# Patient Record
Sex: FEMALE | Race: WHITE | NOT HISPANIC OR LATINO | Employment: OTHER | URBAN - METROPOLITAN AREA
[De-identification: names, ages, dates, MRNs, and addresses within clinical notes are randomized per-mention and may not be internally consistent; named-entity substitution may affect disease eponyms.]

---

## 2017-01-19 PROBLEM — M47.816 LUMBAR SPONDYLOSIS: Status: ACTIVE | Noted: 2017-01-19

## 2017-01-19 PROBLEM — M51.36 DDD (DEGENERATIVE DISC DISEASE), LUMBAR: Status: ACTIVE | Noted: 2017-01-19

## 2017-09-15 LAB
ALBUMIN SERPL-MCNC: 3.5 G/DL (ref 3.1–4.7)
ALP SERPL-CCNC: 42 IU/L (ref 40–104)
ALT (SGPT): 17 IU/L (ref 3–33)
AST SERPL-CCNC: 21 IU/L (ref 10–40)
BILIRUB SERPL-MCNC: 0.4 MG/DL (ref 0.3–1)
BUN SERPL-MCNC: 7 MG/DL (ref 8–20)
CALCIUM SERPL-MCNC: 8.2 MG/DL (ref 7.7–10.4)
CHLORIDE: 113 MMOL/L (ref 98–110)
CO2 SERPL-SCNC: 24.4 MMOL/L (ref 22.8–31.6)
CREATININE: 0.59 MG/DL (ref 0.6–1.4)
GLUCOSE: 142 MG/DL (ref 70–99)
HCT VFR BLD AUTO: 31 % (ref 36–48)
HGB BLD-MCNC: 10.7 G/DL (ref 12–15)
MAGNESIUM SERPL-MCNC: 1.8 MG/DL (ref 1.5–2.6)
MCH RBC QN AUTO: 32.1 PG (ref 25–35)
MCHC RBC AUTO-ENTMCNC: 34.5 G/DL (ref 31–36)
MCV RBC AUTO: 93.1 FL (ref 79–98)
NUCLEATED RBCS: 0 %
PLATELET # BLD AUTO: 175 K/UL (ref 140–440)
POTASSIUM SERPL-SCNC: 2.9 MMOL/L (ref 3.5–5)
PROT SERPL-MCNC: 5.7 G/DL (ref 6–8.2)
RBC # BLD AUTO: 3.33 M/UL (ref 3.5–5.5)
SODIUM: 143 MMOL/L (ref 134–144)
WBC # BLD AUTO: 9 K/UL (ref 5–10)

## 2017-12-14 VITALS
WEIGHT: 135 LBS | SYSTOLIC BLOOD PRESSURE: 100 MMHG | BODY MASS INDEX: 21.69 KG/M2 | HEIGHT: 66 IN | DIASTOLIC BLOOD PRESSURE: 70 MMHG

## 2017-12-14 RX ORDER — MIRTAZAPINE 45 MG/1
45 TABLET, FILM COATED ORAL NIGHTLY
Status: ON HOLD | COMMUNITY
Start: 2017-12-08 | End: 2021-02-22 | Stop reason: HOSPADM

## 2017-12-14 RX ORDER — HYDROCODONE BITARTRATE AND ACETAMINOPHEN 10; 325 MG/1; MG/1
TABLET ORAL
COMMUNITY
Start: 2017-11-16 | End: 2018-06-06

## 2017-12-14 RX ORDER — ACETAMINOPHEN AND CODEINE PHOSPHATE 300; 30 MG/1; MG/1
TABLET ORAL
COMMUNITY
Start: 2017-10-18 | End: 2017-12-14 | Stop reason: SDUPTHER

## 2017-12-14 RX ORDER — VILAZODONE HYDROCHLORIDE 10 MG-20MG
KIT ORAL
COMMUNITY
Start: 2017-10-26 | End: 2018-06-06

## 2017-12-14 RX ORDER — CHLORPROMAZINE HYDROCHLORIDE 200 MG/1
TABLET, FILM COATED ORAL
COMMUNITY
Start: 2017-12-07 | End: 2018-10-01

## 2017-12-14 RX ORDER — ERGOCALCIFEROL 1.25 MG/1
CAPSULE ORAL
Refills: 0 | COMMUNITY
Start: 2017-10-25 | End: 2018-06-06

## 2017-12-14 RX ORDER — APIXABAN 5 MG/1
TABLET, FILM COATED ORAL
COMMUNITY
Start: 2017-12-12 | End: 2018-10-01

## 2017-12-14 RX ORDER — HYDROXYZINE PAMOATE 50 MG/1
CAPSULE ORAL
COMMUNITY
Start: 2017-12-08 | End: 2017-12-14 | Stop reason: SDUPTHER

## 2017-12-14 RX ORDER — TRAMADOL HYDROCHLORIDE 50 MG/1
TABLET ORAL
COMMUNITY
Start: 2017-12-08 | End: 2018-06-06

## 2017-12-14 RX ORDER — LORATADINE 10 MG/1
10 TABLET ORAL DAILY
Refills: 0 | COMMUNITY
Start: 2017-09-25 | End: 2018-06-06

## 2018-01-15 ENCOUNTER — OFFICE VISIT (OUTPATIENT)
Dept: SURGERY | Facility: CLINIC | Age: 43
End: 2018-01-15
Payer: MEDICARE

## 2018-01-15 VITALS
WEIGHT: 134.94 LBS | BODY MASS INDEX: 21.69 KG/M2 | DIASTOLIC BLOOD PRESSURE: 70 MMHG | HEIGHT: 66 IN | SYSTOLIC BLOOD PRESSURE: 100 MMHG

## 2018-01-15 DIAGNOSIS — R11.0 NAUSEA: ICD-10-CM

## 2018-01-15 DIAGNOSIS — N94.10 DYSPAREUNIA, FEMALE: ICD-10-CM

## 2018-01-15 DIAGNOSIS — R10.31 RIGHT LOWER QUADRANT ABDOMINAL PAIN: ICD-10-CM

## 2018-01-15 DIAGNOSIS — E27.8 RIGHT ADRENAL MASS: Primary | ICD-10-CM

## 2018-01-15 PROCEDURE — 99204 OFFICE O/P NEW MOD 45 MIN: CPT | Mod: ,,, | Performed by: SURGERY

## 2018-01-15 RX ORDER — LURASIDONE HYDROCHLORIDE 60 MG/1
80 TABLET, FILM COATED ORAL NIGHTLY
COMMUNITY
Start: 2018-01-04 | End: 2018-11-15

## 2018-01-15 NOTE — LETTER
January 15, 2018      Alberto Fairchild MD  [address]  [address]  [facility] [address]           [facility]  [address]  [address]  [facility] [address]  Phone: [phone]  Fax: [phone]          Patient: Rosalina Meyer   MR Number: [id number]   YOB: 1975   Date of Visit: 1/15/2018       Dear Dr. Alberto Fairchild:    Thank you for referring Rosalina Meyer to me for evaluation. Attached you will find relevant portions of my assessment and plan of care.    If you have questions, please do not hesitate to call me. I look forward to following Rosalina Meyer along with you.    Sincerely,    Preston ROCHE MD    Enclosure  CC:  No Recipients    If you would like to receive this communication electronically, please contact [email or web address] or [phone] to request more information on [employer] access.    For providers and/or their staff who would like to refer a patient to [facility], please contact us through our one-stop-shop provider referral line, [facility], at [phone].    If you feel you have received this communication in error or would no longer like to receive these types of communications, please e-mail [email or web address]

## 2018-01-15 NOTE — PROGRESS NOTES
Subjective:       Patient ID: Rosalina Meyer is a 42 y.o. female.    Chief Complaint: Other (Referred by Dr Mata to Evaluate Abdominal Adhesions)      HPI:  Patient presents with multiple complaints. Most of her complaints center around abdominal pain. The pain is in her right lower quadrant extending into her pelvis and right lower back. Patient also complains of intermittent nausea. Patient also complains of dyspareunia. Patient was in the emergency room in 2017 at which time a CT scan was done. 2.2 cm right adrenal mass was diagnosed. Patient has some hormonal studies done which showed mild elevation of dopamine but no other abnormalities. Patient has an appointment with an endocrinologist in next week or 2. Patient feels that most of her problems are secondary to abdominal adhesions.    Past Medical History:   Diagnosis Date    ADHD (attention deficit hyperactivity disorder)     Anemia     Anxiety     Bipolar 1 disorder     Bipolar disorder     CVA (cerebral vascular accident)     Endometriosis     Headache     Heartburn     Infertility, female     Insomnia     Migraine headache     PTSD (post-traumatic stress disorder)     Trauma      Past Surgical History:   Procedure Laterality Date    APPENDECTOMY      BLADDER SUSPENSION      CERVICAL FUSION       SECTION      CHOLECYSTECTOMY      HYSTERECTOMY      KNEE SURGERY      TIBIA FRACTURE SURGERY       Review of patient's allergies indicates:   Allergen Reactions    Seroquel [quetiapine] Other (See Comments)     Dystonic reaction    Neurontin [gabapentin]     Phenergan [promethazine]      Medication List with Changes/Refills   Current Medications    CARBAMAZEPINE (TEGRETOL) 100 MG CHEWABLE TABLET    Take by mouth 3 (three) times daily.    CHLORPROMAZINE (THORAZINE) 200 MG TABLET        CYCLOBENZAPRINE (FLEXERIL) 10 MG TABLET    Take 10 mg by mouth 3 (three) times daily as needed for Muscle spasms.     DEXTROAMPHETAMINE-AMPHETAMINE (ADDERALL) 15 MG TABLET    Take 15 mg by mouth 2 (two) times daily.    DIAZEPAM (VALIUM) 10 MG TAB        ELIQUIS 5 MG TAB        EPINEPHRINE (EPIPEN) 0.3 MG/0.3 ML ATIN    USE AS DIRECTED ONCE FOR ALLERGIC REACTION INJECTION FOR 30 DAYS    ESTRADIOL (ESTRACE) 0.01 % (0.1 MG/GRAM) VAGINAL CREAM    Place a pea-sized amount inside vagina every night for 2 weeks, and then 2-3 times a week.    HYDROCODONE-ACETAMINOPHEN 10-325MG (NORCO)  MG TAB        LAMOTRIGINE (LAMICTAL) 150 MG TAB        LATUDA 60 MG TAB TABLET        LORATADINE (CLARITIN) 10 MG TABLET    Take 10 mg by mouth once daily.    MELOXICAM (MOBIC) 15 MG TABLET    take 1 tablet by mouth once daily    MIRTAZAPINE (REMERON) 45 MG TABLET        NAPROXEN (NAPROSYN) 500 MG TABLET    take 1 tablet by mouth every 12 hours if needed for 30 DAYS    OMEPRAZOLE (PRILOSEC) 40 MG CAPSULE    Take 40 mg by mouth once daily.    PROCHLORPERAZINE (COMPAZINE) 10 MG TABLET    Take 10 mg by mouth 3 (three) times daily as needed.    RIZATRIPTAN (MAXALT-MLT) 10 MG DISINTEGRATING TABLET    Take 10 mg by mouth as needed. May repeat in 2 hours if needed     TRAMADOL (ULTRAM) 50 MG TABLET        VIIBRYD 10 MG (7)- 20 MG (23) DSPK        VITAMIN D2 50,000 UNIT CAPSULE    take 1 capsule by mouth every week for 90 DAYS     Family History   Problem Relation Age of Onset    Hypertension Father     Hypertension Mother     Stroke Mother      Social History     Social History    Marital status:      Spouse name: N/A    Number of children: N/A    Years of education: N/A     Social History Main Topics    Smoking status: Former Smoker     Packs/day: 1.00     Types: Cigarettes, Vaping w/o nicotine     Quit date: 12/27/2016    Smokeless tobacco: Former User    Alcohol use Yes    Drug use: Yes     Types: Marijuana    Sexual activity: Yes     Partners: Male     Birth control/ protection: See Surgical Hx     Other Topics Concern    None      Social History Narrative    None         Review of Systems   Constitutional: Negative for appetite change, chills, fever and unexpected weight change.   HENT: Negative for hearing loss, rhinorrhea, sore throat and voice change.    Eyes: Negative for photophobia and visual disturbance.   Respiratory: Negative for cough, choking and shortness of breath.    Cardiovascular: Negative for chest pain, palpitations and leg swelling.   Gastrointestinal: Positive for abdominal pain, diarrhea and nausea. Negative for blood in stool, constipation and vomiting.   Endocrine: Negative for cold intolerance, heat intolerance and polyphagia.   Genitourinary: Negative for dysuria.   Musculoskeletal: Negative for arthralgias and back pain.   Skin: Negative for color change.   Neurological: Negative for dizziness, seizures, syncope and headaches.   Hematological: Negative for adenopathy. Does not bruise/bleed easily.       Objective:      Physical Exam   Abdominal:   Abdomen is soft with no objective findings. There is diffuse subjective tenderness.       Assessment/Plan:   Right adrenal mass    Right lower quadrant abdominal pain    Nausea    Dyspareunia, female        Although patient has multiple complaints and conditions there are no surgical indications at this time.    #1 the adrenal mass due to its small size and lack of endocrine activity does not need surgical intervention. I agree with endocrine evaluation. Will defer recommendations for radiologic follow-up to the endocrinologist.    #2 evaluation of chronic abdominal pain and other above diagnoses are unfortunate outside the scope of my practice. I've advised the patient to return to her primary physician for referral to perhaps gastroenterology and or gynecology.    If any surgically correctable pathology is identified obese happy to see the patient again. Unfortunately I cannot help her in any other way at this time.

## 2018-01-31 ENCOUNTER — HOSPITAL ENCOUNTER (EMERGENCY)
Facility: HOSPITAL | Age: 43
Discharge: HOME | End: 2018-01-31
Attending: EMERGENCY MEDICINE
Payer: MEDICARE

## 2018-01-31 VITALS
BODY MASS INDEX: 21.69 KG/M2 | WEIGHT: 135 LBS | OXYGEN SATURATION: 99 % | DIASTOLIC BLOOD PRESSURE: 58 MMHG | HEART RATE: 99 BPM | RESPIRATION RATE: 18 BRPM | SYSTOLIC BLOOD PRESSURE: 103 MMHG | TEMPERATURE: 98 F | HEIGHT: 66 IN

## 2018-01-31 DIAGNOSIS — S93.515A SPRAIN OF INTERPHALANGEAL JOINT OF LEFT LESSER TOE(S), INITIAL ENCOUNTER: Primary | ICD-10-CM

## 2018-01-31 DIAGNOSIS — S90.212A CONTUSION OF LEFT GREAT TOE WITH DAMAGE TO NAIL, INITIAL ENCOUNTER: ICD-10-CM

## 2018-01-31 DIAGNOSIS — T14.90XA INJURY: ICD-10-CM

## 2018-01-31 PROCEDURE — 99283 EMERGENCY DEPT VISIT LOW MDM: CPT | Mod: 25

## 2018-01-31 PROCEDURE — 63600175 PHARM REV CODE 636 W HCPCS: Performed by: PHYSICIAN ASSISTANT

## 2018-01-31 PROCEDURE — 25000003 PHARM REV CODE 250: Performed by: PHYSICIAN ASSISTANT

## 2018-01-31 PROCEDURE — 96372 THER/PROPH/DIAG INJ SC/IM: CPT

## 2018-01-31 RX ORDER — KETOROLAC TROMETHAMINE 30 MG/ML
30 INJECTION, SOLUTION INTRAMUSCULAR; INTRAVENOUS
Status: COMPLETED | OUTPATIENT
Start: 2018-01-31 | End: 2018-01-31

## 2018-01-31 RX ORDER — MUPIROCIN 20 MG/G
1 OINTMENT TOPICAL
Status: COMPLETED | OUTPATIENT
Start: 2018-01-31 | End: 2018-01-31

## 2018-01-31 RX ADMIN — KETOROLAC TROMETHAMINE 30 MG: 30 INJECTION, SOLUTION INTRAMUSCULAR at 12:01

## 2018-01-31 RX ADMIN — MUPIROCIN 22 G: 20 OINTMENT TOPICAL at 12:01

## 2018-01-31 NOTE — ED PROVIDER NOTES
Encounter Date: 2018    SCRIBE #1 NOTE: I, Kalani Aguilera, am scribing for, and in the presence of,  DAVID Bell. I have scribed the entire note.       History     Chief Complaint   Patient presents with    Foot Pain     pt reports hit lt foot against wall yesterday co pain     2018 12:21 AM     Chief complaint: Left foot and second toe pain      Rosalina Meyer is a 42 y.o. female with a history of anxiety who presents to the ED with an onset of worsening left foot and second toe pain since yesterday after she hit her foot against the wall. She states that her second toe hurts the most, and the pain shoots up her foot. The patient has taken Ibuprofen with mild relief. There are no exacerbating factors for her symptoms. Her PSHx includes a knee surgery and tibia fracture surgery.       The history is provided by the patient.     Review of patient's allergies indicates:   Allergen Reactions    Seroquel [quetiapine] Other (See Comments)     Dystonic reaction    Haldol [haloperidol lactate]     Neurontin [gabapentin]     Phenergan [promethazine]      Past Medical History:   Diagnosis Date    ADHD (attention deficit hyperactivity disorder)     Anemia     Anxiety     Bipolar 1 disorder     Bipolar disorder     CVA (cerebral vascular accident)     Endometriosis     Headache     Heartburn     Infertility, female     Insomnia     Migraine headache     PTSD (post-traumatic stress disorder)     Trauma      Past Surgical History:   Procedure Laterality Date    APPENDECTOMY      BLADDER SUSPENSION      CERVICAL FUSION       SECTION      CHOLECYSTECTOMY      HYSTERECTOMY      KNEE SURGERY      TIBIA FRACTURE SURGERY       Family History   Problem Relation Age of Onset    Hypertension Father     Hypertension Mother     Stroke Mother      Social History   Substance Use Topics    Smoking status: Former Smoker     Packs/day: 1.00     Types: Cigarettes, Vaping w/o  nicotine     Quit date: 12/27/2016    Smokeless tobacco: Former User    Alcohol use Yes     Review of Systems   Constitutional: Negative for chills and fever.   HENT: Negative for facial swelling and trouble swallowing.    Eyes: Negative for discharge.   Respiratory: Negative for cough, chest tightness, shortness of breath and wheezing.    Cardiovascular: Negative for chest pain and palpitations.   Gastrointestinal: Negative for abdominal pain, diarrhea, nausea and vomiting.   Genitourinary: Negative for dysuria and hematuria.   Musculoskeletal: Negative for back pain, joint swelling, myalgias, neck pain and neck stiffness.        Positive for left foot and second toe pain.   Skin: Negative for color change, pallor, rash and wound.   Neurological: Negative for dizziness, syncope, weakness, light-headedness, numbness and headaches.   Hematological: Does not bruise/bleed easily.   Psychiatric/Behavioral: The patient is not nervous/anxious.        Physical Exam     Initial Vitals [01/31/18 1003]   BP Pulse Resp Temp SpO2   (!) 103/58 99 18 97.6 °F (36.4 °C) 99 %      MAP       73         Physical Exam    Nursing note and vitals reviewed.  Constitutional: She appears well-developed and well-nourished. She is not diaphoretic. No distress.   HENT:   Head: Normocephalic and atraumatic.   Cardiovascular: Intact distal pulses.   Musculoskeletal: Normal range of motion. She exhibits tenderness. She exhibits no edema.        Left foot: There is tenderness, bony tenderness and swelling. There is normal range of motion, normal capillary refill, no crepitus, no deformity and no laceration. Left 2nd toe: Exhibits tenderness (Small amount of dried blood and TTP with partial nail avulsion noted to left second toe.).        Feet:    Neurological: She is alert and oriented to person, place, and time. She has normal strength. No sensory deficit.   Skin: Skin is warm and dry. No rash and no abscess noted. No erythema.   Psychiatric:  She has a normal mood and affect.         ED Course   Procedures  Labs Reviewed - No data to display     Imaging Results          X-Ray Foot Complete Left (Final result)  Result time 01/31/18 11:28:36    Final result by Tonya Quinones MD (01/31/18 11:28:36)                 Impression:        No acute fracture or dislocation left foot      Electronically signed by: TONYA QUINONES MD  Date:     01/31/18  Time:    11:28              Narrative:    Left foot 3 views  Comparison study: 6/7/2016    FINDINGS    The K wire has been removed from the little toe.  There is no acute fracture or dislocation.  There is calcaneal spur at the site of insertion of the Achilles tendon.  There is mild degenerative change first metatarsophalangeal joint.                                 Medical Decision Making:   History:   Old Medical Records: I decided to obtain old medical records.  Differential Diagnosis:   Fracture  Dislocation  Sprain  Contusion  Strain  Spasm      Clinical Tests:   Radiological Study: Reviewed and Ordered       APC / Resident Notes:   X-rays of the left foot show no acute abnormalities, fractures or dislocations.  She does have a partial nail avulsion of left second toenail, but no nailbed laceration in need of repair at this time.  Nail is intact.  Toes are hermila taped and she is given a postop shoe with crutches.  No need for narcotics at this time.  She is given a dose of IM Toradol here in the emergency department and is discharged home to follow-up with podiatry for reevaluation and further treatment options.  She voices understanding and is agreeable to the plan.  She is given specific return precautions.       Scribe Attestation:   Scribe #1: I performed the above scribed service and the documentation accurately describes the services I performed. I attest to the accuracy of the note.    I, Xiao Melendez PA-C, personally performed the services described in this documentation. All medical record  entries made by the scribe were at my direction and in my presence.  I have reviewed the chart and agree that the record reflects my personal performance and is accurate and complete. Xiao Melendez PA-C.  6:24 PM 01/31/2018          ED Course      Clinical Impression:     1. Sprain of interphalangeal joint of left lesser toe(s), initial encounter    2. Injury    3. Contusion of left great toe with damage to nail, initial encounter          Disposition:   Disposition: Discharged  Condition: Stable                        Xiao Melendez PA-C  01/31/18 1826

## 2018-01-31 NOTE — ED NOTES
Given crutches with instruction on home use, hermila tape 2nd and third toe, post op shoe applied detailed teaching done given ice pack to use at home. Given written and verbal DC instructions questions answered per MD aware to follow up with PCP encouraged to return if needed.

## 2018-02-21 ENCOUNTER — TELEPHONE (OUTPATIENT)
Dept: UROLOGY | Facility: CLINIC | Age: 43
End: 2018-02-21

## 2018-02-21 ENCOUNTER — HOSPITAL ENCOUNTER (EMERGENCY)
Facility: HOSPITAL | Age: 43
Discharge: LEFT BEFORE DISCHARGE | End: 2018-02-21
Attending: EMERGENCY MEDICINE
Payer: MEDICARE

## 2018-02-21 VITALS
HEART RATE: 104 BPM | TEMPERATURE: 99 F | HEIGHT: 66 IN | WEIGHT: 135 LBS | BODY MASS INDEX: 21.69 KG/M2 | RESPIRATION RATE: 16 BRPM | DIASTOLIC BLOOD PRESSURE: 80 MMHG | OXYGEN SATURATION: 96 % | SYSTOLIC BLOOD PRESSURE: 107 MMHG

## 2018-02-21 DIAGNOSIS — R10.9 FLANK PAIN: Primary | ICD-10-CM

## 2018-02-21 LAB
ALBUMIN SERPL BCP-MCNC: 3.7 G/DL
ALP SERPL-CCNC: 69 U/L
ALT SERPL W/O P-5'-P-CCNC: 28 U/L
ANION GAP SERPL CALC-SCNC: 9 MMOL/L
AST SERPL-CCNC: 39 U/L
BASOPHILS # BLD AUTO: 0 K/UL
BASOPHILS NFR BLD: 0.4 %
BILIRUB SERPL-MCNC: 0.3 MG/DL
BILIRUB UR QL STRIP: NEGATIVE
BUN SERPL-MCNC: 11 MG/DL
CALCIUM SERPL-MCNC: 9 MG/DL
CHLORIDE SERPL-SCNC: 106 MMOL/L
CLARITY UR: CLEAR
CO2 SERPL-SCNC: 26 MMOL/L
COLOR UR: YELLOW
CREAT SERPL-MCNC: 0.7 MG/DL
DIFFERENTIAL METHOD: ABNORMAL
EOSINOPHIL # BLD AUTO: 0.5 K/UL
EOSINOPHIL NFR BLD: 6.7 %
ERYTHROCYTE [DISTWIDTH] IN BLOOD BY AUTOMATED COUNT: 13.1 %
EST. GFR  (AFRICAN AMERICAN): >60 ML/MIN/1.73 M^2
EST. GFR  (NON AFRICAN AMERICAN): >60 ML/MIN/1.73 M^2
GLUCOSE SERPL-MCNC: 94 MG/DL
GLUCOSE UR QL STRIP: NEGATIVE
HCT VFR BLD AUTO: 37.1 %
HGB BLD-MCNC: 12.5 G/DL
HGB UR QL STRIP: NEGATIVE
KETONES UR QL STRIP: NEGATIVE
LEUKOCYTE ESTERASE UR QL STRIP: NEGATIVE
LIPASE SERPL-CCNC: 22 U/L
LYMPHOCYTES # BLD AUTO: 2.9 K/UL
LYMPHOCYTES NFR BLD: 42.2 %
MCH RBC QN AUTO: 30 PG
MCHC RBC AUTO-ENTMCNC: 33.7 G/DL
MCV RBC AUTO: 89 FL
MONOCYTES # BLD AUTO: 0.5 K/UL
MONOCYTES NFR BLD: 6.9 %
NEUTROPHILS # BLD AUTO: 3 K/UL
NEUTROPHILS NFR BLD: 43.8 %
NITRITE UR QL STRIP: NEGATIVE
PH UR STRIP: 6 [PH] (ref 5–8)
PLATELET # BLD AUTO: 298 K/UL
PMV BLD AUTO: 7.2 FL
POTASSIUM SERPL-SCNC: 3.7 MMOL/L
PROT SERPL-MCNC: 6.4 G/DL
PROT UR QL STRIP: NEGATIVE
RBC # BLD AUTO: 4.16 M/UL
SODIUM SERPL-SCNC: 141 MMOL/L
SP GR UR STRIP: <=1.005 (ref 1–1.03)
URN SPEC COLLECT METH UR: ABNORMAL
UROBILINOGEN UR STRIP-ACNC: NEGATIVE EU/DL
WBC # BLD AUTO: 6.9 K/UL

## 2018-02-21 PROCEDURE — 96375 TX/PRO/DX INJ NEW DRUG ADDON: CPT

## 2018-02-21 PROCEDURE — 85025 COMPLETE CBC W/AUTO DIFF WBC: CPT

## 2018-02-21 PROCEDURE — 80053 COMPREHEN METABOLIC PANEL: CPT

## 2018-02-21 PROCEDURE — 96361 HYDRATE IV INFUSION ADD-ON: CPT

## 2018-02-21 PROCEDURE — 99284 EMERGENCY DEPT VISIT MOD MDM: CPT | Mod: 25

## 2018-02-21 PROCEDURE — 36415 COLL VENOUS BLD VENIPUNCTURE: CPT

## 2018-02-21 PROCEDURE — 83690 ASSAY OF LIPASE: CPT

## 2018-02-21 PROCEDURE — 96374 THER/PROPH/DIAG INJ IV PUSH: CPT

## 2018-02-21 PROCEDURE — 63600175 PHARM REV CODE 636 W HCPCS: Performed by: NURSE PRACTITIONER

## 2018-02-21 PROCEDURE — 25000003 PHARM REV CODE 250: Performed by: NURSE PRACTITIONER

## 2018-02-21 PROCEDURE — 81003 URINALYSIS AUTO W/O SCOPE: CPT

## 2018-02-21 RX ORDER — ONDANSETRON 2 MG/ML
4 INJECTION INTRAMUSCULAR; INTRAVENOUS
Status: COMPLETED | OUTPATIENT
Start: 2018-02-21 | End: 2018-02-21

## 2018-02-21 RX ORDER — KETOROLAC TROMETHAMINE 30 MG/ML
15 INJECTION, SOLUTION INTRAMUSCULAR; INTRAVENOUS
Status: COMPLETED | OUTPATIENT
Start: 2018-02-21 | End: 2018-02-21

## 2018-02-21 RX ADMIN — KETOROLAC TROMETHAMINE 15 MG: 30 INJECTION, SOLUTION INTRAMUSCULAR at 12:02

## 2018-02-21 RX ADMIN — SODIUM CHLORIDE 1000 ML: 0.9 INJECTION, SOLUTION INTRAVENOUS at 11:02

## 2018-02-21 RX ADMIN — ONDANSETRON 4 MG: 2 INJECTION INTRAMUSCULAR; INTRAVENOUS at 12:02
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
## 2018-02-21 NOTE — LETTER
Patient: Rosalina Jacobo  YOB: 1975  Date: 2/21/2018 Time: 1:03 PM  Location: [facility]    Leaving the [facility] Against Medical Advice    Chart #:[id number]    This will certify that I, the undersigned,    ______________________________________________________________________    A patient in the above named medical center, having requested discharge and removal from the medical [facility] against the advice of my attending physician(s), hereby release [employer], its physicians, officers and employees, severally and individually, from any and all liability of any nature whatsoever for any injury or harm or complication of any kind that may result directly or indirectly, by reason of my terminating my stay as a patient at [facility] and my departure from [facility], and hereby waive any and all rights of action I may now have or later acquire as a result of my voluntary departure from [facility] and the termination of my stay as a patient therein.    This release is made with the full knowledge of the danger that may result from the action which I am taking.      Date:_______________________                         ___________________________                                                                                    Patient/Legal Representative    Witness:        ____________________________                          ___________________________  Nurse                                                                        Physician

## 2018-02-21 NOTE — ED PROVIDER NOTES
"Encounter Date: 2/21/2018    SCRIBE #1 NOTE: I, Kalani Aguilera, am scribing for, and in the presence of,  Isa Gamez NP. I have scribed the entire note.       History     Chief Complaint   Patient presents with    Flank Pain     R flank pain. Was unable to walk to doctor's office for appt. this am.     02/21/2018 12:16 PM     Chief complaint: Right flank pain      Rosalina Jacobo is a 42 y.o. 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 Patient states that she has been in pain "since September 2017," but Dr. Kaye Monroe recommended that she come to the ED today. Recently, the patient had a CT with contrast done which revealed nothing acute. She states that movement exacerbates her pain. Patient denies SOB, dysuria, hematuria, or diarrhea, but notes difficulty urinating. She is allergic to Seroquel, Haldol, Neurontin, and Phenergan. There are no alleviating factors for her symptoms. Her PSHx includes a hysterectomy, cholecystectomy, appendectomy, and bladder suspension.      The history is provided by the patient.     Review of patient's allergies indicates:   Allergen Reactions    Seroquel [quetiapine] Other (See Comments)     Dystonic reaction    Haldol [haloperidol lactate]     Neurontin [gabapentin]     Phenergan [promethazine]      Past Medical History:   Diagnosis Date    ADHD (attention deficit hyperactivity disorder)     Anemia     Anxiety     Bipolar 1 disorder     Bipolar disorder     CVA (cerebral vascular accident)     Endometriosis     Headache     Heartburn     Infertility, female     Insomnia     Migraine headache     PTSD " (post-traumatic stress disorder)     Trauma      Past Surgical History:   Procedure Laterality Date    APPENDECTOMY      BLADDER SUSPENSION      CERVICAL FUSION       SECTION      CHOLECYSTECTOMY      HYSTERECTOMY      KNEE SURGERY      TIBIA FRACTURE SURGERY       Family History   Problem Relation Age of Onset    Hypertension Father     Hypertension Mother     Stroke Mother      Social History   Substance Use Topics    Smoking status: Former Smoker     Packs/day: 1.00     Types: Cigarettes, Vaping w/o nicotine     Quit date: 2016    Smokeless tobacco: Former User    Alcohol use Yes     Review of Systems   Constitutional: Positive for appetite change (decreased). Negative for chills and fever.        Positive for decreased fluid intake.    HENT: Negative for congestion, rhinorrhea and sore throat.    Eyes: Negative for pain and redness.   Respiratory: Negative for cough and shortness of breath.    Cardiovascular: Negative for chest pain and palpitations.   Gastrointestinal: Positive for abdominal pain (right upper), nausea and vomiting. Negative for diarrhea.   Genitourinary: Positive for difficulty urinating and flank pain (right). Negative for dysuria, frequency, hematuria and urgency.   Musculoskeletal: Negative for gait problem, myalgias and neck pain.   Skin: Negative for rash.   Neurological: Negative for dizziness, light-headedness and headaches.       Physical Exam     Initial Vitals [18 1028]   BP Pulse Resp Temp SpO2   107/80 104 16 99 °F (37.2 °C) 96 %      MAP       89         Physical Exam    Nursing note and vitals reviewed.  Constitutional: She appears well-developed and well-nourished. She is not diaphoretic. No distress.   HENT:   Head: Normocephalic and atraumatic.   Eyes: Conjunctivae are normal.   Neck: Normal range of motion.   Cardiovascular: Normal rate, regular rhythm and normal heart sounds.   Pulmonary/Chest: Breath sounds normal. She has no wheezes. She  has no rhonchi. She has no rales.   Abdominal: Soft. Bowel sounds are normal. There is tenderness in the right upper quadrant. There is guarding and CVA tenderness.   RUQ tenderness with guarding and right flank tenderness to light palpation. Patient grabbing examiner's hand and refusing to allow me to fully palpate the abdomen.   Musculoskeletal: Normal range of motion.   Neurological: She is alert and oriented to person, place, and time.   Skin: Skin is warm and dry. Capillary refill takes less than 2 seconds.   Psychiatric: She has a normal mood and affect.         ED Course   Procedures  Labs Reviewed   CBC W/ AUTO DIFFERENTIAL - Abnormal; Notable for the following:        Result Value    MPV 7.2 (*)     All other components within normal limits   URINALYSIS - Abnormal; Notable for the following:     Specific Gravity, UA <=1.005 (*)     All other components within normal limits   COMPREHENSIVE METABOLIC PANEL   LIPASE             Medical Decision Making:   History:   Old Medical Records: I decided to obtain old medical records.  Clinical Tests:   Lab Tests: Reviewed and Ordered       APC / Resident Notes:   Rosalina Jacobo is a 42 year old female presenting to the ED with flank and RUQ abdominal pain. Patient has had this pain for several months but reports recent worsening over the past two weeks. She had a CT done one week ago; I reviewed the radiologist report and there were no acute findings. Due to patient's increasing pain, we recommended repeat imaging with CT and labs. Patient spoke with Dr. So and stated that she did not want to wait for labs and was refusing imaging. Dr. So spoke with the patient and she decided to leave [place].        Scribe Attestation:   Scribe #1: I performed the above scribed service and the documentation accurately describes the services I performed. I attest to the accuracy of the note.    I, ANUM Henry, personally performed the services described in this  documentation. All medical record entries made by the scribe were at my direction and in my presence.  I have reviewed the chart and agree that the record reflects my personal performance and is accurate and complete. ANUM Henry.  4:28 PM 02/21/2018             Clinical Impression:     1. Flank pain          Disposition:   Disposition: TYRON Gamez NP  02/21/18 0652

## 2018-06-06 ENCOUNTER — OFFICE VISIT (OUTPATIENT)
Dept: DERMATOLOGY | Facility: CLINIC | Age: 43
End: 2018-06-06
Payer: MEDICARE

## 2018-06-06 ENCOUNTER — HOSPITAL ENCOUNTER (OUTPATIENT)
Dept: RADIOLOGY | Facility: CLINIC | Age: 43
Discharge: HOME | End: 2018-06-06
Attending: INTERNAL MEDICINE
Payer: MEDICARE

## 2018-06-06 ENCOUNTER — TELEPHONE (OUTPATIENT)
Dept: PULMONOLOGY | Facility: CLINIC | Age: 43
End: 2018-06-06

## 2018-06-06 ENCOUNTER — OFFICE VISIT (OUTPATIENT)
Dept: PULMONOLOGY | Facility: CLINIC | Age: 43
End: 2018-06-06
Payer: MEDICARE

## 2018-06-06 VITALS
HEART RATE: 70 BPM | BODY MASS INDEX: 24.1 KG/M2 | DIASTOLIC BLOOD PRESSURE: 81 MMHG | WEIGHT: 149.94 LBS | SYSTOLIC BLOOD PRESSURE: 127 MMHG | OXYGEN SATURATION: 99 % | HEIGHT: 66 IN

## 2018-06-06 VITALS — BODY MASS INDEX: 23.95 KG/M2 | HEIGHT: 66 IN | WEIGHT: 149 LBS

## 2018-06-06 DIAGNOSIS — R19.8 PALPABLE TENDER LIVER: ICD-10-CM

## 2018-06-06 DIAGNOSIS — R07.81 PLEURITIC CHEST PAIN: ICD-10-CM

## 2018-06-06 DIAGNOSIS — R09.89 CHRONIC SINUS COMPLAINTS: ICD-10-CM

## 2018-06-06 DIAGNOSIS — R09.89 CHRONIC SINUS COMPLAINTS: Primary | ICD-10-CM

## 2018-06-06 DIAGNOSIS — J45.20 MILD INTERMITTENT ASTHMA WITHOUT COMPLICATION: ICD-10-CM

## 2018-06-06 DIAGNOSIS — F42.4 SELF-EXCORIATION DISORDER: Primary | ICD-10-CM

## 2018-06-06 PROCEDURE — 99203 OFFICE O/P NEW LOW 30 MIN: CPT | Mod: S$PBB,,, | Performed by: DERMATOLOGY

## 2018-06-06 PROCEDURE — 99214 OFFICE O/P EST MOD 30 MIN: CPT | Mod: PBBFAC,PO | Performed by: INTERNAL MEDICINE

## 2018-06-06 PROCEDURE — 99212 OFFICE O/P EST SF 10 MIN: CPT | Mod: PBBFAC,25,27,PO | Performed by: DERMATOLOGY

## 2018-06-06 PROCEDURE — 99999 PR PBB SHADOW E&M-EST. PATIENT-LVL IV: CPT | Mod: PBBFAC,,, | Performed by: INTERNAL MEDICINE

## 2018-06-06 PROCEDURE — 71046 X-RAY EXAM CHEST 2 VIEWS: CPT | Mod: TC,FY,PO

## 2018-06-06 PROCEDURE — 99215 OFFICE O/P EST HI 40 MIN: CPT | Mod: S$PBB,,, | Performed by: INTERNAL MEDICINE

## 2018-06-06 PROCEDURE — 99999 PR PBB SHADOW E&M-EST. PATIENT-LVL II: CPT | Mod: PBBFAC,,, | Performed by: DERMATOLOGY

## 2018-06-06 PROCEDURE — 71046 X-RAY EXAM CHEST 2 VIEWS: CPT | Mod: 26,,, | Performed by: RADIOLOGY

## 2018-06-06 RX ORDER — FLUTICASONE FUROATE AND VILANTEROL 100; 25 UG/1; UG/1
1 POWDER RESPIRATORY (INHALATION) DAILY
Qty: 1 EACH | Refills: 11 | Status: SHIPPED | OUTPATIENT
Start: 2018-06-06 | End: 2018-11-15

## 2018-06-06 RX ORDER — MONTELUKAST SODIUM 10 MG/1
10 TABLET ORAL NIGHTLY
Qty: 30 TABLET | Refills: 11 | Status: SHIPPED | OUTPATIENT
Start: 2018-06-06 | End: 2018-12-12 | Stop reason: SDUPTHER

## 2018-06-06 RX ORDER — HYDROCODONE BITARTRATE AND ACETAMINOPHEN 5; 325 MG/1; MG/1
1 TABLET ORAL EVERY 6 HOURS PRN
Qty: 20 TABLET | Refills: 0 | Status: SHIPPED | OUTPATIENT
Start: 2018-06-06 | End: 2018-10-01

## 2018-06-06 RX ORDER — HYDROXYZINE PAMOATE 100 MG/1
100 CAPSULE ORAL 2 TIMES DAILY
COMMUNITY
End: 2020-04-08

## 2018-06-06 NOTE — TELEPHONE ENCOUNTER
No problem.      ----- Message from Soheila Jeffery sent at 6/6/2018  8:53 AM CDT -----  Contact: patient   Patient calling to speak to the Nurse. She states that she is running possibly 5 minutes late. She went to the wrong building. Please advise.   Call back   Thanks!

## 2018-06-06 NOTE — PATIENT INSTRUCTIONS
"Lungs look good on ct feb, need to re check cxr    I do not see convincing evidence for need for blood thinner- pe was ??, 6 months therapy reasonable to take for pe and stop/observe.  Would not consider you high risk for pe (due to prior "clott") based on our review of films.    Liver is tender.  Liver functions were normal and ct abd was good in feb 2018 when pain was most intense.  Suggest see Dr Peacock?    Adrenal abn was not seen on ct abd feb 2018 - just right kidney scar- films not compared to sept by radiologist.    Check lung capacity may be reasonable.  Likely mild intermittent asthma- singulair should help sinus and lungs.  Try breo once daily but doubt will help.    May follow up ct chest/abd but seems low yield.  See Dr Peacock and follow up 1 month. Could do repeat ct prior to follow up if needed.          "

## 2018-06-06 NOTE — PROGRESS NOTES
"2018    Rosalina Jacobo  New Patient Consult    Chief Complaint   Patient presents with    atelectasis     right lung    Shortness of Breath       HPI: disabled with depression and bone problems since .  Non smoker and vapes.  Has ptsd.      Has bronchitis 2x/yr -all life, uses prn inhaler, exercise regular but since illness 2017 has had decker and right chest discomfort.    Pt presented with resp failure attributed to drug od- had extensive infiltrate right lung and was on vent/got bronchoscopy??.    No h/o asthma, bronchitis seasonally fall and spring, chr stuffy and watery eyes,no fh asthma. Has cough and wheezes and noct worse with good inhaler in past.  Steroids ppt meanness.    No pft.    Had neck surg 2014 with voice change/swallow prob.      Lateral right lower chest deep breathing but no change gi.  Daily , worse exercise, inhaler no help.    The chief compliant  problem is new to me",   PFSH:  Past Medical History:   Diagnosis Date    ADHD (attention deficit hyperactivity disorder)     Anemia     Anxiety     Bipolar 1 disorder     Bipolar disorder     CVA (cerebral vascular accident)     Endometriosis     Headache     Heartburn     Infertility, female     Insomnia     Migraine headache     PTSD (post-traumatic stress disorder)     Trauma          Past Surgical History:   Procedure Laterality Date    APPENDECTOMY      BLADDER SUSPENSION      CERVICAL FUSION       SECTION      CHOLECYSTECTOMY      HYSTERECTOMY      KNEE SURGERY      TIBIA FRACTURE SURGERY       Social History   Substance Use Topics    Smoking status: Current Every Day Smoker     Packs/day: 1.00     Types: Cigarettes, Vaping with nicotine    Smokeless tobacco: Never Used      Comment: vaping everyday    Alcohol use Yes     Family History   Problem Relation Age of Onset    Hypertension Father     Hypertension Mother     Stroke Mother      Review of patient's allergies indicates: " "  Allergen Reactions    Seroquel [quetiapine] Other (See Comments)     Dystonic reaction    Haldol [haloperidol lactate]     Neurontin [gabapentin]        Performance Status:The patient's activity level is regular exercise.      Review of Systems:  a review of eleven systems covering constitutional, Eye, HEENT, Psych, Respiratory, Cardiac, GI, , Musculoskeletal, Endocrine, Dermatologic was negative except for pertinent findings as listed ABOVE and below: . Migranes,  pertinent positive as above, rest is good       Exam:Comprehensive exam done. /81 (BP Location: Left arm, Patient Position: Sitting)   Pulse 70   Ht 5' 6" (1.676 m)   Wt 68 kg (149 lb 14.6 oz)   SpO2 99%   BMI 24.20 kg/m²   Exam included Vitals as listed, and patient's appearance and affect and alertness and mood, oral exam for yeast and hygiene and pharynx lesions and Mallapatti (M) score, neck with inspection for jvd and masses and thyroid abnormalities and lymph nodes (supraclavicular and infraclavicular nodes and axillary also examined and noted if abn), chest exam included symmetry and effort and fremitus and percussion and auscultation, cardiac exam included rhythm and gallops and murmur and rubs and jvd and edema, abdominal exam for mass and hepatosplenomegaly and tenderness and hernias and bowel sounds, Musculoskeletal exam with muscle tone and posture and mobility/gait and  strength, and skin for rashes and cyanosis and pallor and turgor, extremity for clubbing.  Findings were normal except for pertinent findings listed below:   M3, right upper quadrant sl tender - liver not enlarged    Radiographs (ct chest and cxr) reviewed: view by direct vision  Sept 2017 with extensive infiltrate right with et tube, diligent review was minially impressive (doubt significant pe or even any pe)- pe-   Pt had ct abd in feb 2018 with min scar peripheral right lung, adrenal abn seen sept 2017 looks like rigth kidney with scarring.  Labs " reviewed outside labs    Results for ROSALINA BRAND (MRN [id number]) as of 6/6/2018 10:03   Ref. Range 2/12/2008 15:25 6/1/2012 10:05 11/5/2012 16:55 9/15/2017 04:00 2/21/2018 12:40   Eosinophil% Latest Ref Range: 0.0 - 8.0 % 3.7    6.7   Eosinophils Relative Latest Ref Range: 0.0 - 3.0 %  1.8 2.0     Eos # Latest Ref Range: 0.0 - 0.5 K/uL 0.4    0.5   Basophil% Latest Ref Range: 0.0 - 1.9 % 0.2 0.4 1.1  0.4     PFT will be done and results to be reviewed       Plan:  Clinical impression is resonably certain and repeated evaluation prn +/- follow up will be needed as below.     Rosalina was seen today for atelectasis and shortness of breath.    Diagnoses and all orders for this visit:    Chronic sinus complaints  -     montelukast (SINGULAIR) 10 mg tablet; Take 1 tablet (10 mg total) by mouth every evening.  -     X-Ray Chest PA And Lateral; Future    Mild intermittent asthma without complication  -     montelukast (SINGULAIR) 10 mg tablet; Take 1 tablet (10 mg total) by mouth every evening.  -     fluticasone-vilanterol (BREO) 100-25 mcg/dose diskus inhaler; Inhale 1 puff into the lungs once daily.  -     X-Ray Chest PA And Lateral; Future    Pleuritic chest pain  -     X-Ray Chest PA And Lateral; Future  -     HYDROcodone-acetaminophen (NORCO) 5-325 mg per tablet; Take 1 tablet by mouth every 6 (six) hours as needed for Pain (cough).    Palpable tender liver  -     Comprehensive metabolic panel; Future  -     Protime-INR; Future  -     CBC auto differential; Future  -     X-Ray Chest PA And Lateral; Future  -     HYDROcodone-acetaminophen (NORCO) 5-325 mg per tablet; Take 1 tablet by mouth every 6 (six) hours as needed for Pain (cough).        Follow-up in about 2 weeks (around 6/20/2018), or if symptoms worsen or fail to improve.    Discussed with patient above for education the following:      Patient Instructions   Lungs look good on ct feb, need to re check cxr    I do not see convincing evidence for need  "for blood thinner- pe was ??, 6 months therapy reasonable to take for pe and stop/observe.  Would not consider you high risk for pe (due to prior "clott") based on our review of films.    Liver is tender.  Liver functions were normal and ct abd was good in feb 2018 when pain was most intense.  Suggest see Dr Peacock?    Adrenal abn was not seen on ct abd feb 2018 - just right kidney scar- films not compared to sept by radiologist.    Check lung capacity may be reasonable.  Likely mild intermittent asthma- singulair should help sinus and lungs.  Try breo once daily but doubt will help.    May follow up ct chest/abd but seems low yield.  See Dr Peacock and follow up 1 month. Could do repeat ct prior to follow up if needed.            "

## 2018-06-06 NOTE — PROGRESS NOTES
Subjective:       Patient ID:  Rosalina Jacobo is a 43 y.o. female who presents for   Chief Complaint   Patient presents with    Urticaria     New patient presents today for complaint of itching on forearms for 1.5 years, previously on legs. Resolved. Itching. Denies an actual rash or welts.  Develops only when in  extra stressful situations. Desires to pick and scratch at area when in these situations such as in difficult conversations with . Currently undergoing significant family stress related to her daughter. Followed by neurology, psychiatry. History anxiety, depression.  Applies neosporin to sites as picks and scratches. As a child she had a similar condition only on her legs. Again, this has resolved.  Seen by pulmonology today and started on singulair. .  Takes adderrall regularly for years. Feels this could not be contributing.         Urticaria         Review of Systems   Constitutional: Negative for fever, weight loss, weight gain and night sweats.   Skin: Positive for itching and daily sunscreen use. Negative for rash, dry skin and activity-related sunscreen use (avoids sun).   Psychiatric/Behavioral: Positive for anxiety and high stress.   Hematologic/Lymphatic: Does not bruise/bleed easily.        Objective:    Physical Exam   Constitutional: She appears well-developed and well-nourished. No distress.   HENT:   Mouth/Throat: Lips normal.    Eyes: Lids are normal.  No conjunctival no injection.   Cardiovascular: There is no local extremity swelling and no dependent edema.     Neurological: She is alert and oriented to person, place, and time. She is not disoriented.   Psychiatric: She has a normal mood and affect.   Skin:   Areas Examined (abnormalities noted in diagram):   Head / Face Inspection Performed  Neck Inspection Performed  Chest / Axilla Inspection Performed  RUE Inspected  LUE Inspection Performed  Nails and Digits Inspection Performed              Diagram Legend      Erythematous scaling macule/papule c/w actinic keratosis       Vascular papule c/w angioma      Pigmented verrucoid papule/plaque c/w seborrheic keratosis      Yellow umbilicated papule c/w sebaceous hyperplasia      Irregularly shaped tan macule c/w lentigo     1-2 mm smooth white papules consistent with Milia      Movable subcutaneous cyst with punctum c/w epidermal inclusion cyst      Subcutaneous movable cyst c/w pilar cyst      Firm pink to brown papule c/w dermatofibroma      Pedunculated fleshy papule(s) c/w skin tag(s)      Evenly pigmented macule c/w junctional nevus     Mildly variegated pigmented, slightly irregular-bordered macule c/w mildly atypical nevus      Flesh colored to evenly pigmented papule c/w intradermal nevus       Pink pearly papule/plaque c/w basal cell carcinoma      Erythematous hyperkeratotic cursted plaque c/w SCC      Surgical scar with no sign of skin cancer recurrence      Open and closed comedones      Inflammatory papules and pustules      Verrucoid papule consistent consistent with wart     Erythematous eczematous patches and plaques     Dystrophic onycholytic nail with subungual debris c/w onychomycosis     Umbilicated papule    Erythematous-base heme-crusted tan verrucoid plaque consistent with inflamed seborrheic keratosis     Erythematous Silvery Scaling Plaque c/w Psoriasis     See annotation      Assessment / Plan:        Self-excoriation disorder    denies illicit drug use; however, discussed with patient that stimulants such as adderall may exacerbate  Discussed at length with patient etiology of lesions. Lesions are caused and perpetuated by scratching and picking at skin and admittedly are secondary to stress/anxiety and not a primary dermatologic condition  Use ice on lesions for intense pruritus.  Recommend psychiatric intervention to evaluate for SSRI treatment. Pt on numerous psych meds. Allergic to gabapentin, previously failed cymbalta  Ok to try otc  sarna  Recommend apply Sarna lotion to skin as often as needed. May store in refrigerator for additional cooling properties.  Pt given information for Brandi Moe- psychotherapist for help with management of anxiety component  Dr. Jackeline Morley- psychiatrist          Follow-up if symptoms worsen or fail to improve.

## 2018-06-07 ENCOUNTER — TELEPHONE (OUTPATIENT)
Dept: PULMONOLOGY | Facility: CLINIC | Age: 43
End: 2018-06-07

## 2018-06-07 NOTE — TELEPHONE ENCOUNTER
lvm for pt to return my call     ----- Message from Richie Segal MD sent at 6/6/2018  4:03 PM CDT -----  Notify cxr was good, could do ct chest and abd - have pt call after seeing dr velasco to arrange.

## 2018-08-02 ENCOUNTER — TELEPHONE (OUTPATIENT)
Dept: PULMONOLOGY | Facility: CLINIC | Age: 43
End: 2018-08-02

## 2018-08-02 NOTE — TELEPHONE ENCOUNTER
Faxed the last office notes to [phone].    ----- Message from Denise Broderick sent at 8/2/2018  2:56 PM CDT -----  Contact: harleen  Type: Needs Medical Advice    Who Called:  Harleen with gastro group   Symptoms (please be specific):    How long has patient had these symptoms:    Pharmacy name and phone #:    Best Call Back Number: [phone]  Additional Information: requesting last office notes be fax to [phone]

## 2018-08-20 ENCOUNTER — TELEPHONE (OUTPATIENT)
Dept: UROLOGY | Facility: CLINIC | Age: 43
End: 2018-08-20

## 2018-08-20 NOTE — TELEPHONE ENCOUNTER
----- Message from Hazeljc Grimm sent at 8/20/2018 11:37 AM CDT -----  Patient states that she has an appointment scheduled but need to be seen before the 9-18 appointment stating that she is in pain.  Please call patient at [phone].

## 2018-08-30 ENCOUNTER — TELEPHONE (OUTPATIENT)
Dept: PULMONOLOGY | Facility: CLINIC | Age: 43
End: 2018-08-30

## 2018-08-30 NOTE — TELEPHONE ENCOUNTER
Advised the patient to have the new [facility] call her old one to transfer her Singular to them. Her Remeron is from another Doctor and will call them to refill that medication.      ----- Message from Symone Restrepo sent at 8/30/2018 10:44 AM CDT -----  Contact: pt  Pt is requesting a refill on her medication(montelukast (SINGULAIR) 10 mg tablet)  She would also like to know if the Dr would refill her(mirtazapine (REMERON) 45 MG tablet)  Please call pt to advise, pt would like to use the Pharmacy listed below  Call  Back   Thanks    [facility]  [address] [place] [address]  [phone]

## 2018-09-10 ENCOUNTER — TELEPHONE (OUTPATIENT)
Dept: UROLOGY | Facility: CLINIC | Age: 43
End: 2018-09-10

## 2018-09-10 NOTE — TELEPHONE ENCOUNTER
"----- Message from Luna Slater MD sent at 9/8/2018  9:17 PM CDT -----  Seeing me for "bladder fallen". Find out if she has prolapse- does she feel her vagina falling out? If so, needs to be seen by urogyn instead. However if her complaint is leaking urine then I will see her  "

## 2018-09-13 ENCOUNTER — APPOINTMENT (OUTPATIENT)
Dept: LAB | Facility: HOSPITAL | Age: 43
End: 2018-09-13
Attending: UROLOGY
Payer: MEDICARE

## 2018-09-13 ENCOUNTER — OFFICE VISIT (OUTPATIENT)
Dept: UROLOGY | Facility: CLINIC | Age: 43
End: 2018-09-13
Payer: MEDICARE

## 2018-09-13 VITALS
TEMPERATURE: 99 F | BODY MASS INDEX: 22.5 KG/M2 | RESPIRATION RATE: 18 BRPM | HEART RATE: 82 BPM | DIASTOLIC BLOOD PRESSURE: 70 MMHG | SYSTOLIC BLOOD PRESSURE: 106 MMHG | WEIGHT: 140 LBS | HEIGHT: 66 IN

## 2018-09-13 DIAGNOSIS — N94.19 DYSPAREUNIA DUE TO MEDICAL CONDITION IN FEMALE: Primary | ICD-10-CM

## 2018-09-13 DIAGNOSIS — N30.01 ACUTE CYSTITIS WITH HEMATURIA: ICD-10-CM

## 2018-09-13 DIAGNOSIS — R82.89 ABNORMAL FINDINGS ON CYTOLOGICAL AND HISTOLOGICAL EXAMINATION OF URINE: ICD-10-CM

## 2018-09-13 DIAGNOSIS — N89.8 VAGINAL DISCHARGE: ICD-10-CM

## 2018-09-13 DIAGNOSIS — N39.3 SUI (STRESS URINARY INCONTINENCE, FEMALE): ICD-10-CM

## 2018-09-13 DIAGNOSIS — R39.11 URINARY HESITANCY: ICD-10-CM

## 2018-09-13 DIAGNOSIS — R39.198 INCREASING RESIDUAL URINE: ICD-10-CM

## 2018-09-13 LAB
BACTERIA #/AREA URNS HPF: ABNORMAL /HPF
BILIRUB SERPL-MCNC: ABNORMAL MG/DL
BILIRUB UR QL STRIP: NEGATIVE
BLOOD URINE, POC: ABNORMAL
CLARITY UR: CLEAR
COLOR UR: YELLOW
COLOR, POC UA: ABNORMAL
GLUCOSE UR QL STRIP: ABNORMAL
GLUCOSE UR QL STRIP: NEGATIVE
HGB UR QL STRIP: NEGATIVE
KETONES UR QL STRIP: ABNORMAL
KETONES UR QL STRIP: NEGATIVE
LEUKOCYTE ESTERASE UR QL STRIP: NEGATIVE
LEUKOCYTE ESTERASE URINE, POC: ABNORMAL
MICROSCOPIC COMMENT: ABNORMAL
NITRITE UR QL STRIP: POSITIVE
NITRITE, POC UA: ABNORMAL
PH UR STRIP: 6 [PH] (ref 5–8)
PH, POC UA: 5
PROT UR QL STRIP: NEGATIVE
PROTEIN, POC: ABNORMAL
RBC #/AREA URNS HPF: 0 /HPF (ref 0–4)
SP GR UR STRIP: 1.01 (ref 1–1.03)
SPECIFIC GRAVITY, POC UA: 1.02
SQUAMOUS #/AREA URNS HPF: 4 /HPF
URN SPEC COLLECT METH UR: ABNORMAL
UROBILINOGEN UR STRIP-ACNC: NEGATIVE EU/DL
UROBILINOGEN, POC UA: ABNORMAL
WBC #/AREA URNS HPF: 4 /HPF (ref 0–5)

## 2018-09-13 PROCEDURE — 99999 PR PBB SHADOW E&M-EST. PATIENT-LVL V: CPT | Mod: PBBFAC,,, | Performed by: UROLOGY

## 2018-09-13 PROCEDURE — 87077 CULTURE AEROBIC IDENTIFY: CPT

## 2018-09-13 PROCEDURE — 87086 URINE CULTURE/COLONY COUNT: CPT

## 2018-09-13 PROCEDURE — 81000 URINALYSIS NONAUTO W/SCOPE: CPT

## 2018-09-13 PROCEDURE — 87088 URINE BACTERIA CULTURE: CPT

## 2018-09-13 PROCEDURE — 87660 TRICHOMONAS VAGIN DIR PROBE: CPT

## 2018-09-13 PROCEDURE — 99205 OFFICE O/P NEW HI 60 MIN: CPT | Mod: S$PBB,,, | Performed by: UROLOGY

## 2018-09-13 PROCEDURE — 99215 OFFICE O/P EST HI 40 MIN: CPT | Mod: PBBFAC,PN,25 | Performed by: UROLOGY

## 2018-09-13 PROCEDURE — 87186 SC STD MICRODIL/AGAR DIL: CPT

## 2018-09-13 PROCEDURE — 51701 INSERT BLADDER CATHETER: CPT | Mod: PBBFAC,PN | Performed by: UROLOGY

## 2018-09-13 PROCEDURE — 81002 URINALYSIS NONAUTO W/O SCOPE: CPT | Mod: PBBFAC,PN | Performed by: UROLOGY

## 2018-09-13 NOTE — PROGRESS NOTES
"[facility] - [place]  Staff: MD Nohemy    Referring provider and please cc: self  PCP: Dr.[facility]: will sign up today-     Chief Complaint: "bladder fallen"    Subjective:        HPI: Rosalina Jacobo is a 43 y.o. female presents with     Luisa, female s/p sling  with hesistancy  -underwent a mid-urethral sling with mesh about 7 years ago. Had improvement in her incontinence for 2 years and then had recurrent LUISA.  Tends to hold urine. Wear 3 pantyliners that are not wet by the time to changes them. Voids 4-8x a day. Denies UUI.   -  -c/o hesistancy and incomplete emptying.  -she has a h/o 3 strokes, last one in .     dyspareunia  -pain with intercourse that started a month ago. She says it "feels like he is hitting my bladder" but denies bladder prolapse symptoms.    -ua today voided + for infection. Denies vaginal discharge.   -no h/o STDS.     Ua void: 1.020/2+leuk/nit+/tr blood - asx  ua cath: sent for ua and culture, 140cc residual   Urine history/culture history   18 Void: neg  17 E.coli, void: neg    BV  12 Void: neg  12  Void: nit+/tr leuk/1+blood    ECOG Status: 0    REVIEW OF SYSTEMS:  General ROS: no fevers, no chills  Psychological ROS: no depression  Endocrine ROS: no heat or cold  Respiratory ROS: no SOB  Cardiovascular ROS: no CP  Gastrointestinal ROS: no abdominal pain, no constipation, no diarrhea, noBRBPR  Musculoskeletal ROS: no muscle pain  Neurological ROS: no headaches  Dermatological ROS: no rashes  HEENT: noglasses, no sinus   ROS: per HPI     PMHx:  Past Medical History:   Diagnosis Date    ADHD (attention deficit hyperactivity disorder)     Anemia     Anxiety     Bipolar 1 disorder     Bipolar disorder     CVA (cerebral vascular accident) x 3, last one      Endometriosis     Headache     Heartburn     Infertility, female     Insomnia     Migraine headache     PTSD (post-traumatic stress " disorder)     Trauma      Kidney stones: No    PSHx:  Past Surgical History:   Procedure Laterality Date    APPENDECTOMY      BLADDER SUSPENSION      CERVICAL FUSION       SECTION      CHOLECYSTECTOMY      HYSTERECTOMY      KNEE SURGERY      TIBIA FRACTURE SURGERY       Urologic or Gynecologic Surgery: mid urethral sling    Stents/Valves/Foreign Bodies: No  Cardiac Evaluation: No    Screening Studies  Colonoscopy: last one 6-7 years ago. +polyps.     [facility] Hx:   malignancies: No , gyn malignancies: No . Parents alive- mother 64, father 61  kidney stones: Yes - mother     Soc Hx:  + tobacco.  <1/2 pk per day x 30 year  No alcohol  Lives in [place]  :yes  Children: 1  Occupation:housewife    Allergies:  Seroquel [quetiapine]; Haldol [haloperidol lactate]; Neurontin [gabapentin]; and Thorazine [chlorpromazine]    Medications: reviewed   Anticoagulation: No    Objective:     Vitals:    18 1432   BP: 106/70   Pulse: 82   Resp: 18   Temp: 98.6 °F (37 °C)       General:WDWN in NAD  Eyes: PERRLA, normal conjunctiva  Respiratory: no increased work on breathing. No wheezing.   Cardiovascular: No obvious extremity edema. Warm and well perfused.   GI: no palpation of masses. No tenderness. No hepatosplenomegaly to palpation.  Musculoskeletal: normal range of motion of bilateral upper extremities. Normal muscle strength and tone.  Skin: no obvious rashes or lesions. No tightening of skin noted.  Neurologic: CN grossly normal. Normal sensation.   Psychiatric: awake, alert and oriented x 3. Mood and affect normal. Cooperative.    Pelvic exam  External Genitalia: normal hair distribution, no lesions  Urethral meatus: normal without prolapse or caruncle  Urethra: without tenderness or mass  Bladder: without fulness or tenderness  Vagina: normal appearing. No discharge or lesions. +anterior prolapse, minimal. +vaginal disharge, white-sent for vaginosis screen.   Anus and perineum: appear  normal  In and out cath performed with 140cc residual, just emptied  Levator ani tenderness: bilateral     LABS REVIEW:    Lab Results   Component Value Date    WBC 8.95 06/06/2018    HGB 13.7 06/06/2018    HCT 42.7 06/06/2018    MCV 91 06/06/2018     06/06/2018     BMP  Lab Results   Component Value Date     06/06/2018    K 4.3 06/06/2018     06/06/2018    CO2 27 06/06/2018    BUN 9 06/06/2018    CREATININE 0.9 06/06/2018    CALCIUM 9.9 06/06/2018    ANIONGAP 8 06/06/2018    ESTGFRAFRICA >60.0 06/06/2018    EGFRNONAA >60.0 06/06/2018         PATHOLOGY REVIEW:  Pap 10/20/16 BV    RADIOGRAPHIC REVIEW:  ctrss 9/8/15    There is scarring in the right kidney, most commonly seen with prior infection.  3-mm calcification noted in the lower pole of the right kidney.  Tiny ossifications are seen in the upper pole of the right kidney.  No hydroureteronephrosis or ureteral   stone is identified.      Assessment:       1. Dyspareunia due to medical condition in female    2. Urinary hesitancy    3. GREG (stress urinary incontinence, female)    4. Acute cystitis with hematuria    5. Increasing residual urine    6. Vaginal discharge    7. Abnormal findings on cytological and histological examination of urine           Plan:     Dyspareunia, vaginal discharge, uti  -I think this likely from a uti or +vaginosis infection. Will send catheterized urine for urinalysis and culture and treat. Will send vaginosis screen and treat  -explained that tenderness of bladder with palpation on vaginal exam is not an indicator of cystocele needing repair but more likely infection, especially since this is acute (started 1 month ago).    Incomplete emptying with 140cc residual  -could be from uti, or could be from obstruction, unlikely from cystocele causing obstruction. Prolapse/cystocele is very minimal. May need urodynamics in the future if this remains elevated despite treatment of uti.  -seeing urogyn in 1 month, they can  check a residual at the time of vaginal exam. If it remains elevated and they feel it is not due to any prolapse can refer back to me.     GREG (stress incontinence) recurrent  -not that bothersome, minimal pad usage. Would avoid re-treatment unless it becomes very bothersome. Encourage her to empty every 2 to 3 hours first.     F/u with  as scheduled, even if pain resolved, at minimum to check residual. F/u with me if residual remains elevated and not due to prolapse.    I spent 60 minutes with the patient of which more than half was spent in direct consultation with the patient in regards to our treatment and plan.      Luna Slater MD

## 2018-09-13 NOTE — PATIENT INSTRUCTIONS
Dyspareunia  -I think this likely from a uti or +vaginosis infection. Will send cath urine for urinalysis and culture and treat. Will send vaginosis screen and treat  -explained that tenderness of bladder with palpation on vaginal exam is not an indicator of cystocele needing repair but more likely infection, especially since this is acute (started 1 month ago).    Incomplete emptying with 140cc residual  -could be from uti, or could be from obstruction, unlikely from cystocele causing obstruction. Prolapse/cystocele is very minimal. May need urodynamics in the future if this remains elevated despite treatment of uti.  -seeing urogyn in 1 month, they can check a residual at the time of vaginal exam. If it remains elevated and they feel it is not due to any prolapse can refer back to me.     GREG (stress incontinence) recurrent  -not that bothersome, minimal pad usage. Would avoid re-treatment unless it becomes very bothersome. Encourage her to empty every 2 to 3 hours first.     F/u with  as scheduled, even if pain resolved, at minimum to check residual. F/u with me if residual remains elevated and not due to prolapse.

## 2018-09-13 NOTE — Clinical Note
Seeing you in a month. Needs repeat I&o and vag exam for cystocele... I told her cystocle not her issue, likley uti or vaginosis. But will have you recheck since she's seeing you. Plus I think she's convinced she has a cystocele that needs to be repaired.

## 2018-09-14 LAB
CANDIDA RRNA VAG QL PROBE: NEGATIVE
G VAGINALIS RRNA GENITAL QL PROBE: POSITIVE
T VAGINALIS RRNA GENITAL QL PROBE: NEGATIVE

## 2018-09-14 RX ORDER — METRONIDAZOLE 500 MG/1
500 TABLET ORAL EVERY 12 HOURS
Qty: 14 TABLET | Refills: 0 | Status: SHIPPED | OUTPATIENT
Start: 2018-09-14 | End: 2018-10-01

## 2018-09-17 LAB — BACTERIA UR CULT: NORMAL

## 2018-09-17 RX ORDER — AMOXICILLIN AND CLAVULANATE POTASSIUM 875; 125 MG/1; MG/1
1 TABLET, FILM COATED ORAL 2 TIMES DAILY
Qty: 10 TABLET | Refills: 0 | Status: SHIPPED | OUTPATIENT
Start: 2018-09-17 | End: 2018-09-22

## 2018-09-18 ENCOUNTER — PATIENT MESSAGE (OUTPATIENT)
Dept: UROGYNECOLOGY | Facility: CLINIC | Age: 43
End: 2018-09-18

## 2018-09-18 ENCOUNTER — PATIENT MESSAGE (OUTPATIENT)
Dept: UROLOGY | Facility: CLINIC | Age: 43
End: 2018-09-18

## 2018-09-28 ENCOUNTER — TELEPHONE (OUTPATIENT)
Dept: UROLOGY | Facility: CLINIC | Age: 43
End: 2018-09-28

## 2018-09-28 NOTE — TELEPHONE ENCOUNTER
----- Message from Gabrielle Otoole sent at 9/28/2018  3:23 PM CDT -----  Contact: self  Patient calling to inform doctor she was able to  the antibiotic. Please call patient at [phone]. Thanks!

## 2018-10-01 ENCOUNTER — OFFICE VISIT (OUTPATIENT)
Dept: UROGYNECOLOGY | Facility: CLINIC | Age: 43
End: 2018-10-01
Payer: MEDICARE

## 2018-10-01 VITALS
HEART RATE: 87 BPM | HEIGHT: 66 IN | SYSTOLIC BLOOD PRESSURE: 114 MMHG | BODY MASS INDEX: 24.55 KG/M2 | WEIGHT: 152.75 LBS | DIASTOLIC BLOOD PRESSURE: 81 MMHG

## 2018-10-01 DIAGNOSIS — R39.89 CYSTALGIA: ICD-10-CM

## 2018-10-01 DIAGNOSIS — R35.0 URINARY FREQUENCY: Primary | ICD-10-CM

## 2018-10-01 DIAGNOSIS — M62.89 HIGH-TONE PELVIC FLOOR DYSFUNCTION: ICD-10-CM

## 2018-10-01 DIAGNOSIS — R39.198 INCREASING RESIDUAL URINE: ICD-10-CM

## 2018-10-01 DIAGNOSIS — N94.19 DYSPAREUNIA DUE TO MEDICAL CONDITION IN FEMALE: ICD-10-CM

## 2018-10-01 LAB
BILIRUB SERPL-MCNC: ABNORMAL MG/DL
BLOOD URINE, POC: ABNORMAL
COLOR, POC UA: ABNORMAL
GLUCOSE UR QL STRIP: NEGATIVE
KETONES UR QL STRIP: NEGATIVE
LEUKOCYTE ESTERASE URINE, POC: NEGATIVE
NITRITE, POC UA: NEGATIVE
PH, POC UA: 5
PROTEIN, POC: NEGATIVE
SPECIFIC GRAVITY, POC UA: 1.02
UROBILINOGEN, POC UA: NORMAL

## 2018-10-01 PROCEDURE — 99999 PR PBB SHADOW E&M-EST. PATIENT-LVL III: CPT | Mod: PBBFAC,,, | Performed by: OBSTETRICS & GYNECOLOGY

## 2018-10-01 PROCEDURE — 99213 OFFICE O/P EST LOW 20 MIN: CPT | Mod: PBBFAC,PO | Performed by: OBSTETRICS & GYNECOLOGY

## 2018-10-01 PROCEDURE — 87086 URINE CULTURE/COLONY COUNT: CPT

## 2018-10-01 PROCEDURE — 99214 OFFICE O/P EST MOD 30 MIN: CPT | Mod: 25,S$PBB,, | Performed by: OBSTETRICS & GYNECOLOGY

## 2018-10-01 PROCEDURE — 51798 US URINE CAPACITY MEASURE: CPT | Mod: PBBFAC,PO | Performed by: OBSTETRICS & GYNECOLOGY

## 2018-10-01 PROCEDURE — 81002 URINALYSIS NONAUTO W/O SCOPE: CPT | Mod: PBBFAC,PO | Performed by: OBSTETRICS & GYNECOLOGY

## 2018-10-01 RX ORDER — TAMSULOSIN HYDROCHLORIDE 0.4 MG/1
0.4 CAPSULE ORAL DAILY
Qty: 30 CAPSULE | Refills: 11 | Status: SHIPPED | OUTPATIENT
Start: 2018-10-01 | End: 2018-11-15

## 2018-10-01 NOTE — PROGRESS NOTES
Subjective:     Chief Complaint:  Dyspareunia  History of Present Illness: Rosalina Jacobo is a 43 y.o. female who presents for dyspareunia and possible pelvic relaxation.  She says her  hit her bladder during sexual activity and it was painful.  She has seen  for UTIs was found to have an elevated residual .  She holds her urine for a very long time before by voiding and has postvoid fullness and persistent urgency after voiding.  She had a suburethral sling at the time of her hysterectomy but says she sometimes has stress incontinence and continues to have urge incontinence.      Review of Systems    Constitutional: ADHD  Eyes: No vision changes.  ENT: No headaches.   Respiratory:  Smoker  Cardiovascular: No chest pain. No edema.   Gastrointestinal:  IBS   Genitourinary: No vaginal bleeding or discharge.  Integument/Breast: Negative  Hematologic/Lymphatic: No history of anemia.  Musculoskeletal:  Spondylosis  Neurological:  CVA  Behavioral/Psych:  Bipolar   Endocrine: No hormonal replacement.  Allergy/Immune: no recent reactions     Objective:   General Exam:  General appearance: WDNF. NAD.   HEENT: Kerry. EOM's intact.  Neck: Normal thyroid.   Back: No CVA tenderness.  RESP: No SOB.  Breasts: deferred  Abdomen: Benign without localizing signs.  Extremities: No edema. No varices.  Lymphatic: noncontributory  Skin:  scaling lesions on the arms  Neurologic: Intact.   Psych: Oriented.   Pelvic Exam:  V:  No lesions. No palpable nodes.   Va:  Moderate physiologic discharge.  Minimal relaxation with normal  vaginal  caliber.  .Meatus:No caruncle or stenosis but small  Urethra: Non tender. No suburethral masses.  Minimal mobility  Cx/Cuff: Normal   Uterus: Surgically absent.  Ad: No mass or tenderness.  Levators :Symmetrical.  Increased tone.  tender.  BL:  tender to palpation and she indicates this is where she has pain during sex  RV: No hemorrhoids.  Assessment:   Tender bladder which may  be due to interstitial cystitis and which would explain her dyspareunia  High tone pelvic floor dysfunction.  It is not clear if this is primary cause of her pelvic pain or a fit secondary to the bladder discomfort   Elevated residual urine-this may be due to her high tone pelvic floor dysfunction but could also be a mechanical effect from her sling     Procedure note; catheterization; the urethra was prepped with Betadine and lidocaine jelly was instilled with Uro jet; on catheterization with a 14 Azeri red rubber catheter she had about 200 mL of clear urine despite having just voided a few minutes before  Plan:    Urine culture  Flomax then follow up with  for voiding dysfunction and elevated residual-she also may need cystoscopy with hydrodistention to evaluate for interstitial cystitis  I want to see her for follow-up to determine if she will need treatment for the pelvic floor dysfunction

## 2018-10-01 NOTE — LETTER
October 1, 2018      Luna Slater MD  [address] [place] [address] Dr  [address]  [place] [address]           [facility]  [address] [facility], [address]  [place] [address]  Phone: [phone]  Fax: [phone]          Patient: Rosalina Jacobo   MR Number: [id number]   YOB: 1975   Date of Visit: 10/1/2018       Dear Dr. Luna Slater:    Thank you for referring Rosalina Jacobo to me for evaluation. Attached you will find relevant portions of my assessment and plan of care.    If you have questions, please do not hesitate to call me. I look forward to following Rosalina Jacobo along with you.    Sincerely,    Danish Pepe MD    Enclosure  CC:  No Recipients    If you would like to receive this communication electronically, please contact [email or web address] or [phone] to request more information on [facility] access.    For providers and/or their staff who would like to refer a patient to [facility], please contact us through our one-stop-shop provider referral line, [facility], at [phone].    If you feel you have received this communication in error or would no longer like to receive these types of communications, please e-mail [email or web address]

## 2018-10-02 ENCOUNTER — TELEPHONE (OUTPATIENT)
Dept: UROLOGY | Facility: CLINIC | Age: 43
End: 2018-10-02

## 2018-10-02 NOTE — TELEPHONE ENCOUNTER
Patient was treated please see previous note where patient informed of results. Also patient has follow up on 10/18 with  per previous note.

## 2018-10-02 NOTE — TELEPHONE ENCOUNTER
Please make f/u with taqueria in 1 month to see if she was treated for her uti and her vaginosis screen. If she was not, she needs to be checked again for this and treated she also needs another residual after treatment of her uti.. If she is treated for her uti and bacterial vaginosi and she still has pain then she needs referral for pelvic florr PT for high tone pelvic floor dysfunction. She can f/u w me 3 months after she starts pt if she goes for this. If her pain resolves but she still has elevated urine residuals she needs to be scheduled for urodynamics with a 2 week f/u with me prior   ===View-only below this line===    ----- Message -----  From: Danish Pepe MD  Sent: 10/1/2018  11:28 AM  To: Luna Slater MD

## 2018-10-03 LAB — BACTERIA UR CULT: NO GROWTH

## 2018-10-15 RX ORDER — METRONIDAZOLE 500 MG/1
TABLET ORAL
Qty: 14 TABLET | Refills: 0 | OUTPATIENT
Start: 2018-10-15

## 2018-10-15 NOTE — TELEPHONE ENCOUNTER
Phoned patient no answer no voicemail to leave a message. Patient is very hard to get in contact. Start calling on 9/17 to give results did not speak to patient until 9/28

## 2018-10-15 NOTE — TELEPHONE ENCOUNTER
Pt requesting flagyl, ensure she took the last time I wrote her for it.  If she didn't then I will write for rx. Otherwise has f/u appt with me on 10/18 and will write then. In the meantime she needs to start a probiotic  Last seen by

## 2018-10-18 ENCOUNTER — TELEPHONE (OUTPATIENT)
Dept: UROLOGY | Facility: CLINIC | Age: 43
End: 2018-10-18

## 2018-10-18 NOTE — TELEPHONE ENCOUNTER
Spoke with patient she states she just woke up and will not be able to make it to her 10am appointment with Dangelo today requesting a afternoon appointment today. Per  no afternoon appointment available today can follow up with NP. Patient informed of recommendations appointment rescheduled for tomorrow with NP.

## 2018-10-18 NOTE — TELEPHONE ENCOUNTER
----- Message from Edgardo Cassidy sent at 10/18/2018  9:48 AM CDT -----  Contact: Pt  Name of Who is Calling: Rosalina      What is the request in detail: Patient is calling requesting to speak with a nurse in regards to rescheduling her appointment for a later time today. Patient states she won't be able to make 10am.      Can the clinic reply by [facility]:       What Number to Call Back if not in [facility]: [phone] (home)

## 2018-11-12 ENCOUNTER — TELEPHONE (OUTPATIENT)
Dept: UROGYNECOLOGY | Facility: CLINIC | Age: 43
End: 2018-11-12

## 2018-11-12 ENCOUNTER — TELEPHONE (OUTPATIENT)
Dept: UROLOGY | Facility: CLINIC | Age: 43
End: 2018-11-12

## 2018-11-12 ENCOUNTER — OFFICE VISIT (OUTPATIENT)
Dept: UROGYNECOLOGY | Facility: CLINIC | Age: 43
End: 2018-11-12
Payer: MEDICARE

## 2018-11-12 VITALS
BODY MASS INDEX: 23.81 KG/M2 | WEIGHT: 142.88 LBS | DIASTOLIC BLOOD PRESSURE: 77 MMHG | HEIGHT: 65 IN | HEART RATE: 95 BPM | SYSTOLIC BLOOD PRESSURE: 108 MMHG

## 2018-11-12 DIAGNOSIS — N94.19 DYSPAREUNIA DUE TO MEDICAL CONDITION IN FEMALE: ICD-10-CM

## 2018-11-12 DIAGNOSIS — R39.89 CYSTALGIA: ICD-10-CM

## 2018-11-12 DIAGNOSIS — R35.0 URINARY FREQUENCY: Primary | ICD-10-CM

## 2018-11-12 DIAGNOSIS — M62.89 HIGH-TONE PELVIC FLOOR DYSFUNCTION: ICD-10-CM

## 2018-11-12 LAB
BILIRUB SERPL-MCNC: ABNORMAL MG/DL
BLOOD URINE, POC: ABNORMAL
COLOR, POC UA: YELLOW
GLUCOSE UR QL STRIP: ABNORMAL
KETONES UR QL STRIP: ABNORMAL
LEUKOCYTE ESTERASE URINE, POC: ABNORMAL
NITRITE, POC UA: POSITIVE
PH, POC UA: 7
PROTEIN, POC: ABNORMAL
SPECIFIC GRAVITY, POC UA: 1.01
UROBILINOGEN, POC UA: ABNORMAL

## 2018-11-12 PROCEDURE — 99213 OFFICE O/P EST LOW 20 MIN: CPT | Mod: PBBFAC,PO | Performed by: OBSTETRICS & GYNECOLOGY

## 2018-11-12 PROCEDURE — 99213 OFFICE O/P EST LOW 20 MIN: CPT | Mod: S$PBB,,, | Performed by: OBSTETRICS & GYNECOLOGY

## 2018-11-12 PROCEDURE — 99999 PR PBB SHADOW E&M-EST. PATIENT-LVL III: CPT | Mod: PBBFAC,,, | Performed by: OBSTETRICS & GYNECOLOGY

## 2018-11-12 PROCEDURE — 87186 SC STD MICRODIL/AGAR DIL: CPT

## 2018-11-12 PROCEDURE — 87077 CULTURE AEROBIC IDENTIFY: CPT

## 2018-11-12 PROCEDURE — 87088 URINE BACTERIA CULTURE: CPT

## 2018-11-12 PROCEDURE — 81002 URINALYSIS NONAUTO W/O SCOPE: CPT | Mod: PBBFAC,PO | Performed by: OBSTETRICS & GYNECOLOGY

## 2018-11-12 PROCEDURE — 87086 URINE CULTURE/COLONY COUNT: CPT

## 2018-11-12 RX ORDER — CARIPRAZINE 3 MG/1
3 CAPSULE, GELATIN COATED ORAL DAILY
Refills: 2 | Status: ON HOLD | COMMUNITY
Start: 2018-10-16 | End: 2021-02-22 | Stop reason: HOSPADM

## 2018-11-12 RX ORDER — HALOPERIDOL 10 MG/1
TABLET ORAL
Refills: 4 | COMMUNITY
Start: 2018-10-26 | End: 2019-12-02

## 2018-11-12 RX ORDER — PROMETHAZINE HYDROCHLORIDE AND CODEINE PHOSPHATE 6.25; 1 MG/5ML; MG/5ML
SOLUTION ORAL
Refills: 0 | COMMUNITY
Start: 2018-11-09 | End: 2018-11-15

## 2018-11-12 NOTE — TELEPHONE ENCOUNTER
----- Message from Namita Boo sent at 11/12/2018  1:23 PM CST -----  Type: Needs Medical Advice    Who Called:  Patient  Best Call Back Number: [phone].  Additional Information: Going to have a cat scan with and without contrast on 11/14/18. Patient wants to know if office wants a copy. Please call to advise.

## 2018-11-12 NOTE — PROGRESS NOTES
Subjective:     Chief Complaint:  Dyspareunia  History of Present Illness: Rosalina Jacobo is a 43 y.o. female who presents for dyspareunia bladder pain and pelvic floor dysfunction.  She was seen initially by  for recurrent UTI symptoms and had an elevated residual.  When we 1st saw her her residual was again elevated at 200 mL she was found to have very tender bladder and high tone pelvic floor dysfunction.  She was to follow-up with Dr. guillermina yao but the appointment did not occur because of miscommunication.  She additionally complains today of right abdominal pain which she describes as a twisting sensation and occurs often on.  She has no known triggers and may last minutes to hours.  She does not report any change in her bowel habits and this pain has developed    Review of Systems    Constitutional: ADD  Eyes: No vision changes.  ENT:  headaches.   Respiratory:  Bronchitis  Cardiovascular: No chest pain. No edema. Hypertension  Gastrointestinal: IBS  Genitourinary: No vaginal bleeding or discharge.  Integument/Breast: Negative  Hematologic/Lymphatic: No history of anemia.  Musculoskeletal:  back pain.  abdominal pain.  Neurological: DDD  Behavioral/Psych:  Bipolar   Endocrine: No hormonal replacement.  Allergy/Immune: no recent reactions     Objective:   General Exam:  General appearance: WDNF. NAD.   HEENT: Kerry. EOM's intact.  Neck: Normal thyroid.   Back: No CVA tenderness.  RESP: No SOB.  Breasts: deferred  Abdomen:  Soft with no rebound guarding or masses noted.  Extremities: No edema. No varices.  Lymphatic: noncontributory  Skin: No rashes. No lesions.  Neurologic: Intact.   Psych: Oriented.   Pelvic Exam:  V:  No lesions. No palpable nodes.   Va:No d/c bleeding or lesions.  Minimal pelvic relaxation  .Meatus:No caruncle or stenosis  Urethra: Non tender. No suburethral masses.  Cx/Cuff: Normal   Uterus: Surgically absent.  Ad: No mass or tenderness.  Levators :  Bilaterally  tender but more so on the left.  There is moderate increase in pelvic floor tone  BL:  Location of her most significant tenderness  RV: No hemorrhoids.  Assessment:   Her most  tender site is the bladder itself so we have to consider interstitial cystitis.  Her pelvic floor dysfunction and even the abdominal pain and her dyspareunia certainly would be likely secondary  findings from IC.       Plan:    Not a good candidate for potassium permit ability test so I think she probably will need cystoscopy with hydrodistention to further delineate.  While we could start her on physical therapy for the pelvic floor, and if she has IC I do not think pelvic floor respond well without treating the IC    Addendum;uribel prescription (including with prior authorization attempts) was declined by insurance company based on no coverage by Medicare.  I sent in a prescription for hyoscyamine to see if this will be covered.

## 2018-11-13 ENCOUNTER — TELEPHONE (OUTPATIENT)
Dept: UROGYNECOLOGY | Facility: CLINIC | Age: 43
End: 2018-11-13

## 2018-11-13 NOTE — TELEPHONE ENCOUNTER
Called pharmacy to get the prior authorization on the Uribel. But no one answerers the phone at [facility] , left a message for patient that I was trying to reach the pharmacy.

## 2018-11-13 NOTE — TELEPHONE ENCOUNTER
----- Message from Danay Leslie sent at 11/13/2018 12:37 PM CST -----  Contact: Patient  Type:  RX Refill Request    Who Called:  Rosalina  Refill or New Rx:  new  RX Name and Strength:  methen-m.blue-s.phos-phsal-hyo (URIBEL) 118-10-40.8-36 mg Cap  How is the patient currently taking it? (ex. 1XDay): Take 1 capsule by mouth 3 (three) times daily. As needed for bladder pain - Oral  Is this a 30 day or 90 day RX:  20  Preferred Pharmacy with phone number:    [facility] [address] [place], [place] - [address] [facility] [address]  [address]  [place] [facility] [address]  Phone: [phone] Fax: [phone]  Local or Mail Order:  local  Ordering Provider:  Dr. Danish Pepe  Best Call Back Number:  [phone]  Additional Information:  Needs prior authorization.  Please call when ready  Thank you

## 2018-11-14 ENCOUNTER — TELEPHONE (OUTPATIENT)
Dept: UROLOGY | Facility: CLINIC | Age: 43
End: 2018-11-14

## 2018-11-14 RX ORDER — HYOSCYAMINE SULFATE 0.38 MG/1
0.38 TABLET, EXTENDED RELEASE ORAL EVERY 12 HOURS
Qty: 60 TABLET | Refills: 11 | Status: SHIPPED | OUTPATIENT
Start: 2018-11-14 | End: 2020-04-08

## 2018-11-14 NOTE — TELEPHONE ENCOUNTER
----- Message from Jacey Cooper sent at 11/14/2018  2:09 PM CST -----  Contact: self [phone]  States that she is calling to check on the medication that was to be called in for her. Pt uses    [facility] [id number]  [place], [place] - [address] [facility] [address] [address]  Phone: [phone] Fax: [phone]    Please call back at [phone]//thank you acc

## 2018-11-14 NOTE — TELEPHONE ENCOUNTER
Pharmacy is rejecting the hyoscyamine, spoke to Eduar and called and left message for pt to try azo 2 po up to TID if needed till we can get this taken care of

## 2018-11-14 NOTE — TELEPHONE ENCOUNTER
Spoke with patient message left for wrong provider about prescription. Please give patient a call to discuss

## 2018-11-14 NOTE — TELEPHONE ENCOUNTER
Applied for prior authorization on the  Uribel , but  this drug is excluded from medicare coverage . Is there  Something else you would like to call in?

## 2018-11-15 ENCOUNTER — OFFICE VISIT (OUTPATIENT)
Dept: UROLOGY | Facility: CLINIC | Age: 43
End: 2018-11-15
Payer: MEDICARE

## 2018-11-15 ENCOUNTER — TELEPHONE (OUTPATIENT)
Dept: UROGYNECOLOGY | Facility: CLINIC | Age: 43
End: 2018-11-15

## 2018-11-15 VITALS
BODY MASS INDEX: 23.81 KG/M2 | SYSTOLIC BLOOD PRESSURE: 100 MMHG | HEART RATE: 75 BPM | DIASTOLIC BLOOD PRESSURE: 70 MMHG | WEIGHT: 142.88 LBS | HEIGHT: 65 IN

## 2018-11-15 DIAGNOSIS — N94.10 DYSPAREUNIA, FEMALE: Primary | ICD-10-CM

## 2018-11-15 DIAGNOSIS — N39.3 SUI (STRESS URINARY INCONTINENCE, FEMALE): ICD-10-CM

## 2018-11-15 DIAGNOSIS — M62.89 HIGH-TONE PELVIC FLOOR DYSFUNCTION: ICD-10-CM

## 2018-11-15 DIAGNOSIS — R39.198 INCREASING RESIDUAL URINE: ICD-10-CM

## 2018-11-15 DIAGNOSIS — N30.10 INTERSTITIAL CYSTITIS: ICD-10-CM

## 2018-11-15 LAB
BACTERIA UR CULT: NORMAL
BILIRUB SERPL-MCNC: NORMAL MG/DL
BLOOD URINE, POC: NORMAL
COLOR, POC UA: YELLOW
GLUCOSE UR QL STRIP: NORMAL
KETONES UR QL STRIP: NORMAL
LEUKOCYTE ESTERASE URINE, POC: NORMAL
NITRITE, POC UA: NORMAL
PH, POC UA: 6
PROTEIN, POC: NORMAL
SPECIFIC GRAVITY, POC UA: 1.01
UROBILINOGEN, POC UA: NORMAL

## 2018-11-15 PROCEDURE — 81002 URINALYSIS NONAUTO W/O SCOPE: CPT | Mod: PBBFAC,PN | Performed by: UROLOGY

## 2018-11-15 PROCEDURE — 87660 TRICHOMONAS VAGIN DIR PROBE: CPT

## 2018-11-15 PROCEDURE — 51701 INSERT BLADDER CATHETER: CPT | Mod: PBBFAC,PN | Performed by: UROLOGY

## 2018-11-15 PROCEDURE — 99999 PR PBB SHADOW E&M-EST. PATIENT-LVL V: CPT | Mod: PBBFAC,,, | Performed by: UROLOGY

## 2018-11-15 PROCEDURE — 99215 OFFICE O/P EST HI 40 MIN: CPT | Mod: PBBFAC,PN,25 | Performed by: UROLOGY

## 2018-11-15 PROCEDURE — 99215 OFFICE O/P EST HI 40 MIN: CPT | Mod: S$PBB,,, | Performed by: UROLOGY

## 2018-11-15 RX ORDER — LEVOFLOXACIN 500 MG/1
500 TABLET, FILM COATED ORAL 2 TIMES DAILY
COMMUNITY
End: 2018-12-04

## 2018-11-15 RX ORDER — CIPROFLOXACIN 2 MG/ML
400 INJECTION, SOLUTION INTRAVENOUS
Status: CANCELLED | OUTPATIENT
Start: 2018-11-15

## 2018-11-15 RX ORDER — SULFAMETHOXAZOLE AND TRIMETHOPRIM 800; 160 MG/1; MG/1
1 TABLET ORAL 2 TIMES DAILY
Qty: 10 TABLET | Refills: 0 | Status: SHIPPED | OUTPATIENT
Start: 2018-11-15 | End: 2018-11-20
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
## 2018-11-15 NOTE — TELEPHONE ENCOUNTER
Called and spoke to pt and informed of the UTI and also to get the AZO, since her insurance is not paying for the  Last medication we sent in. States understanding and call was ended.

## 2018-11-15 NOTE — PATIENT INSTRUCTIONS
Cysto hydrodistension on dec 12  Cbc and bmp in preop 1 week prior (blood tests), call preop and make an apopintment to see 1 week before procedure  Clearance from dr victoria for general anesthesia  Urine sample 7d prior -9d prior to dec 12  Pelvic floor phyiscal therapy- send over all my notes and dr bran notes. Dx: high tone pelvic floor- [facility]. Call them in 1 week if no work from them    Printed rx for valium supp. Give her [facility] info   Call us if not available  Use 30 min to 1 hour prior to physical therapy
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
## 2018-11-15 NOTE — TELEPHONE ENCOUNTER
"Called pharmacy and the "hyoscyamine" cost $107.00 for cash price and the insurance does not cover this medication , did leave a message yesterday stating she can try the azo 2 tabs tid. Please advise if anything else??  "

## 2018-11-15 NOTE — TELEPHONE ENCOUNTER
Her urine culture was positive for E. Coli.  I sent in antibiotics.  She can do the Azo OTC, but treating the UTI, should hopefully help with some of her symptoms as well.

## 2018-11-16 LAB
CANDIDA RRNA VAG QL PROBE: NEGATIVE
G VAGINALIS RRNA GENITAL QL PROBE: NEGATIVE
T VAGINALIS RRNA GENITAL QL PROBE: NEGATIVE

## 2018-11-30 ENCOUNTER — TELEPHONE (OUTPATIENT)
Dept: UROLOGY | Facility: CLINIC | Age: 43
End: 2018-11-30

## 2018-11-30 NOTE — TELEPHONE ENCOUNTER
Spoke with staff, asked for clearance from Dr. Fairchild on general anesthesia. Gave the staff the fax number.

## 2018-11-30 NOTE — TELEPHONE ENCOUNTER
----- Message from Luna Slater MD sent at 11/30/2018  1:52 PM CST -----  Regarding: FW: clearance for hydrodistension by dr victoria  Please chaeck on clearance  shceduled for next week  ----- Message -----  From: Areli Wolf LPN  Sent: 11/29/2018   4:28 PM  To: Luna Slater MD  Subject: FW: clearance for hydrodistension by dr willard#    I will call tomorrow no word  ----- Message -----  From: Luna Slater MD  Sent: 11/29/2018   4:27 PM  To: Nohemy Carrasco Staff  Subject: FW: clearance for hydrodistension by dr willard#    Any word?    ----- Message -----  From: Areli Wolf LPN  Sent: 11/27/2018   1:46 PM  To: Luna Slater MD  Subject: RE: clearance for hydrodistension by dr willard#    Called today she is have a echo done tomorrow    ----- Message -----  From: Luna Slater MD  Sent: 11/27/2018   1:40 PM  To: Nohemy Carrasco Staff  Subject: FW: clearance for hydrodistension by dr willard#    Cbc and bmp in preop 1 week prior  Clearance from dr victoria for general anesthesia  Pelvic floor phyiscal therapy- send over all my notes and dr bran notes. Dx: high tone pelvic floor  Urine sample 7d prior -9d prior to dec 12  ----- Message -----  From: Luna Slater MD  Sent: 11/15/2018   3:37 PM  To: Luna Slater MD, Areli Wolf LPN  Subject: clearance for hydrodistension by dr victoria       Cysto hydrodistension on dec 12  Cbc and bmp in preop 1 week prior  Clearance from dr victoria for general anesthesia  Pelvic floor phyiscal therapy- send over all my notes and dr bran notes. Dx: high tone pelvic floor  Urine sample 7d prior -9d prior to dec 12  Printed rx for valium supp. Give her [facility] info

## 2018-12-03 ENCOUNTER — TELEPHONE (OUTPATIENT)
Dept: UROLOGY | Facility: CLINIC | Age: 43
End: 2018-12-03

## 2018-12-03 DIAGNOSIS — R82.998 CELLS AND CASTS IN URINE: Primary | ICD-10-CM

## 2018-12-03 NOTE — TELEPHONE ENCOUNTER
Awaiting clearance from   Pt should have preop appt 1 week prior for urine and labs  Procedure is dec 12th (not the 5th)

## 2018-12-03 NOTE — TELEPHONE ENCOUNTER
----- Message from Luna Slater MD sent at 11/15/2018  3:37 PM CST -----  Regarding: clearance for hydrodistension by dr victoria  Cysto hydrodistension on dec 12  Cbc and bmp in preop 1 week prior  Clearance from dr victoria for general anesthesia  Pelvic floor phyiscal therapy- send over all my notes and dr bran notes. Dx: high tone pelvic floor  Urine sample 7d prior -9d prior to dec 12  Printed rx for valium supp. Give her [facility] info

## 2018-12-04 ENCOUNTER — HOSPITAL ENCOUNTER (OUTPATIENT)
Dept: RADIOLOGY | Facility: HOSPITAL | Age: 43
Discharge: HOME | End: 2018-12-04
Attending: EMERGENCY MEDICINE
Payer: MEDICARE

## 2018-12-04 ENCOUNTER — HOSPITAL ENCOUNTER (OUTPATIENT)
Dept: PREADMISSION TESTING | Facility: HOSPITAL | Age: 43
Discharge: HOME | End: 2018-12-04
Attending: UROLOGY
Payer: MEDICARE

## 2018-12-04 VITALS — BODY MASS INDEX: 23.63 KG/M2 | WEIGHT: 147 LBS | HEIGHT: 66 IN

## 2018-12-04 DIAGNOSIS — Z01.818 PREOP EXAMINATION: ICD-10-CM

## 2018-12-04 DIAGNOSIS — Z01.818 PREOP EXAMINATION: Primary | ICD-10-CM

## 2018-12-04 DIAGNOSIS — N94.10 DYSPAREUNIA, FEMALE: ICD-10-CM

## 2018-12-04 PROCEDURE — 71046 X-RAY EXAM CHEST 2 VIEWS: CPT | Mod: 26,,, | Performed by: RADIOLOGY

## 2018-12-04 PROCEDURE — 99900103 DSU ONLY-NO CHARGE-INITIAL HR (STAT)

## 2018-12-04 PROCEDURE — 99900104 DSU ONLY-NO CHARGE-EA ADD'L HR (STAT)

## 2018-12-04 PROCEDURE — 71046 X-RAY EXAM CHEST 2 VIEWS: CPT | Mod: TC,FY

## 2018-12-04 NOTE — DISCHARGE INSTRUCTIONS

## 2018-12-04 NOTE — OR NURSING
Pt had EKG at Dr. Fairchild's today.  I spoke with Erika and asked her to fax copy to me today. Also, Dr. Slater wants a general surgery clearance from Dr. Fairchild after he reviews EKG and labs.  I informed Erika to send the clearance to us via fax asap

## 2018-12-04 NOTE — OR NURSING
Labs from today faxed to Erika in Dr. Fairchild's office with request to fax clearance to us asap.

## 2018-12-04 NOTE — TELEPHONE ENCOUNTER
Spoke with patient she states she is scheduled to see  today for clearance. Will do preop today canceled on yesterday. Will do labs and urine at preop

## 2018-12-10 ENCOUNTER — TELEPHONE (OUTPATIENT)
Dept: UROLOGY | Facility: CLINIC | Age: 43
End: 2018-12-10

## 2018-12-10 NOTE — TELEPHONE ENCOUNTER
----- Message from Romaine Diez sent at 12/10/2018  1:24 PM CST -----  Contact: same  Patient called in and wanted to make sure office received her procedure clearance from Dr. Fairhcild's office?  Patient call back number is [phone]

## 2018-12-11 ENCOUNTER — ANESTHESIA EVENT (OUTPATIENT)
Dept: SURGERY | Facility: HOSPITAL | Age: 43
End: 2018-12-11
Payer: MEDICARE

## 2018-12-12 ENCOUNTER — TELEPHONE (OUTPATIENT)
Dept: UROLOGY | Facility: CLINIC | Age: 43
End: 2018-12-12

## 2018-12-12 ENCOUNTER — HOSPITAL ENCOUNTER (OUTPATIENT)
Facility: HOSPITAL | Age: 43
Discharge: HOME | End: 2018-12-12
Attending: UROLOGY | Admitting: UROLOGY
Payer: MEDICARE

## 2018-12-12 ENCOUNTER — ANESTHESIA (OUTPATIENT)
Dept: SURGERY | Facility: HOSPITAL | Age: 43
End: 2018-12-12
Payer: MEDICARE

## 2018-12-12 VITALS
HEIGHT: 66 IN | DIASTOLIC BLOOD PRESSURE: 52 MMHG | RESPIRATION RATE: 16 BRPM | HEART RATE: 70 BPM | TEMPERATURE: 98 F | BODY MASS INDEX: 23.63 KG/M2 | OXYGEN SATURATION: 99 % | WEIGHT: 147 LBS | SYSTOLIC BLOOD PRESSURE: 84 MMHG

## 2018-12-12 DIAGNOSIS — N94.10 DYSPAREUNIA, FEMALE: ICD-10-CM

## 2018-12-12 DIAGNOSIS — J45.20 MILD INTERMITTENT ASTHMA WITHOUT COMPLICATION: ICD-10-CM

## 2018-12-12 DIAGNOSIS — N30.10 INTERSTITIAL CYSTITIS: ICD-10-CM

## 2018-12-12 DIAGNOSIS — R09.89 CHRONIC SINUS COMPLAINTS: ICD-10-CM

## 2018-12-12 PROCEDURE — 88305 TISSUE EXAM BY PATHOLOGIST: CPT | Performed by: PATHOLOGY

## 2018-12-12 PROCEDURE — 88305 TISSUE EXAM BY PATHOLOGIST: CPT | Mod: 26,,, | Performed by: PATHOLOGY

## 2018-12-12 PROCEDURE — 27200651 HC AIRWAY, LMA: Performed by: NURSE ANESTHETIST, CERTIFIED REGISTERED

## 2018-12-12 PROCEDURE — 37000009 HC ANESTHESIA EA ADD 15 MINS: Performed by: UROLOGY

## 2018-12-12 PROCEDURE — 25000003 PHARM REV CODE 250: Performed by: ANESTHESIOLOGY

## 2018-12-12 PROCEDURE — 52204 CYSTOSCOPY W/BIOPSY(S): CPT | Mod: 59,,, | Performed by: UROLOGY

## 2018-12-12 PROCEDURE — 63600175 PHARM REV CODE 636 W HCPCS: Performed by: UROLOGY

## 2018-12-12 PROCEDURE — 63600175 PHARM REV CODE 636 W HCPCS: Performed by: ANESTHESIOLOGY

## 2018-12-12 PROCEDURE — 71000015 HC POSTOP RECOV 1ST HR: Performed by: UROLOGY

## 2018-12-12 PROCEDURE — 25000003 PHARM REV CODE 250: Performed by: NURSE ANESTHETIST, CERTIFIED REGISTERED

## 2018-12-12 PROCEDURE — 99900103 DSU ONLY-NO CHARGE-INITIAL HR (STAT): Performed by: UROLOGY

## 2018-12-12 PROCEDURE — 99900104 DSU ONLY-NO CHARGE-EA ADD'L HR (STAT): Performed by: UROLOGY

## 2018-12-12 PROCEDURE — 37000008 HC ANESTHESIA 1ST 15 MINUTES: Performed by: UROLOGY

## 2018-12-12 PROCEDURE — 36000706: Performed by: UROLOGY

## 2018-12-12 PROCEDURE — 71000033 HC RECOVERY, INTIAL HOUR: Performed by: UROLOGY

## 2018-12-12 PROCEDURE — 63600175 PHARM REV CODE 636 W HCPCS: Performed by: NURSE ANESTHETIST, CERTIFIED REGISTERED

## 2018-12-12 PROCEDURE — 36000707: Performed by: UROLOGY

## 2018-12-12 PROCEDURE — D9220A PRA ANESTHESIA: Mod: ANES,,, | Performed by: ANESTHESIOLOGY

## 2018-12-12 PROCEDURE — 25000003 PHARM REV CODE 250: Performed by: UROLOGY

## 2018-12-12 PROCEDURE — 52260 CYSTOSCOPY AND TREATMENT: CPT | Mod: ,,, | Performed by: UROLOGY

## 2018-12-12 PROCEDURE — D9220A PRA ANESTHESIA: Mod: CRNA,,, | Performed by: NURSE ANESTHETIST, CERTIFIED REGISTERED

## 2018-12-12 RX ORDER — CEFAZOLIN SODIUM 1 G/50ML
1 SOLUTION INTRAVENOUS
Status: DISCONTINUED | OUTPATIENT
Start: 2018-12-12 | End: 2018-12-12 | Stop reason: HOSPADM

## 2018-12-12 RX ORDER — DEXAMETHASONE SODIUM PHOSPHATE 4 MG/ML
INJECTION, SOLUTION INTRA-ARTICULAR; INTRALESIONAL; INTRAMUSCULAR; INTRAVENOUS; SOFT TISSUE
Status: DISCONTINUED | OUTPATIENT
Start: 2018-12-12 | End: 2018-12-12

## 2018-12-12 RX ORDER — ONDANSETRON 2 MG/ML
4 INJECTION INTRAMUSCULAR; INTRAVENOUS DAILY PRN
Status: DISCONTINUED | OUTPATIENT
Start: 2018-12-12 | End: 2018-12-12 | Stop reason: HOSPADM

## 2018-12-12 RX ORDER — FENTANYL CITRATE 50 UG/ML
INJECTION, SOLUTION INTRAMUSCULAR; INTRAVENOUS
Status: DISCONTINUED | OUTPATIENT
Start: 2018-12-12 | End: 2018-12-12

## 2018-12-12 RX ORDER — CEFAZOLIN SODIUM 1 G/3ML
INJECTION, POWDER, FOR SOLUTION INTRAMUSCULAR; INTRAVENOUS
Status: DISCONTINUED | OUTPATIENT
Start: 2018-12-12 | End: 2018-12-12

## 2018-12-12 RX ORDER — DIPHENHYDRAMINE HCL 25 MG
25 CAPSULE ORAL EVERY 6 HOURS PRN
Status: DISCONTINUED | OUTPATIENT
Start: 2018-12-12 | End: 2018-12-12 | Stop reason: HOSPADM

## 2018-12-12 RX ORDER — MEPERIDINE HYDROCHLORIDE 50 MG/ML
12.5 INJECTION INTRAMUSCULAR; INTRAVENOUS; SUBCUTANEOUS ONCE AS NEEDED
Status: DISCONTINUED | OUTPATIENT
Start: 2018-12-12 | End: 2018-12-12 | Stop reason: HOSPADM

## 2018-12-12 RX ORDER — HYOSCYAMINE SULFATE 0.38 MG/1
0.38 TABLET, EXTENDED RELEASE ORAL EVERY 12 HOURS
Qty: 60 TABLET | Refills: 11 | Status: CANCELLED | OUTPATIENT
Start: 2018-12-12

## 2018-12-12 RX ORDER — GLYCOPYRROLATE 0.2 MG/ML
INJECTION INTRAMUSCULAR; INTRAVENOUS
Status: DISCONTINUED | OUTPATIENT
Start: 2018-12-12 | End: 2018-12-12

## 2018-12-12 RX ORDER — HYDROCODONE BITARTRATE AND ACETAMINOPHEN 5; 325 MG/1; MG/1
1 TABLET ORAL EVERY 6 HOURS PRN
Qty: 8 TABLET | Refills: 0 | Status: SHIPPED | OUTPATIENT
Start: 2018-12-12 | End: 2018-12-22

## 2018-12-12 RX ORDER — CIPROFLOXACIN 2 MG/ML
400 INJECTION, SOLUTION INTRAVENOUS
Status: COMPLETED | OUTPATIENT
Start: 2018-12-12 | End: 2018-12-12

## 2018-12-12 RX ORDER — SODIUM CHLORIDE, SODIUM LACTATE, POTASSIUM CHLORIDE, CALCIUM CHLORIDE 600; 310; 30; 20 MG/100ML; MG/100ML; MG/100ML; MG/100ML
INJECTION, SOLUTION INTRAVENOUS CONTINUOUS
Status: DISCONTINUED | OUTPATIENT
Start: 2018-12-12 | End: 2022-09-29

## 2018-12-12 RX ORDER — MIDAZOLAM HYDROCHLORIDE 1 MG/ML
INJECTION, SOLUTION INTRAMUSCULAR; INTRAVENOUS
Status: DISCONTINUED | OUTPATIENT
Start: 2018-12-12 | End: 2018-12-12

## 2018-12-12 RX ORDER — SODIUM CHLORIDE 0.9 % (FLUSH) 0.9 %
3 SYRINGE (ML) INJECTION
Status: DISCONTINUED | OUTPATIENT
Start: 2018-12-12 | End: 2018-12-12 | Stop reason: HOSPADM

## 2018-12-12 RX ORDER — PROPOFOL 10 MG/ML
VIAL (ML) INTRAVENOUS
Status: DISCONTINUED | OUTPATIENT
Start: 2018-12-12 | End: 2018-12-12

## 2018-12-12 RX ORDER — SODIUM CHLORIDE 0.9 % (FLUSH) 0.9 %
3 SYRINGE (ML) INJECTION EVERY 8 HOURS
Status: DISCONTINUED | OUTPATIENT
Start: 2018-12-12 | End: 2018-12-12 | Stop reason: HOSPADM

## 2018-12-12 RX ORDER — FENTANYL CITRATE 50 UG/ML
25 INJECTION, SOLUTION INTRAMUSCULAR; INTRAVENOUS EVERY 5 MIN PRN
Status: COMPLETED | OUTPATIENT
Start: 2018-12-12 | End: 2018-12-12

## 2018-12-12 RX ORDER — LIDOCAINE HYDROCHLORIDE 20 MG/ML
JELLY TOPICAL
Status: DISCONTINUED | OUTPATIENT
Start: 2018-12-12 | End: 2018-12-12 | Stop reason: HOSPADM

## 2018-12-12 RX ORDER — OXYCODONE HYDROCHLORIDE 5 MG/1
5 TABLET ORAL
Status: DISCONTINUED | OUTPATIENT
Start: 2018-12-12 | End: 2018-12-12 | Stop reason: HOSPADM

## 2018-12-12 RX ORDER — MONTELUKAST SODIUM 10 MG/1
10 TABLET ORAL NIGHTLY
Qty: 30 TABLET | Refills: 11 | Status: SHIPPED | OUTPATIENT
Start: 2018-12-12 | End: 2020-04-08

## 2018-12-12 RX ORDER — LIDOCAINE HCL/PF 100 MG/5ML
SYRINGE (ML) INTRAVENOUS
Status: DISCONTINUED | OUTPATIENT
Start: 2018-12-12 | End: 2018-12-12

## 2018-12-12 RX ORDER — LIDOCAINE HYDROCHLORIDE 10 MG/ML
1 INJECTION, SOLUTION EPIDURAL; INFILTRATION; INTRACAUDAL; PERINEURAL ONCE
Status: COMPLETED | OUTPATIENT
Start: 2018-12-12 | End: 2018-12-12

## 2018-12-12 RX ORDER — ONDANSETRON 2 MG/ML
INJECTION INTRAMUSCULAR; INTRAVENOUS
Status: DISCONTINUED | OUTPATIENT
Start: 2018-12-12 | End: 2018-12-12

## 2018-12-12 RX ORDER — HYDROMORPHONE HYDROCHLORIDE 2 MG/ML
0.2 INJECTION, SOLUTION INTRAMUSCULAR; INTRAVENOUS; SUBCUTANEOUS EVERY 5 MIN PRN
Status: DISCONTINUED | OUTPATIENT
Start: 2018-12-12 | End: 2018-12-12 | Stop reason: HOSPADM

## 2018-12-12 RX ADMIN — LIDOCAINE HYDROCHLORIDE: 10 INJECTION, SOLUTION EPIDURAL; INFILTRATION; INTRACAUDAL; PERINEURAL at 09:12

## 2018-12-12 RX ADMIN — LIDOCAINE HYDROCHLORIDE 100 MG: 20 INJECTION, SOLUTION INTRAVENOUS at 10:12

## 2018-12-12 RX ADMIN — FENTANYL CITRATE 25 MCG: 50 INJECTION INTRAMUSCULAR; INTRAVENOUS at 12:12

## 2018-12-12 RX ADMIN — GLYCOPYRROLATE 0.2 MG: 0.2 INJECTION, SOLUTION INTRAMUSCULAR; INTRAVENOUS at 10:12

## 2018-12-12 RX ADMIN — PROPOFOL 150 MG: 10 INJECTION, EMULSION INTRAVENOUS at 10:12

## 2018-12-12 RX ADMIN — FENTANYL CITRATE 25 MCG: 50 INJECTION INTRAMUSCULAR; INTRAVENOUS at 11:12

## 2018-12-12 RX ADMIN — DEXAMETHASONE SODIUM PHOSPHATE 4 MG: 4 INJECTION, SOLUTION INTRAMUSCULAR; INTRAVENOUS at 10:12

## 2018-12-12 RX ADMIN — CEFAZOLIN 1 G: 1 INJECTION, POWDER, FOR SOLUTION INTRAVENOUS at 11:12

## 2018-12-12 RX ADMIN — FENTANYL CITRATE 100 MCG: 50 INJECTION, SOLUTION INTRAMUSCULAR; INTRAVENOUS at 10:12

## 2018-12-12 RX ADMIN — ONDANSETRON 4 MG: 2 INJECTION, SOLUTION INTRAMUSCULAR; INTRAVENOUS at 11:12

## 2018-12-12 RX ADMIN — SODIUM CHLORIDE, SODIUM LACTATE, POTASSIUM CHLORIDE, AND CALCIUM CHLORIDE: .6; .31; .03; .02 INJECTION, SOLUTION INTRAVENOUS at 09:12

## 2018-12-12 RX ADMIN — CIPROFLOXACIN 400 MG: 2 INJECTION INTRAVENOUS at 10:12

## 2018-12-12 RX ADMIN — MIDAZOLAM 3 MG: 1 INJECTION INTRAMUSCULAR; INTRAVENOUS at 10:12

## 2018-12-12 RX ADMIN — OXYCODONE HYDROCHLORIDE 5 MG: 5 TABLET ORAL at 11:12

## 2018-12-12 NOTE — DISCHARGE INSTRUCTIONS
"Follow up with Dr. Slater in 1 month  No antibiotic needed -  she received ancef and cipro intraop  Return sooner for rash, fever, chills   norco 5 mg and take as prescribed as needed for pain    Discharge Instructions: After Your Surgery/Procedure  Youve just had surgery. During surgery you were given medicine called anesthesia to keep you relaxed and free of pain. After surgery you may have some pain or nausea. This is common. Here are some tips for feeling better and getting well after surgery.     Stay on schedule with your medication.   Going home  Your doctor or nurse will show you how to take care of yourself when you go home. He or she will also answer your questions. Have an adult family member or friend drive you home.      For your safety we recommend these precaution for the first 24 hours after your procedure:  · Do not drive or use heavy equipment.  · Do not make important decisions or sign legal papers.  · Do not drink alcohol.  · Have someone stay with you, if needed. He or she can watch for problems and help keep you safe.  · Your concentration, balance, coordination, and judgement may be impaired for many hours after anesthesia.  Use caution when ambulating or standing up.     · You may feel weak and "washed out" after anesthesia and surgery.      Subtle residual effects of general anesthesia or sedation with regional / local anesthesia can last more than 24 hours.  Rest for the remainder of the day or longer if your Doctor/Surgeon has advised you to do so.  Although you may feel normal within the first 24 hours, your reflexes and mental ability may be impaired without you realizing it.  You may feel dizzy, lightheaded or sleepy for 24 hours or longer.      Be sure to go to all follow-up visits with your doctor. And rest after your surgery for as long as your doctor tells you to.  Coping with pain  If you have pain after surgery, pain medicine will help you feel better. Take it as " told, before pain becomes severe. Also, ask your doctor or pharmacist about other ways to control pain. This might be with heat, ice, or relaxation. And follow any other instructions your surgeon or nurse gives you.  Tips for taking pain medicine  To get the best relief possible, remember these points:  · Pain medicines can upset your stomach. Taking them with a little food may help.  · Most pain relievers taken by mouth need at least 20 to 30 minutes to start to work.  · Taking medicine on a schedule can help you remember to take it. Try to time your medicine so that you can take it before starting an activity. This might be before you get dressed, go for a walk, or sit down for dinner.  · Constipation is a common side effect of pain medicines. Call your doctor before taking any medicines such as laxatives or stool softeners to help ease constipation. Also ask if you should skip any foods. Drinking lots of fluids and eating foods such as fruits and vegetables that are high in fiber can also help. Remember, do not take laxatives unless your surgeon has prescribed them.  · Drinking alcohol and taking pain medicine can cause dizziness and slow your breathing. It can even be deadly. Do not drink alcohol while taking pain medicine.  · Pain medicine can make you react more slowly to things. Do not drive or run machinery while taking pain medicine.  Your health care provider may tell you to take acetaminophen to help ease your pain. Ask him or her how much you are supposed to take each day. Acetaminophen or other pain relievers may interact with your prescription medicines or other over-the-counter (OTC) drugs. Some prescription medicines have acetaminophen and other ingredients. Using both prescription and OTC acetaminophen for pain can cause you to overdose. Read the labels on your OTC medicines with care. This will help you to clearly know the list of ingredients, how much to take, and any warnings. It may also help  you not take too much acetaminophen. If you have questions or do not understand the information, ask your pharmacist or health care provider to explain it to you before you take the OTC medicine.  Managing nausea  Some people have an upset stomach after surgery. This is often because of anesthesia, pain, or pain medicine, or the stress of surgery. These tips will help you handle nausea and eat healthy foods as you get better. If you were on a special food plan before surgery, ask your doctor if you should follow it while you get better. These tips may help:  · Do not push yourself to eat. Your body will tell you when to eat and how much.  · Start off with clear liquids and soup. They are easier to digest.  · Next try semi-solid foods, such as mashed potatoes, applesauce, and gelatin, as you feel ready.  · Slowly move to solid foods. Dont eat fatty, rich, or spicy foods at first.  · Do not force yourself to have 3 large meals a day. Instead eat smaller amounts more often.  · Take pain medicines with a small amount of solid food, such as crackers or toast, to avoid nausea.     Call your surgeon if  · You still have pain an hour after taking medicine. The medicine may not be strong enough.  · You feel too sleepy, dizzy, or groggy. The medicine may be too strong.  · You have side effects like nausea, vomiting, or skin changes, such as rash, itching, or hives.       If you have obstructive sleep apnea  You were given anesthesia medicine during surgery to keep you comfortable and free of pain. After surgery, you may have more apnea spells because of this medicine and other medicines you were given. The spells may last longer than usual.   At home:  · Keep using the continuous positive airway pressure (CPAP) device when you sleep. Unless your health care provider tells you not to, use it when you sleep, day or night. CPAP is a common device used to treat obstructive sleep apnea.  · Talk with your provider before taking  any pain medicine, muscle relaxants, or sedatives. Your provider will tell you about the possible dangers of taking these medicines.  © 8764-3050 [employer]. [address] [place] [address], [place] [address]. All rights reserved. This information is not intended as a substitute for professional medical care. Always follow your healthcare professional's instructions.  Post op instructions for prevention of DVT  What is deep vein thrombosis?  Deep vein thrombosis (DVT) is the medical term for blood clots in the deep veins of the leg.  These blood clots can be dangerous.  A DVT can block a blood vessel and keep blood from getting where it needs to go.  Another problem is that the clot can travel to other parts of the body such as the lungs.  A clot that travels to the lungs is called a pulmonary embolus (PE) and can cause serious problems with breathing which can lead to death.  Am I at risk for DVT/PE?  If you are not very active, you are at risk of DVT.  Anyone confined to bed, sitting for long periods of time, recovering from surgery, etc. increases the risk of DVT.  Other risk factors are cancer diagnosis, certain medications, estrogen replacement in any form,older age, obesity, pregnancy, smoking, history of clotting disorders, and dehydration.  How will I know if I have a DVT?   Swelling in the lower leg   Pain   Warmth, redness, hardness or bulging of the vein  If you have any of these symptoms, call your doctors office right away.  Some people will not have any symptoms until the clot moves to the lungs.  What are the symptoms of a PE?   Panting, shortness of breath, or trouble breathing   Sharp, knife-like chest pain when you breathe   Coughing or coughing up blood   Rapid heartbeat  If you have any of these symptoms or get worse quickly, call 911 for emergency treatment.  How can I prevent a DVT?   Avoid long periods of inactivity and dont cross your legs--get up and walk around every hour or  so.   Stay active--walking after surgery is highly encouraged.  This means you should get out of the house and walk in the neighborhood.  Going up and down stairs will not impair healing (unless advised against such activity by your doctor).     Drink plenty of noncaffeinated, nonalcoholic fluids each day to prevent dehydration.   Wear special support stockings, if they have been advised by your doctor.   If you travel, stop at least once an hour and walk around.   Avoid smoking (assistance with stopping is available through your healthcare provider)  Always notify your doctor if you are not able to follow the post operative instructions that are given to you at the time of discharge.  It may be necessary to prescribe one of the medications available to prevent DVT.  Cystoscopy    Cystoscopy is a procedure that lets your doctor look directly inside your urethra and bladder. It can be used to:  · Help diagnose a problem with your urethra, bladder, or kidneys.  · Take a sample (biopsy) of bladder or urethral tissue.  · Treat certain problems (such as removing kidney stones).  · Place a stent to bypass an obstruction.  · Take special X-rays of the kidneys.  Based on the findings, your doctor may recommend other tests or treatments.  What is a cystoscope?  A cystoscope is a telescope-like instrument that contains lenses and fiberoptics (small glass wires that make bright light). The cystoscope may be straight and rigid, or flexible to bend around curves in the urethra. The doctor may look directly into the cystoscope, or project the image onto a monitor.  Getting ready  · Ask your doctor if you should stop taking any medicines before the procedure.  · Ask whether you should avoid eating or drinking anything after midnight before the procedure.  · Follow any other instructions your doctor gives you.  Tell your doctor before the exam if you:  · Take any medicines, such as aspirin or blood thinners  · Have allergies  to any medicines  · Are pregnant   The procedure  Cystoscopy is done in the doctors office, surgery center, or hospital. The doctor and a nurse are present during the procedure. It takes only a few minutes, longer if a biopsy, X-ray, or treatment needs to be done.  During the procedure:  · You lie on an exam table on your back, knees bent and legs apart. You are covered with a drape.  · Your urethra and the area around it are washed. Anesthetic jelly may be applied to numb the urethra. Other pain medicine is usually not needed. In some cases, you may be offered a mild sedative to help you relax. If a more extensive procedure is to be done, such as a biopsy or kidney stone removal, general anesthesia may be needed.  · The cystoscope is inserted. A sterile fluid is put into the bladder to expand it. You may feel pressure from this fluid.  · When the procedure is done, the cystoscope is removed.  After the procedure  If you had a sedative, general anesthesia, or spinal anesthesia, you must have someone drive you home. Once youre home:  · Drink plenty of fluids.  · You may have burning or light bleeding when you urinate--this is normal.  · Medicines may be prescribed to ease any discomfort or prevent infection. Take these as directed.  · Call your doctor if you have heavy bleeding or blood clots, burning that lasts more than a day, a fever over 100°F  (38° C), or trouble urinating.  Date Last Reviewed: 1/1/2017 © 2000-2017 [employer]. [address] [place] [address], [place] [address]. All rights reserved. This information is not intended as a substitute for professional medical care. Always follow your healthcare professional's instructions.    FOLLOW UP IN ONE MONTH WITH DR. CHRISTIAN

## 2018-12-12 NOTE — ANESTHESIA POSTPROCEDURE EVALUATION
"Anesthesia Post Evaluation    Patient: Rosalina Jacobo    Procedure(s) Performed: Procedure(s) (LRB):  CYSTOSCOPY, WITH BLADDER HYDRODISTENSION (N/A)  DILATION, URETHRA (N/A)  BIOPSY, BLADDER    Final Anesthesia Type: general  Patient location during evaluation: PACU  Patient participation: Yes- Able to Participate  Level of consciousness: awake and alert and oriented  Post-procedure vital signs: reviewed and stable  Pain management: adequate  Airway patency: patent  PONV status at discharge: No PONV  Anesthetic complications: no      Cardiovascular status: blood pressure returned to baseline and stable  Respiratory status: unassisted and spontaneous ventilation  Hydration status: euvolemic  Follow-up not needed.        Visit Vitals  BP (!) 84/52   Pulse 70   Temp 36.6 °C (97.9 °F) (Skin)   Resp 16   Ht 5' 6" (1.676 m)   Wt 66.7 kg (147 lb)   SpO2 99%   Breastfeeding? No   BMI 23.73 kg/m²       Pain/Ian Score: Pain Rating Prior to Med Admin: 5 (12/12/2018 12:15 PM)  Pain Rating Post Med Admin: 0 (12/12/2018 12:15 PM)  Ian Score: 10 (12/12/2018 12:38 PM)        "

## 2018-12-12 NOTE — INTERVAL H&P NOTE
H&P Update:     Indications for today's procedure: pelvic pain, IC, recurrent uti w h/o mesh  Consented today for: cystoscopy, hydrodistension, possible bladder biopsy  The risks and benefits of the procedure were explained today and the pt was written and verbally consented for surgery.     I concur with the findings from the last H&P with studies done since last h&P or changes to the patient's history as below:      Vitals:    12/12/18 0935   BP: (!) 87/63   Pulse: 77   Resp: 18   Temp: 97.7 °F (36.5 °C)       Urine culture 12/5: GBS. (vaginal contaminant). Denies any uti sx.       Anti-coagulation: none      Lab Results   Component Value Date    WBC 9.10 12/04/2018    HGB 12.8 12/04/2018    HCT 38.3 12/04/2018    MCV 88 12/04/2018     12/04/2018     BMP  Lab Results   Component Value Date     12/04/2018    K 3.6 12/04/2018     12/04/2018    CO2 28 12/04/2018    BUN 16 12/04/2018    CREATININE 1.0 12/04/2018    CALCIUM 9.5 12/04/2018    ANIONGAP 8 12/04/2018    ESTGFRAFRICA >60 12/04/2018    EGFRNONAA >60 12/04/2018     Will need valium suppositories from [facility]

## 2018-12-12 NOTE — PROGRESS NOTES
Discharged home per wheelchair per personal vehicle with spouse. aao x 4. No complaints voiced at discharge.

## 2018-12-12 NOTE — PLAN OF CARE
Pt aaox4, denies pain and nausea, tolerating clear liquids, vss, spouse updated, pt released to go to phase Ii by anesthesia, report given to BRANT Villela

## 2018-12-12 NOTE — OP NOTE
[facility] [place]  Operative [facility]    Date: 12/12/2018    Pre-Op Diagnosis: interstitial cystitis    Post-Op Diagnosis: same    Procedure(s) Performed:   1. Cystoscopy, urethral dilation from 14fr to 26fr  2. hydrodistension  3. Bladder biopsy and fulguration    Specimen(s): bladder glomerulation, posterior wall- evaluate for histamines    Staff Surgeon:Luna Slater      Anesthesia: General endotracheal anesthesia    Indications: Rosalina Jacobo is a 43 y.o. female with interstitial cystitis presenting for hydrodistension.      Findings: Her urethra was not able to be easily passed with a 22fr cystoscope. I dilated this from 14 fr to 26fr without much resistance. She had a normal appearing bladder without tumors. Initial hydro-distension revealed a 1400cc capacity. Repeat was 1400cc. After each hydro-distension she had terminal hematuria and glomerulations throughout, no ulcers were seen. One of the glomerulations was biopsied and fulgurated.     Estimated Blood Loss: min    Drains: none    Procedure in detail: After risks, benefits and possible complications of hydro distention were discussed with the patient informed consent was obtained. All questions were answered in the pre-operative area.  The patient was transferred to the cystoscopy suite and placed in the supine position.  SCDs were applied and working.  General LMA anesthesia was administered.  After adequate anesthesia the patient was placed in the dorsal lithotomy position and prepped and draped in the usual sterile fashion. Time out was preformed and harjeet-procedural antibiotics (cipro and ancef - with h/o GBS, vaginal contaminant but precautionary) were confirmed.     Her urethra was not able to be easily passed with a 22fr cystoscope. I dilated this from 14 fr to 26fr without much resistance using female urethral sounds.     A rigid cystoscope in a 22 Fr sheath was introduced into the bladder per urethra. This passed easily.   The entire urethra was visualized which showed no masses or strictures.  The right and left ureteral orifices were identified in the normal anatomic position and were seen effluxing clear urine.  Formal cystoscopy was performed with both 30 and 70 degree lenses, which revealed no masses or lesions suspicious for malignancy, no trabeculations, no bladder stones and no bladder diverticuli.     The bladder was then filled with sterile saline until equilibrium was reached at 70 cm of water. The capacity of the bladder was 1400. This was repeated and the second bladder capacity was 1400. There was terminal hematuria seen. There was not ulcerations seen. There was glomerulizations seen.    A biopsy was performed from the posterior bladder wall of a glomerulation using biopsy forceps. A bugbee was used to cauterize the tissue.     The bladder was then emptied and 2%urojet was placed into the urethra and bladder.     The patient was removed from lithotomy and transferred to recovery in stable condition.    Disposition:    1. F/u in 1 month with me  2. Will find out about valium vaginal suppositories from [facility] for pelvic floor pt  3.  norco 5, disp 8 for pain. No antibiotics needed.       Luna Slater MD

## 2018-12-12 NOTE — PLAN OF CARE
Voided 200 cc per bedpan on phase II arrival.  Denies pain.  Tolerated po fluids.  Will discharge once discharge teaching completed if continues to meet other discharge criteria.  BP 80's/50's which is her baseline from this morning.  Vs stable.

## 2018-12-12 NOTE — PROGRESS NOTES
Pt states that she is unable to remove nose ring and nipple rings.  Risks explained to patient and she states that she accepts that risk

## 2018-12-12 NOTE — DISCHARGE SUMMARY
[facility]  Urology  Discharge Note - Short Stay      Patient Name: Rosalina Jacobo  MRN: [id number]  Discharge Date and Time:  12/12/2018 11:39 AM  Attending Physician: Luna Slater,*   Discharging Provider: Luna Slater MD  Primary Care Physician: Alberto Fairchild MD    Final Active Diagnoses:    Diagnosis Date Noted POA    Dyspareunia, female [N94.10] 11/15/2018 Yes      Problems Resolved During this Admission:       Final Diagnoses: Same as principal problem.    Hospital Course: Patient was admitted for an outpatient procedure and tolerated the procedure well with no complications.*    Procedure(s) (LRB):  CYSTOSCOPY, WITH BLADDER HYDRODISTENSION (N/A)  DILATION, URETHRA (N/A)  BIOPSY, BLADDER     Indwelling Lines/Drains at time of discharge:   Lines/Drains/Airways          None          Discharged Condition: good    Disposition: home    Follow Up:      Patient Instructions:   No discharge procedures on file.    Medications:  Reconciled Home Medications:      Medication List      START taking these medications    HYDROcodone-acetaminophen 5-325 mg per tablet  Commonly known as:  NORCO  Take 1 tablet by mouth every 6 (six) hours as needed for Pain.        CONTINUE taking these medications    albuterol sulfate 90 mcg/actuation Aepb  Commonly known as:  PROAIR RESPICLICK  albuterol sulfate 90 mcg/actuation breath activated powder inhaler   Inhale 2 puffs every 4 hours by inhalation route.     dextroamphetamine-amphetamine 15 mg tablet  Commonly known as:  ADDERALL  Take 15 mg by mouth 3 (three) times daily.     diazePAM 10 MG Tab  Commonly known as:  VALIUM  Take 1 mg by mouth 3 (three) times daily.     EPINEPHrine 0.3 mg/0.3 mL Atin  Commonly known as:  EPIPEN  USE AS DIRECTED ONCE FOR ALLERGIC REACTION INJECTION FOR 30 DAYS     haloperidol 10 MG tablet  Commonly known as:  HALDOL  TK 1 T PO BID     hydrOXYzine 100 MG capsule  Commonly known as:  VISTARIL  Take 100 mg by  mouth 2 (two) times daily.     hyoscyamine 0.375 mg Tb12  Commonly known as:  LEVBID  Take 1 tablet (0.375 mg total) by mouth every 12 (twelve) hours.     mirtazapine 45 MG tablet  Commonly known as:  REMERON     montelukast 10 mg tablet  Commonly known as:  SINGULAIR  Take 1 tablet (10 mg total) by mouth every evening.     prochlorperazine 10 MG tablet  Commonly known as:  COMPAZINE  Take 10 mg by mouth 3 (three) times daily as needed.     rizatriptan 10 MG disintegrating tablet  Commonly known as:  MAXALT-MLT  Take 10 mg by mouth as needed. May repeat in 2 hours if needed     VRAYLAR 3 mg Cap  Generic drug:  cariprazine  TK 1 C PO QD            Discharge Procedure Orders (must include Diet, Follow-up, Activity):  No discharge procedures on file.         Luna Slater MD  Urology  [facility]

## 2018-12-12 NOTE — TELEPHONE ENCOUNTER
Please put a prescription for this on my desk, do they have the dosing? See if it is on a form and I will write for 12 and have you fax in tomorrow.     As soon as it is faxed let pt know

## 2018-12-12 NOTE — TRANSFER OF CARE
"Anesthesia Transfer of Care Note    Patient: Rosalina Jacobo    Procedure(s) Performed: Procedure(s) (LRB):  CYSTOSCOPY, WITH BLADDER HYDRODISTENSION (N/A)  DILATION, URETHRA (N/A)  BIOPSY, BLADDER    Patient location: PACU    Anesthesia Type: general    Transport from OR: Transported from OR on 2-3 L/min O2 by NC with adequate spontaneous ventilation    Post pain: adequate analgesia    Post assessment: no apparent anesthetic complications and tolerated procedure well    Post vital signs: stable    Level of consciousness: awake    Nausea/Vomiting: no nausea/vomiting    Complications: none    Transfer of care protocol was followed      Last vitals:   Visit Vitals  BP (!) 87/63 (BP Location: Left arm, Patient Position: Lying)   Pulse 77   Temp 36.5 °C (97.7 °F) (Skin)   Resp 18   Ht 5' 6" (1.676 m)   Wt 66.7 kg (147 lb)   SpO2 97%   Breastfeeding? No   BMI 23.73 kg/m²     "

## 2018-12-12 NOTE — TELEPHONE ENCOUNTER
Spoke with [facility] pharmacy they do make these suppositories and last patient received 12 for 35 dollars

## 2018-12-12 NOTE — ANESTHESIA PREPROCEDURE EVALUATION
12/12/2018  Rosalina Jacobo is a 43 y.o., female.    Anesthesia Evaluation    I have reviewed the Patient Summary Reports.    I have reviewed the Nursing Notes.   I have reviewed the Medications.     Review of Systems  Anesthesia Hx:  No problems with previous Anesthesia    Social:  Smoker    Hematology/Oncology:         -- Anemia:   Cardiovascular:  Cardiovascular Normal     Pulmonary:   Asthma mild and asymptomatic    Renal/:  Renal/ Normal     Musculoskeletal:   Arthritis     Neurological:   CVA Headaches    Endocrine:  Endocrine Normal    Psych:   Psychiatric History (PTSD, Bipolar, ADHD) anxiety          Physical Exam  General:  Well nourished    Airway/Jaw/Neck:  Airway Findings: Mouth Opening: Normal Tongue: Normal  General Airway Assessment: Adult  Oropharynx Findings:  Mallampati: II  Jaw/Neck Findings:  Neck ROM: Normal ROM     Eyes/Ears/Nose:  Eyes/Ears/Nose Findings:    Dental:  Dental Findings:   Chest/Lungs:  Chest/Lungs Findings: Normal Respiratory Rate     Heart/Vascular:  Heart Findings: Rate: Normal  Rhythm: Regular Rhythm        Mental Status:  Mental Status Findings:  Cooperative, Alert and Oriented         Anesthesia Plan  Type of Anesthesia, risks & benefits discussed:  Anesthesia Type:  general  Patient's Preference:   Intra-op Monitoring Plan: standard ASA monitors  Intra-op Monitoring Plan Comments:   Post Op Pain Control Plan: multimodal analgesia  Post Op Pain Control Plan Comments:   Induction:   IV  Beta Blocker:  Patient is not currently on a Beta-Blocker (No further documentation required).       Informed Consent: Patient understands risks and agrees with Anesthesia plan.  Questions answered. Anesthesia consent signed with patient.  ASA Score: 2     Day of Surgery Review of History & Physical:  There are no significant changes.   H&P completed by Anesthesiologist.        Ready For Surgery From Anesthesia Perspective.

## 2018-12-12 NOTE — TELEPHONE ENCOUNTER
----- Message from Luna Slater MD sent at 12/12/2018 10:15 AM CST -----  valium/lidocaine/baclofen vaginal suppositories    Can you call [facility] and see how much this costs, do they already make one?

## 2018-12-14 ENCOUNTER — TELEPHONE (OUTPATIENT)
Dept: UROLOGY | Facility: CLINIC | Age: 43
End: 2018-12-14

## 2018-12-14 NOTE — TELEPHONE ENCOUNTER
----- Message from Danay Nelson sent at 12/14/2018 11:17 AM CST -----  Contact: Nelson chapman [facility]  Type:  Pharmacy Calling to Clarify an RX    Name of Caller:  Nelson chapman [facility]  Pharmacy Name:  [facility]  Prescription Name:  valium/lidocaine/baclofen vaginal suppositories  What do they need to clarify?:  Correct dosage  Best Call Back Number:  [phone]  Additional Information:  Specify dosage 10/6/20 mg?

## 2018-12-14 NOTE — TELEPHONE ENCOUNTER
Spoke with Janet at [facility] pharmacy prescription clarified. Verbally voiced understanding.

## 2019-01-14 ENCOUNTER — TELEPHONE (OUTPATIENT)
Dept: UROLOGY | Facility: CLINIC | Age: 44
End: 2019-01-14

## 2019-09-24 ENCOUNTER — OFFICE VISIT (OUTPATIENT)
Dept: UROGYNECOLOGY | Facility: CLINIC | Age: 44
End: 2019-09-24
Payer: MEDICARE

## 2019-09-24 VITALS
HEIGHT: 66 IN | WEIGHT: 150.13 LBS | SYSTOLIC BLOOD PRESSURE: 103 MMHG | BODY MASS INDEX: 24.13 KG/M2 | DIASTOLIC BLOOD PRESSURE: 77 MMHG | HEART RATE: 101 BPM

## 2019-09-24 DIAGNOSIS — N30.10 IC (INTERSTITIAL CYSTITIS): ICD-10-CM

## 2019-09-24 DIAGNOSIS — R35.0 URINARY FREQUENCY: Primary | ICD-10-CM

## 2019-09-24 DIAGNOSIS — N94.19 DYSPAREUNIA DUE TO MEDICAL CONDITION IN FEMALE: ICD-10-CM

## 2019-09-24 LAB
BILIRUB SERPL-MCNC: ABNORMAL MG/DL
BLOOD URINE, POC: ABNORMAL
COLOR, POC UA: YELLOW
GLUCOSE UR QL STRIP: ABNORMAL
KETONES UR QL STRIP: ABNORMAL
LEUKOCYTE ESTERASE URINE, POC: ABNORMAL
NITRITE, POC UA: ABNORMAL
PH, POC UA: 5
PROTEIN, POC: ABNORMAL
SPECIFIC GRAVITY, POC UA: 1025
UROBILINOGEN, POC UA: ABNORMAL

## 2019-09-24 PROCEDURE — 81002 URINALYSIS NONAUTO W/O SCOPE: CPT | Mod: PBBFAC,PO | Performed by: NURSE PRACTITIONER

## 2019-09-24 PROCEDURE — 99214 PR OFFICE/OUTPT VISIT, EST, LEVL IV, 30-39 MIN: ICD-10-PCS | Mod: S$PBB,25,, | Performed by: NURSE PRACTITIONER

## 2019-09-24 PROCEDURE — 99999 PR PBB SHADOW E&M-EST. PATIENT-LVL III: ICD-10-PCS | Mod: PBBFAC,,, | Performed by: NURSE PRACTITIONER

## 2019-09-24 PROCEDURE — 99999 PR PBB SHADOW E&M-EST. PATIENT-LVL III: CPT | Mod: PBBFAC,,, | Performed by: NURSE PRACTITIONER

## 2019-09-24 PROCEDURE — 51700 PR IRRIGATION, BLADDER: ICD-10-PCS | Mod: S$PBB,,, | Performed by: NURSE PRACTITIONER

## 2019-09-24 PROCEDURE — 51700 IRRIGATION OF BLADDER: CPT | Mod: S$PBB,,, | Performed by: NURSE PRACTITIONER

## 2019-09-24 PROCEDURE — 99214 OFFICE O/P EST MOD 30 MIN: CPT | Mod: S$PBB,25,, | Performed by: NURSE PRACTITIONER

## 2019-09-24 PROCEDURE — 99213 OFFICE O/P EST LOW 20 MIN: CPT | Mod: PBBFAC,PO | Performed by: NURSE PRACTITIONER

## 2019-09-24 PROCEDURE — 51700 IRRIGATION OF BLADDER: CPT | Mod: PBBFAC,PO | Performed by: NURSE PRACTITIONER

## 2019-09-24 RX ORDER — LIDOCAINE HYDROCHLORIDE 20 MG/ML
20 INJECTION, SOLUTION INFILTRATION; PERINEURAL
Status: COMPLETED | OUTPATIENT
Start: 2019-09-24 | End: 2019-09-24

## 2019-09-24 RX ORDER — DIAPER,BRIEF,INFANT-TODD,DISP
EACH MISCELLANEOUS
Refills: 0 | COMMUNITY
Start: 2019-09-06 | End: 2020-04-08

## 2019-09-24 RX ORDER — HYDROCODONE BITARTRATE AND ACETAMINOPHEN 7.5; 325 MG/1; MG/1
TABLET ORAL
Refills: 0 | COMMUNITY
Start: 2019-08-23 | End: 2020-04-08

## 2019-09-24 RX ADMIN — LIDOCAINE HYDROCHLORIDE 20 ML: 20 INJECTION, SOLUTION INFILTRATION; PERINEURAL at 03:09

## 2019-09-24 NOTE — PROGRESS NOTES
Subjective:       Patient ID: Rosalina Jacobo is a 44 y.o. female.    Chief Complaint: bladder problems      Rosalina Jacobo is a 44 y.o. female who presents today for bladder problems.  She had cystoscopy with hydrodistension with Dr. Slater on 12/12/2018.  She felt she was doing very well after the hydrodistension.  However, a few weeks ago, her symptoms started to gradually return.  She is starting to notice more urinary frequency and some feeling of PVF.  She is having some lower abd. Cramping sensation.  She is having some dyspareunia.  She is up to date on her WWE and has no vaginal complaints/concerns.  When Dr. Slater reviewed her cystoscopy with the pt, the pt was aware that she might have to have a cystoscopy about every 6 months or so.  She already tries to watch what she eats but does like to eat some spicy foods at times.  She is amenable to trying some instillations to see if this helps her pain, but would like to start the process for the repeat cysto with hydro so that she is not waiting too long before having this done.    Review of Systems   Constitutional: Negative for activity change, fever and unexpected weight change.   HENT: Negative for hearing loss.    Eyes: Negative for visual disturbance.   Respiratory: Negative for shortness of breath and wheezing.    Cardiovascular: Negative for chest pain, palpitations and leg swelling.   Gastrointestinal: Positive for abdominal pain. Negative for constipation and diarrhea.   Genitourinary: Positive for dyspareunia and frequency. Negative for dysuria, urgency, vaginal bleeding and vaginal discharge.   Musculoskeletal: Negative for gait problem and neck pain.   Skin: Negative for rash and wound.   Allergic/Immunologic: Negative for immunocompromised state.   Neurological: Negative for tremors, speech difficulty and weakness.   Hematological: Does not bruise/bleed easily.   Psychiatric/Behavioral: Negative for agitation and confusion.        Objective:      Physical Exam   Constitutional: She is oriented to person, place, and time. She appears well-developed and well-nourished. No distress.   HENT:   Head: Normocephalic and atraumatic.   Neck: Neck supple. No thyromegaly present.   Pulmonary/Chest: Effort normal. No respiratory distress.   Abdominal: Soft. There is tenderness in the suprapubic area. No hernia.   Musculoskeletal: Normal range of motion.   Neurological: She is alert and oriented to person, place, and time.   Skin: Skin is warm and dry. No rash noted.   Psychiatric: She has a normal mood and affect. Her behavior is normal.     Pelvic Exam:  V: No lesions. No palpable nodes.   Meatus:No caruncle or stenosis  Urethra: Non tender. No suburethral masses.  BL:  tender  RV: No hemorrhoids.      Assessment:       1. Urinary frequency    2. IC (interstitial cystitis)    3. Dyspareunia due to medical condition in female        Procedure note- After betadine irrigation of the urethra, lidocaine instilled via urojet.   A #14 Algerian red rubber tip catheter was inserted into the bladder.  80 mls of residual urine noted.  2 capsules of Elmiron, which the pt provided, and 20 mls of 2% lidocaine instilled into bladder.  Pt instructed to hold mixture for at least 1 hour prior to voiding.  Pt verbalized understanding.      Plan:       Urinary frequency- monitor  -     POCT URINE DIPSTICK WITHOUT MICROSCOPE    IC (interstitial cystitis)- instillation as noted above.  We will get an appt with Dr. Mcgovern to discuss cysto with hydro.  -     lidocaine HCL 20 mg/ml (2%) injection 20 mL    Dyspareunia due to medical condition in female- trial of instillation    RTC 1 Week

## 2019-10-02 ENCOUNTER — PROCEDURE VISIT (OUTPATIENT)
Dept: UROGYNECOLOGY | Facility: CLINIC | Age: 44
End: 2019-10-02
Payer: MEDICARE

## 2019-10-02 VITALS
WEIGHT: 149.69 LBS | DIASTOLIC BLOOD PRESSURE: 83 MMHG | HEIGHT: 66 IN | HEART RATE: 107 BPM | BODY MASS INDEX: 24.06 KG/M2 | SYSTOLIC BLOOD PRESSURE: 123 MMHG

## 2019-10-02 DIAGNOSIS — R35.0 URINARY FREQUENCY: Primary | ICD-10-CM

## 2019-10-02 DIAGNOSIS — N30.10 IC (INTERSTITIAL CYSTITIS): ICD-10-CM

## 2019-10-02 DIAGNOSIS — N94.19 DYSPAREUNIA DUE TO MEDICAL CONDITION IN FEMALE: ICD-10-CM

## 2019-10-02 DIAGNOSIS — N30.90 CYSTITIS: ICD-10-CM

## 2019-10-02 LAB
BILIRUB SERPL-MCNC: ABNORMAL MG/DL
BLOOD URINE, POC: 250
COLOR, POC UA: YELLOW
GLUCOSE UR QL STRIP: ABNORMAL
KETONES UR QL STRIP: ABNORMAL
LEUKOCYTE ESTERASE URINE, POC: ABNORMAL
NITRITE, POC UA: ABNORMAL
PH, POC UA: 5
PROTEIN, POC: ABNORMAL
SPECIFIC GRAVITY, POC UA: 1030
UROBILINOGEN, POC UA: ABNORMAL

## 2019-10-02 PROCEDURE — 51700 IRRIGATION OF BLADDER: CPT | Mod: S$PBB,,, | Performed by: NURSE PRACTITIONER

## 2019-10-02 PROCEDURE — 81002 URINALYSIS NONAUTO W/O SCOPE: CPT | Mod: PBBFAC,PO | Performed by: NURSE PRACTITIONER

## 2019-10-02 PROCEDURE — 99214 PR OFFICE/OUTPT VISIT, EST, LEVL IV, 30-39 MIN: ICD-10-PCS | Mod: S$PBB,25,, | Performed by: NURSE PRACTITIONER

## 2019-10-02 PROCEDURE — 51700 IRRIGATION OF BLADDER: CPT | Mod: PBBFAC,PO | Performed by: NURSE PRACTITIONER

## 2019-10-02 PROCEDURE — 87088 URINE BACTERIA CULTURE: CPT

## 2019-10-02 PROCEDURE — 51700 PR IRRIGATION, BLADDER: ICD-10-PCS | Mod: S$PBB,,, | Performed by: NURSE PRACTITIONER

## 2019-10-02 PROCEDURE — 87086 URINE CULTURE/COLONY COUNT: CPT

## 2019-10-02 PROCEDURE — 87077 CULTURE AEROBIC IDENTIFY: CPT

## 2019-10-02 PROCEDURE — 99214 OFFICE O/P EST MOD 30 MIN: CPT | Mod: S$PBB,25,, | Performed by: NURSE PRACTITIONER

## 2019-10-02 PROCEDURE — 87186 SC STD MICRODIL/AGAR DIL: CPT

## 2019-10-02 RX ORDER — LIDOCAINE HYDROCHLORIDE 20 MG/ML
20 INJECTION, SOLUTION INFILTRATION; PERINEURAL
Status: COMPLETED | OUTPATIENT
Start: 2019-10-02 | End: 2019-10-02

## 2019-10-02 RX ORDER — GENTAMICIN SULFATE 40 MG/ML
80 INJECTION, SOLUTION INTRAMUSCULAR; INTRAVENOUS ONCE
Status: COMPLETED | OUTPATIENT
Start: 2019-10-02 | End: 2019-10-02

## 2019-10-02 RX ADMIN — GENTAMICIN SULFATE 80 MG: 40 INJECTION, SOLUTION INTRAMUSCULAR; INTRAVENOUS at 08:10

## 2019-10-02 RX ADMIN — LIDOCAINE HYDROCHLORIDE 20 ML: 20 INJECTION, SOLUTION INFILTRATION; PERINEURAL at 08:10

## 2019-10-02 NOTE — PROCEDURES
Subjective:       Patient ID: Rosalina Jacobo is a 44 y.o. female.    Chief Complaint: instillation      Rosalina Jacobo is a 44 y.o. female who presents today for instillation.  She was seen in our department last week.  She has done well after a cystoscopy with hydrodistension with Dr. Slater on 12/12/18.  Her symptoms have been gradually returning.  She is trying a series of instillations until she can be seen to discuss repeating the cysto with hydro.  She felt that the instillation last week did help but only lasted for about 2 days.  Her UA today is suspicious for UTI, but the pt is amenable to waiting to treat with oral antibiotics until we get the urine culture results.  She denies any other acute complaints/concerns and is ready to proceed with the instillation.    Review of Systems   Constitutional: Negative for activity change, fever and unexpected weight change.   HENT: Negative for hearing loss.    Eyes: Negative for visual disturbance.   Respiratory: Negative for shortness of breath and wheezing.    Cardiovascular: Negative for chest pain, palpitations and leg swelling.   Gastrointestinal: Negative for abdominal pain, constipation and diarrhea.   Genitourinary: Positive for dyspareunia, dysuria and frequency. Negative for urgency, vaginal bleeding and vaginal discharge.   Musculoskeletal: Negative for gait problem and neck pain.   Skin: Negative for rash and wound.   Allergic/Immunologic: Negative for immunocompromised state.   Neurological: Negative for tremors, speech difficulty and weakness.   Hematological: Does not bruise/bleed easily.   Psychiatric/Behavioral: Negative for agitation and confusion.       Objective:      Physical Exam   Constitutional: She is oriented to person, place, and time. She appears well-developed and well-nourished. No distress.   HENT:   Head: Normocephalic and atraumatic.   Neck: Neck supple. No thyromegaly present.   Pulmonary/Chest: Effort normal. No  respiratory distress.   Abdominal: Soft. There is no tenderness. No hernia.   Musculoskeletal: Normal range of motion.   Neurological: She is alert and oriented to person, place, and time.   Skin: Skin is warm and dry. No rash noted.   Psychiatric: She has a normal mood and affect. Her behavior is normal.     Pelvic Exam:  V: No lesions. No palpable nodes.   Meatus:No caruncle or stenosis  Urethra: Non tender. No suburethral masses.  BL:  Mildly tender  RV: No hemorrhoids.      Assessment:       1. Urinary frequency    2. IC (interstitial cystitis)    3. Cystitis    4. Dyspareunia due to medical condition in female        Procedure note- After betadine irrigation of the urethra, lidocaine instilled via urojet.   A #14 Bulgarian red rubber tip catheter was inserted into the bladder.  20 mls of residual urine noted. 80 mg of gentamicin and  2 capsules of Elmiron, which the pt provided, and 20 mls of 2% lidocaine instilled into bladder.  Pt instructed to hold mixture for at least 1 hour prior to voiding.  Pt verbalized understanding.      Plan:       Urinary frequency- urine culture as noted below  -     POCT URINE DIPSTICK WITHOUT MICROSCOPE  -     Urine culture    IC (interstitial cystitis)- instillation as noted    Cystitis- instillation as noted  -     lidocaine HCL 20 mg/ml (2%) injection 20 mL  -     gentamicin injection 80 mg    Dyspareunia due to medical condition in-  Female- monitor    RTC 1 weeks with Dr. Tolliver to discuss cysto with hydro.

## 2019-10-05 LAB — BACTERIA UR CULT: ABNORMAL

## 2019-10-08 RX ORDER — SULFAMETHOXAZOLE AND TRIMETHOPRIM 800; 160 MG/1; MG/1
1 TABLET ORAL 2 TIMES DAILY
Qty: 10 TABLET | Refills: 0 | Status: SHIPPED | OUTPATIENT
Start: 2019-10-08 | End: 2019-10-13

## 2019-10-16 ENCOUNTER — OFFICE VISIT (OUTPATIENT)
Dept: UROGYNECOLOGY | Facility: CLINIC | Age: 44
End: 2019-10-16
Payer: MEDICARE

## 2019-10-16 VITALS
SYSTOLIC BLOOD PRESSURE: 117 MMHG | HEART RATE: 93 BPM | WEIGHT: 150.13 LBS | DIASTOLIC BLOOD PRESSURE: 74 MMHG | BODY MASS INDEX: 24.13 KG/M2 | HEIGHT: 66 IN

## 2019-10-16 DIAGNOSIS — N30.10 IC (INTERSTITIAL CYSTITIS): ICD-10-CM

## 2019-10-16 DIAGNOSIS — R35.0 URINARY FREQUENCY: Primary | ICD-10-CM

## 2019-10-16 DIAGNOSIS — R10.2 PELVIC PAIN: ICD-10-CM

## 2019-10-16 DIAGNOSIS — N30.30 TRIGONITIS WITHOUT HEMATURIA: ICD-10-CM

## 2019-10-16 LAB
BILIRUB SERPL-MCNC: ABNORMAL MG/DL
BLOOD URINE, POC: 50
COLOR, POC UA: YELLOW
GLUCOSE UR QL STRIP: ABNORMAL
KETONES UR QL STRIP: ABNORMAL
LEUKOCYTE ESTERASE URINE, POC: ABNORMAL
NITRITE, POC UA: ABNORMAL
PH, POC UA: 5
PROTEIN, POC: ABNORMAL
SPECIFIC GRAVITY, POC UA: 1.02
UROBILINOGEN, POC UA: ABNORMAL

## 2019-10-16 PROCEDURE — 99214 OFFICE O/P EST MOD 30 MIN: CPT | Mod: PBBFAC,PO | Performed by: OBSTETRICS & GYNECOLOGY

## 2019-10-16 PROCEDURE — 81002 URINALYSIS NONAUTO W/O SCOPE: CPT | Mod: PBBFAC,PO | Performed by: OBSTETRICS & GYNECOLOGY

## 2019-10-16 PROCEDURE — 99999 PR PBB SHADOW E&M-EST. PATIENT-LVL IV: ICD-10-PCS | Mod: PBBFAC,,, | Performed by: OBSTETRICS & GYNECOLOGY

## 2019-10-16 PROCEDURE — 99215 OFFICE O/P EST HI 40 MIN: CPT | Mod: S$PBB,,, | Performed by: OBSTETRICS & GYNECOLOGY

## 2019-10-16 PROCEDURE — 99999 PR PBB SHADOW E&M-EST. PATIENT-LVL IV: CPT | Mod: PBBFAC,,, | Performed by: OBSTETRICS & GYNECOLOGY

## 2019-10-16 PROCEDURE — 99215 PR OFFICE/OUTPT VISIT, EST, LEVL V, 40-54 MIN: ICD-10-PCS | Mod: S$PBB,,, | Performed by: OBSTETRICS & GYNECOLOGY

## 2019-10-16 RX ORDER — HYDROXYZINE PAMOATE 50 MG/1
CAPSULE ORAL
COMMUNITY
Start: 2019-10-14 | End: 2020-04-08

## 2019-10-16 RX ORDER — DULOXETIN HYDROCHLORIDE 20 MG/1
20 CAPSULE, DELAYED RELEASE ORAL DAILY
Status: ON HOLD | COMMUNITY
Start: 2019-10-14 | End: 2021-02-22 | Stop reason: HOSPADM

## 2019-10-16 RX ORDER — HYDROXYZINE HYDROCHLORIDE 25 MG/1
25 TABLET, FILM COATED ORAL NIGHTLY
Qty: 30 TABLET | Refills: 3 | Status: SHIPPED | OUTPATIENT
Start: 2019-10-16 | End: 2019-11-15

## 2019-10-16 NOTE — PATIENT INSTRUCTIONS
next list.  The Most Bothersome Foods* The Least Bothersome Foods*   Coffee - Regular & Decaf  Tea - caffeinated  Carbonated beverages - cola, non-joy, diet & caffeine-free  Alcohols - Beer, Red Wine, White Wine, Champagne  Fruits - Grapefruit, Lemon, Orange, Pineapple  Fruit Juices - Cranberry, Grapefruit, Orange, Pineapple  Vegetables - Tomato & Tomato Products  Flavor Enhancers - Hot peppers, Spicy foods, Chili, Horseradish, Vinegar, Monosodium glutamate (MSG)  Artificial Sweeteners - NutraSweet, Sweet N Low, Equal (sweetener), Saccharin  Ethnic foods - Mexican, Jimbo, Citizen of [place] food Water  Milk - low-fat & whole  Fruits - Bananas, Blueberries, Honeydew melon, Pears, Raisins, Watermelon  Vegetables - Broccoli, Vina Sprouts, Cabbage, Carrots, Cauliflower, Celery, Cucumber, Mushrooms, Peas, Radishes, Squash, Zucchini, White potatoes, Sweet potatoes & yams  Poultry - Chicken, Eggs, Turkey,  Meat - Beef, Pork, Lamb  Seafood - Shrimp, Tuna fish, [place]e  Grains - Oat, Rice  Snacks - Pretzels, Popcorn   *Sea GALDAMEZ et al. Diet and its role in interstitial cystitis/bladder pain syndrome (IC/BPS) and comorbid conditions. [employer]. [employer] 2012 Jan 11.

## 2019-10-16 NOTE — PROGRESS NOTES
Subjective:      Patient ID: Rosalina Jacobo is a 44 y.o. female.    Chief Complaint:  discuss cysto      History of Present Illness      44-year-old  1 para 100 1 times c/s    Patient with history of hysterectomy unclear etiology.  Patient with the bladder suspension.  Patient under treatment long term for IC.  The patient type colleague in the Urology has carried out cystoscopy with hydrodistention and about bladder biopsy pathology final reading shows chronic inflammation.  Patient has been undergoing a series of bladder instillations with provider within the urogynecology gynecology division that once again no benefit.  Patient is active smoker.  Patient with pain during intercourse.             Past Medical History:   Diagnosis Date    ADHD (attention deficit hyperactivity disorder)     Anemia     Anxiety     Bipolar 1 disorder     Bipolar disorder     CVA (cerebral vascular accident)     Endometriosis     Headache     Heartburn     Infertility, female     Insomnia     Migraine headache     PTSD (post-traumatic stress disorder)     Trauma        Past Surgical History:   Procedure Laterality Date    APPENDECTOMY      BLADDER SUSPENSION      CERVICAL FUSION       SECTION      CHOLECYSTECTOMY      CYSTOSCOPY WITH BIOPSY OF BLADDER N/A 2018    Procedure: CYSTOSCOPY, WITH BLADDER BIOPSY, WITH FULGURATION IF INDICATED;  Surgeon: Luna Slater MD;  Location: [facility] OR;  Service: Urology;  Laterality: N/A;    DILATION OF URETHRA N/A 2018    Procedure: DILATION, URETHRA;  Surgeon: Luna Slater MD;  Location: [facility] OR;  Service: Urology;  Laterality: N/A;    HYSTERECTOMY      KNEE SURGERY      TIBIA FRACTURE SURGERY      TOE SURGERY Left        GYN & OB History  No LMP recorded. Patient has had a hysterectomy.   Date of Last Pap: 10/26/2016    OB History    Para Term  AB Living   1 1           SAB TAB Ectopic Multiple Live Births  "                 # Outcome Date GA Lbr Jesus/2nd Weight Sex Delivery Anes PTL Lv   1 Para                Health Maintenance       Date Due Completion Date    Pneumococcal Vaccine (Medium Risk) (1 of 1 - PPSV23) 03/05/1995 ---    Mammogram 03/05/2016 ---    TETANUS VACCINE 09/18/2025 9/18/2015          Family History   Problem Relation Age of Onset    Hypertension Father     Hypertension Mother     Stroke Mother        Social History     Socioeconomic History    Marital status:      Spouse name: Not on file    Number of children: Not on file    Years of education: Not on file    Highest education level: Not on file   Occupational History    Not on file   Social Needs    Financial resource strain: Not on file    Food insecurity:     Worry: Not on file     Inability: Not on file    Transportation needs:     Medical: Not on file     Non-medical: Not on file   Tobacco Use    Smoking status: Current Every Day Smoker     Packs/day: 1.00     Types: Cigarettes, Vaping with nicotine    Smokeless tobacco: Never Used   Substance and Sexual Activity    Alcohol use: No     Frequency: Never    Drug use: Yes     Types: Marijuana     Comment: last dose last week.    Sexual activity: Yes     Partners: Male     Birth control/protection: See Surgical Hx   Lifestyle    Physical activity:     Days per week: Not on file     Minutes per session: Not on file    Stress: Not on file   Relationships    Social connections:     Talks on phone: Not on file     Gets together: Not on file     Attends Shinto service: Not on file     Active member of club or organization: Not on file     Attends meetings of clubs or organizations: Not on file     Relationship status: Not on file   Other Topics Concern    Not on file   Social History Narrative    Not on file       Review of Systems  Review of Systems   Genitourinary: Positive for dyspareunia and pelvic pain.          Objective:   /74   Pulse 93   Ht 5' 6" (1.676 m) "   Wt 68.1 kg (150 lb 2.1 oz)   BMI 24.23 kg/m²     Physical Exam   Genitourinary: Pelvic exam was performed with patient supine. Skenes normal and bartholins normal. Right labia normal and left labia normal. Urethra exhibits no urethral caruncle, no urethral diverticulum, no urethral mass and no hypermobility. No rectocele, cystocele, unspecified prolapse of vaginal walls or atrophy in the vagina. Cervix exhibits absence. Uterus is absent.   Empty cough stress test: Negative.  Kegel: 2/5    POP-Q deferred.     Substantially tender trigone.  Very tender trigone as stated.     Assessment:     1. Urinary frequency    2. Pelvic pain    3. IC (interstitial cystitis)    4. Trigonitis without hematuria            Plan:     1. Urinary frequency    2. Pelvic pain    3. IC (interstitial cystitis)    4. Trigonitis without hematuria      After examination I then had patient dressed so that we can talk in face-to-face manner.  I believe at this point smoking is a keeping her from having further efficacy with treatment.  Patient with substantially tender trigone.  For this reason I am going to utilize  Fossa basal 1 tablet 4 times a day.  This along with hydroxyzine at night should pat provide the level of relief the trigone this in combination with a beta 3 agonist for its pre should mobile with assisting patient discomfort.  I would like patient to continue with these medicines and then consider continued installation there is no efficacy.  The period of 4-6 weeks if there is no improvement we will consider another cystoscopy with hydrodistention.  Total time this consultation was 40 min greater than 50% of the time 25 min in direct discussion answering questions this emanating information        There are no Patient Instructions on file for this visit.

## 2019-11-20 ENCOUNTER — OFFICE VISIT (OUTPATIENT)
Dept: UROGYNECOLOGY | Facility: CLINIC | Age: 44
End: 2019-11-20
Payer: MEDICARE

## 2019-11-20 VITALS
DIASTOLIC BLOOD PRESSURE: 88 MMHG | HEART RATE: 79 BPM | SYSTOLIC BLOOD PRESSURE: 123 MMHG | RESPIRATION RATE: 18 BRPM | HEIGHT: 66 IN | BODY MASS INDEX: 24.45 KG/M2 | WEIGHT: 152.13 LBS

## 2019-11-20 DIAGNOSIS — R35.0 URINARY FREQUENCY: Primary | ICD-10-CM

## 2019-11-20 DIAGNOSIS — N30.10 IC (INTERSTITIAL CYSTITIS): ICD-10-CM

## 2019-11-20 LAB
BILIRUB SERPL-MCNC: ABNORMAL MG/DL
BLOOD URINE, POC: ABNORMAL
COLOR, POC UA: YELLOW
GLUCOSE UR QL STRIP: ABNORMAL
KETONES UR QL STRIP: ABNORMAL
LEUKOCYTE ESTERASE URINE, POC: ABNORMAL
NITRITE, POC UA: ABNORMAL
PH, POC UA: 7
PROTEIN, POC: ABNORMAL
SPECIFIC GRAVITY, POC UA: 1.01
UROBILINOGEN, POC UA: ABNORMAL

## 2019-11-20 PROCEDURE — 99999 PR PBB SHADOW E&M-EST. PATIENT-LVL V: CPT | Mod: PBBFAC,,, | Performed by: OBSTETRICS & GYNECOLOGY

## 2019-11-20 PROCEDURE — 99214 PR OFFICE/OUTPT VISIT, EST, LEVL IV, 30-39 MIN: ICD-10-PCS | Mod: S$PBB,,, | Performed by: OBSTETRICS & GYNECOLOGY

## 2019-11-20 PROCEDURE — 81002 URINALYSIS NONAUTO W/O SCOPE: CPT | Mod: PBBFAC,PO | Performed by: OBSTETRICS & GYNECOLOGY

## 2019-11-20 PROCEDURE — 99999 PR PBB SHADOW E&M-EST. PATIENT-LVL V: ICD-10-PCS | Mod: PBBFAC,,, | Performed by: OBSTETRICS & GYNECOLOGY

## 2019-11-20 PROCEDURE — 99214 OFFICE O/P EST MOD 30 MIN: CPT | Mod: S$PBB,,, | Performed by: OBSTETRICS & GYNECOLOGY

## 2019-11-20 PROCEDURE — 99215 OFFICE O/P EST HI 40 MIN: CPT | Mod: PBBFAC,PO | Performed by: OBSTETRICS & GYNECOLOGY

## 2019-11-20 NOTE — H&P (VIEW-ONLY)
Subjective:      Patient ID: Rosalina Jacobo is a 44 y.o. female.    Chief Complaint:  5 wk f/'u myrbetriq/uribel      History of Present Illness  She is on the Myrbetriq she is on hydroxyzine however she was not able to afford the Urogesic.  Smoking still continues.  Patient states no effect of the medications.  Wishing to move forward with hydro distension cystoscopy          Past Medical History:   Diagnosis Date    ADHD (attention deficit hyperactivity disorder)     Anemia     Anxiety     Bipolar 1 disorder     Bipolar disorder     CVA (cerebral vascular accident)     Endometriosis     Headache     Heartburn     Infertility, female     Insomnia     Migraine headache     PTSD (post-traumatic stress disorder)     Trauma        Past Surgical History:   Procedure Laterality Date    APPENDECTOMY      BLADDER SUSPENSION      CERVICAL FUSION       SECTION      CHOLECYSTECTOMY      CYSTOSCOPY WITH BIOPSY OF BLADDER N/A 2018    Procedure: CYSTOSCOPY, WITH BLADDER BIOPSY, WITH FULGURATION IF INDICATED;  Surgeon: Luna Slater MD;  Location: [facility] OR;  Service: Urology;  Laterality: N/A;    DILATION OF URETHRA N/A 2018    Procedure: DILATION, URETHRA;  Surgeon: Luna Slater MD;  Location: [facility] OR;  Service: Urology;  Laterality: N/A;    HYSTERECTOMY      KNEE SURGERY      TIBIA FRACTURE SURGERY      TOE SURGERY Left        GYN & OB History  No LMP recorded. Patient has had a hysterectomy.   Date of Last Pap: 10/26/2016    OB History    Para Term  AB Living   1 1           SAB TAB Ectopic Multiple Live Births                  # Outcome Date GA Lbr Jesus/2nd Weight Sex Delivery Anes PTL Lv   1 Para                Health Maintenance       Date Due Completion Date    Pneumococcal Vaccine (Medium Risk) ( of  - PPSV23) 1995 ---    Mammogram 2016 ---    TETANUS VACCINE 2025          Family History   Problem Relation  "Age of Onset    Hypertension Father     Hypertension Mother     Stroke Mother        Social History     Socioeconomic History    Marital status:      Spouse name: Not on file    Number of children: Not on file    Years of education: Not on file    Highest education level: Not on file   Occupational History    Not on file   Social Needs    Financial resource strain: Not on file    Food insecurity:     Worry: Not on file     Inability: Not on file    Transportation needs:     Medical: Not on file     Non-medical: Not on file   Tobacco Use    Smoking status: Current Every Day Smoker     Packs/day: 1.00     Types: Cigarettes, Vaping with nicotine    Smokeless tobacco: Never Used   Substance and Sexual Activity    Alcohol use: No     Frequency: Never    Drug use: Yes     Types: Marijuana     Comment: last dose last week.    Sexual activity: Yes     Partners: Male     Birth control/protection: See Surgical Hx   Lifestyle    Physical activity:     Days per week: Not on file     Minutes per session: Not on file    Stress: Not on file   Relationships    Social connections:     Talks on phone: Not on file     Gets together: Not on file     Attends [employer] service: Not on file     Active member of club or organization: Not on file     Attends meetings of clubs or organizations: Not on file     Relationship status: Not on file   Other Topics Concern    Not on file   Social History Narrative    Not on file       Review of Systems  Review of Systems   Genitourinary: Positive for frequency, pelvic pain, urgency and vaginal pain.          Objective:   /88   Pulse 79   Resp 18   Ht 5' 6" (1.676 m)   Wt 69 kg (152 lb 1.9 oz)   BMI 24.55 kg/m²     Physical Exam   Deferred at this time  Assessment:     1. Urinary frequency    2. IC (interstitial cystitis)            Plan:     1. Urinary frequency    2. IC (interstitial cystitis)      Given that were not having success and the seeing that the " patient has had tremendous efficacy from prior hydro distension done by colleague I am going to move forward and post this procedure cysto with hydrodistention as well as carry out exam under anesthesia.  Patient understanding patient many questions regarding medications I reviewed all and discussed importance I want to have patient really take the Urogesic      Patient understanding we will post this the procedure patient understanding appreciated of the time.  Total time 25 min greater than 50% of time 15 min in direct discussion with patient and reviewing medical profiles as well as discussing the efficacy of hydro distension it is interesting to note she is not getting any benefit from the bladder installations.    There are no Patient Instructions on file for this visit.

## 2019-11-20 NOTE — PROGRESS NOTES
Subjective:      Patient ID: Rosalina Jacobo is a 44 y.o. female.    Chief Complaint:  5 wk f/'u myrbetriq/uribel      History of Present Illness  She is on the Myrbetriq she is on hydroxyzine however she was not able to afford the Urogesic.  Smoking still continues.  Patient states no effect of the medications.  Wishing to move forward with hydro distension cystoscopy          Past Medical History:   Diagnosis Date    ADHD (attention deficit hyperactivity disorder)     Anemia     Anxiety     Bipolar 1 disorder     Bipolar disorder     CVA (cerebral vascular accident)     Endometriosis     Headache     Heartburn     Infertility, female     Insomnia     Migraine headache     PTSD (post-traumatic stress disorder)     Trauma        Past Surgical History:   Procedure Laterality Date    APPENDECTOMY      BLADDER SUSPENSION      CERVICAL FUSION       SECTION      CHOLECYSTECTOMY      CYSTOSCOPY WITH BIOPSY OF BLADDER N/A 2018    Procedure: CYSTOSCOPY, WITH BLADDER BIOPSY, WITH FULGURATION IF INDICATED;  Surgeon: Luna Slater MD;  Location: [facility] OR;  Service: Urology;  Laterality: N/A;    DILATION OF URETHRA N/A 2018    Procedure: DILATION, URETHRA;  Surgeon: Luna Slater MD;  Location: [facility] OR;  Service: Urology;  Laterality: N/A;    HYSTERECTOMY      KNEE SURGERY      TIBIA FRACTURE SURGERY      TOE SURGERY Left        GYN & OB History  No LMP recorded. Patient has had a hysterectomy.   Date of Last Pap: 10/26/2016    OB History    Para Term  AB Living   1 1           SAB TAB Ectopic Multiple Live Births                  # Outcome Date GA Lbr Jesus/2nd Weight Sex Delivery Anes PTL Lv   1 Para                Health Maintenance       Date Due Completion Date    Pneumococcal Vaccine (Medium Risk) ( of  - PPSV23) 1995 ---    Mammogram 2016 ---    TETANUS VACCINE 2025          Family History   Problem Relation  "Age of Onset    Hypertension Father     Hypertension Mother     Stroke Mother        Social History     Socioeconomic History    Marital status:      Spouse name: Not on file    Number of children: Not on file    Years of education: Not on file    Highest education level: Not on file   Occupational History    Not on file   Social Needs    Financial resource strain: Not on file    Food insecurity:     Worry: Not on file     Inability: Not on file    Transportation needs:     Medical: Not on file     Non-medical: Not on file   Tobacco Use    Smoking status: Current Every Day Smoker     Packs/day: 1.00     Types: Cigarettes, Vaping with nicotine    Smokeless tobacco: Never Used   Substance and Sexual Activity    Alcohol use: No     Frequency: Never    Drug use: Yes     Types: Marijuana     Comment: last dose last week.    Sexual activity: Yes     Partners: Male     Birth control/protection: See Surgical Hx   Lifestyle    Physical activity:     Days per week: Not on file     Minutes per session: Not on file    Stress: Not on file   Relationships    Social connections:     Talks on phone: Not on file     Gets together: Not on file     Attends [employer] service: Not on file     Active member of club or organization: Not on file     Attends meetings of clubs or organizations: Not on file     Relationship status: Not on file   Other Topics Concern    Not on file   Social History Narrative    Not on file       Review of Systems  Review of Systems   Genitourinary: Positive for frequency, pelvic pain, urgency and vaginal pain.          Objective:   /88   Pulse 79   Resp 18   Ht 5' 6" (1.676 m)   Wt 69 kg (152 lb 1.9 oz)   BMI 24.55 kg/m²     Physical Exam   Deferred at this time  Assessment:     1. Urinary frequency    2. IC (interstitial cystitis)            Plan:     1. Urinary frequency    2. IC (interstitial cystitis)      Given that were not having success and the seeing that the " patient has had tremendous efficacy from prior hydro distension done by colleague I am going to move forward and post this procedure cysto with hydrodistention as well as carry out exam under anesthesia.  Patient understanding patient many questions regarding medications I reviewed all and discussed importance I want to have patient really take the Urogesic      Patient understanding we will post this the procedure patient understanding appreciated of the time.  Total time 25 min greater than 50% of time 15 min in direct discussion with patient and reviewing medical profiles as well as discussing the efficacy of hydro distension it is interesting to note she is not getting any benefit from the bladder installations.    There are no Patient Instructions on file for this visit.

## 2019-12-02 ENCOUNTER — ANESTHESIA EVENT (OUTPATIENT)
Dept: SURGERY | Facility: AMBULARY SURGERY CENTER | Age: 44
End: 2019-12-02
Payer: MEDICARE

## 2019-12-02 DIAGNOSIS — R10.2 PELVIC PAIN: ICD-10-CM

## 2019-12-02 DIAGNOSIS — N30.10 IC (INTERSTITIAL CYSTITIS): Primary | ICD-10-CM

## 2019-12-03 ENCOUNTER — HOSPITAL ENCOUNTER (OUTPATIENT)
Facility: AMBULARY SURGERY CENTER | Age: 44
Discharge: HOME | End: 2019-12-03
Attending: OBSTETRICS & GYNECOLOGY | Admitting: OBSTETRICS & GYNECOLOGY
Payer: MEDICARE

## 2019-12-03 ENCOUNTER — ANESTHESIA (OUTPATIENT)
Dept: SURGERY | Facility: AMBULARY SURGERY CENTER | Age: 44
End: 2019-12-03
Payer: MEDICARE

## 2019-12-03 VITALS
WEIGHT: 152 LBS | BODY MASS INDEX: 24.43 KG/M2 | TEMPERATURE: 98 F | DIASTOLIC BLOOD PRESSURE: 72 MMHG | OXYGEN SATURATION: 97 % | HEIGHT: 66 IN | HEART RATE: 81 BPM | RESPIRATION RATE: 18 BRPM | SYSTOLIC BLOOD PRESSURE: 106 MMHG

## 2019-12-03 DIAGNOSIS — R35.0 URINARY FREQUENCY: ICD-10-CM

## 2019-12-03 DIAGNOSIS — N30.10 IC (INTERSTITIAL CYSTITIS): ICD-10-CM

## 2019-12-03 PROBLEM — R10.2 PELVIC PAIN: Status: ACTIVE | Noted: 2019-12-03

## 2019-12-03 PROCEDURE — 52260 CYSTOSCOPY AND TREATMENT: CPT | Mod: ,,, | Performed by: OBSTETRICS & GYNECOLOGY

## 2019-12-03 PROCEDURE — 52260 CYSTOSCOPY AND TREATMENT: CPT | Performed by: OBSTETRICS & GYNECOLOGY

## 2019-12-03 PROCEDURE — D9220A PRA ANESTHESIA: Mod: ANES,,, | Performed by: ANESTHESIOLOGY

## 2019-12-03 PROCEDURE — D9220A PRA ANESTHESIA: Mod: CRNA,,, | Performed by: NURSE ANESTHETIST, CERTIFIED REGISTERED

## 2019-12-03 PROCEDURE — D9220A PRA ANESTHESIA: ICD-10-PCS | Mod: ANES,,, | Performed by: ANESTHESIOLOGY

## 2019-12-03 PROCEDURE — 52260 PR CYSTOSCOPY,DIL BLADDER,GEN ANESTH: ICD-10-PCS | Mod: ,,, | Performed by: OBSTETRICS & GYNECOLOGY

## 2019-12-03 PROCEDURE — D9220A PRA ANESTHESIA: ICD-10-PCS | Mod: CRNA,,, | Performed by: NURSE ANESTHETIST, CERTIFIED REGISTERED

## 2019-12-03 RX ORDER — LIDOCAINE HYDROCHLORIDE 20 MG/ML
JELLY TOPICAL
Status: DISCONTINUED | OUTPATIENT
Start: 2019-12-03 | End: 2019-12-03 | Stop reason: HOSPADM

## 2019-12-03 RX ORDER — HYDROMORPHONE HYDROCHLORIDE 1 MG/ML
0.2 INJECTION, SOLUTION INTRAMUSCULAR; INTRAVENOUS; SUBCUTANEOUS EVERY 5 MIN PRN
Status: DISCONTINUED | OUTPATIENT
Start: 2019-12-03 | End: 2019-12-03 | Stop reason: HOSPADM

## 2019-12-03 RX ORDER — ONDANSETRON 2 MG/ML
INJECTION INTRAMUSCULAR; INTRAVENOUS
Status: DISCONTINUED | OUTPATIENT
Start: 2019-12-03 | End: 2019-12-03

## 2019-12-03 RX ORDER — SODIUM CHLORIDE 0.9 % (FLUSH) 0.9 %
3 SYRINGE (ML) INJECTION
Status: DISCONTINUED | OUTPATIENT
Start: 2019-12-03 | End: 2019-12-03 | Stop reason: HOSPADM

## 2019-12-03 RX ORDER — LIDOCAINE HCL/PF 100 MG/5ML
SYRINGE (ML) INTRAVENOUS
Status: DISCONTINUED | OUTPATIENT
Start: 2019-12-03 | End: 2019-12-03

## 2019-12-03 RX ORDER — OXYCODONE HYDROCHLORIDE 5 MG/1
5 TABLET ORAL
Status: DISCONTINUED | OUTPATIENT
Start: 2019-12-03 | End: 2019-12-03 | Stop reason: HOSPADM

## 2019-12-03 RX ORDER — FENTANYL CITRATE 50 UG/ML
25 INJECTION, SOLUTION INTRAMUSCULAR; INTRAVENOUS EVERY 5 MIN PRN
Status: DISCONTINUED | OUTPATIENT
Start: 2019-12-03 | End: 2019-12-03 | Stop reason: HOSPADM

## 2019-12-03 RX ORDER — ONDANSETRON 2 MG/ML
4 INJECTION INTRAMUSCULAR; INTRAVENOUS ONCE AS NEEDED
Status: DISCONTINUED | OUTPATIENT
Start: 2019-12-03 | End: 2019-12-03 | Stop reason: HOSPADM

## 2019-12-03 RX ORDER — PROPOFOL 10 MG/ML
VIAL (ML) INTRAVENOUS
Status: DISCONTINUED | OUTPATIENT
Start: 2019-12-03 | End: 2019-12-03

## 2019-12-03 RX ORDER — SODIUM CHLORIDE, SODIUM LACTATE, POTASSIUM CHLORIDE, CALCIUM CHLORIDE 600; 310; 30; 20 MG/100ML; MG/100ML; MG/100ML; MG/100ML
75 INJECTION, SOLUTION INTRAVENOUS CONTINUOUS
Status: DISCONTINUED | OUTPATIENT
Start: 2019-12-03 | End: 2019-12-03 | Stop reason: HOSPADM

## 2019-12-03 RX ORDER — MIDAZOLAM HYDROCHLORIDE 1 MG/ML
INJECTION, SOLUTION INTRAMUSCULAR; INTRAVENOUS
Status: DISCONTINUED | OUTPATIENT
Start: 2019-12-03 | End: 2019-12-03

## 2019-12-03 RX ORDER — LIDOCAINE HYDROCHLORIDE 10 MG/ML
1 INJECTION, SOLUTION EPIDURAL; INFILTRATION; INTRACAUDAL; PERINEURAL ONCE
Status: COMPLETED | OUTPATIENT
Start: 2019-12-03 | End: 2019-12-03

## 2019-12-03 RX ORDER — LIDOCAINE HYDROCHLORIDE 20 MG/ML
JELLY TOPICAL
Status: DISCONTINUED
Start: 2019-12-03 | End: 2019-12-03 | Stop reason: HOSPADM

## 2019-12-03 RX ORDER — SODIUM CHLORIDE 0.9 % (FLUSH) 0.9 %
3 SYRINGE (ML) INJECTION EVERY 8 HOURS
Status: ACTIVE | OUTPATIENT
Start: 2019-12-03

## 2019-12-03 RX ORDER — DIPHENHYDRAMINE HYDROCHLORIDE 50 MG/ML
25 INJECTION INTRAMUSCULAR; INTRAVENOUS EVERY 6 HOURS PRN
Status: DISCONTINUED | OUTPATIENT
Start: 2019-12-03 | End: 2019-12-03 | Stop reason: HOSPADM

## 2019-12-03 RX ORDER — DEXAMETHASONE SODIUM PHOSPHATE 4 MG/ML
INJECTION, SOLUTION INTRA-ARTICULAR; INTRALESIONAL; INTRAMUSCULAR; INTRAVENOUS; SOFT TISSUE
Status: DISCONTINUED | OUTPATIENT
Start: 2019-12-03 | End: 2019-12-03

## 2019-12-03 RX ORDER — SODIUM CHLORIDE, SODIUM LACTATE, POTASSIUM CHLORIDE, CALCIUM CHLORIDE 600; 310; 30; 20 MG/100ML; MG/100ML; MG/100ML; MG/100ML
INJECTION, SOLUTION INTRAVENOUS CONTINUOUS
Status: DISCONTINUED | OUTPATIENT
Start: 2019-12-03 | End: 2019-12-03 | Stop reason: HOSPADM

## 2019-12-03 RX ORDER — FENTANYL CITRATE 50 UG/ML
INJECTION, SOLUTION INTRAMUSCULAR; INTRAVENOUS
Status: DISCONTINUED | OUTPATIENT
Start: 2019-12-03 | End: 2019-12-03

## 2019-12-03 RX ORDER — MEPERIDINE HYDROCHLORIDE 25 MG/ML
12.5 INJECTION INTRAMUSCULAR; INTRAVENOUS; SUBCUTANEOUS ONCE AS NEEDED
Status: DISCONTINUED | OUTPATIENT
Start: 2019-12-03 | End: 2019-12-03 | Stop reason: HOSPADM

## 2019-12-03 RX ADMIN — MIDAZOLAM HYDROCHLORIDE 2 MG: 1 INJECTION, SOLUTION INTRAMUSCULAR; INTRAVENOUS at 11:12

## 2019-12-03 RX ADMIN — SODIUM CHLORIDE, SODIUM LACTATE, POTASSIUM CHLORIDE, CALCIUM CHLORIDE: 600; 310; 30; 20 INJECTION, SOLUTION INTRAVENOUS at 11:12

## 2019-12-03 RX ADMIN — FENTANYL CITRATE 50 MCG: 50 INJECTION, SOLUTION INTRAMUSCULAR; INTRAVENOUS at 11:12

## 2019-12-03 RX ADMIN — Medication 200 MG: at 11:12

## 2019-12-03 RX ADMIN — LIDOCAINE HYDROCHLORIDE 5 MG: 10 INJECTION, SOLUTION EPIDURAL; INFILTRATION; INTRACAUDAL; PERINEURAL at 11:12

## 2019-12-03 RX ADMIN — DEXAMETHASONE SODIUM PHOSPHATE 4 MG: 4 INJECTION, SOLUTION INTRA-ARTICULAR; INTRALESIONAL; INTRAMUSCULAR; INTRAVENOUS; SOFT TISSUE at 12:12

## 2019-12-03 RX ADMIN — Medication 100 MG: at 11:12

## 2019-12-03 RX ADMIN — ONDANSETRON 4 MG: 2 INJECTION INTRAMUSCULAR; INTRAVENOUS at 12:12

## 2019-12-03 NOTE — TRANSFER OF CARE
"Anesthesia Transfer of Care Note    Patient: Rosalina Jacobo    Procedure(s) Performed: Procedure(s) (LRB):  CYSTOSCOPY, WITH BLADDER HYDRODISTENSION (N/A)    Patient location: PACU    Anesthesia Type: general    Transport from OR: Transported from OR on 2-3 L/min O2 by NC with adequate spontaneous ventilation    Post pain: adequate analgesia    Post assessment: no apparent anesthetic complications    Post vital signs: stable    Level of consciousness: sedated    Nausea/Vomiting: no nausea/vomiting    Complications: none    Transfer of care protocol was followed      Last vitals:   Visit Vitals  BP (!) 101/58   Pulse 68   Temp 36.6 °C (97.9 °F) (Skin)   Resp 18   Ht 5' 6" (1.676 m)   Wt 68.9 kg (152 lb)   SpO2 99%   Breastfeeding? No   BMI 24.53 kg/m²     "

## 2019-12-03 NOTE — DISCHARGE INSTRUCTIONS
Anesthesia: After Your Surgery  Youve just had surgery. During surgery, you received medication called anesthesia to keep you comfortable and pain-free. After surgery, you may experience some pain or nausea. This is normal. Here are some tips for feeling better and recovering after surgery.         Stay on schedule with your medication.   Going Home  Your doctor or nurse will show you how to take care of yourself when you go home. He or she will also answer your questions. Have an adult family member or friend drive you home. For the first 24 hours after your surgery:  · Do not drive or use heavy equipment.  · Do not make important decisions or sign documents.  · Avoid alcohol.  · Have someone stay with you, if possible. They can watch for problems and help keep you safe.  Be sure to keep all follow-up doctors appointments. And rest after your procedure for as long as your doctor tells you to.  Coping with Pain  If you have pain after surgery, pain medication will help you feel better. Take it as directed, before pain becomes severe. Also, ask your doctor or pharmacist about other ways to control pain, such as with heat, ice, and relaxation. And follow any other instructions your surgeon or nurse gives you.  Tips for Taking Pain Medication  To get the best relief possible, remember these points:  · Pain medications can upset your stomach. Taking them with a little food may help.  · Most pain relievers taken by mouth need at least 20 to 30 minutes to take effect.  · Taking medication on a schedule can help you remember to take it. Try to time your medication so that you can take it before beginning an activity, such as dressing, walking, or sitting down for dinner.  · Constipation is a common side effect of pain medications. Drink lots of fluids. Eating fruit and vegetables can also help. Dont take laxatives unless your surgeon has prescribed them.  · Mixing alcohol and pain medication can cause dizziness and slow  your breathing. It can even be fatal. Dont drink alcohol while taking pain medication.  · Pain medication can slow your reflexes. Dont drive or operate machinery while taking pain medication.  Managing Nausea  Some people have an upset stomach after surgery. This is often due to anesthesia, pain, pain medications, or the stress of surgery. The following tips will help you manage nausea and get good nutrition as you recover. If you were on a special diet before surgery, ask your doctor if you should follow it during recovery. These tips may help:  · Dont push yourself to eat. Your body will tell you what to eat and when.  · Start off with liquids and soup. They are easier to digest.  · Progress to semisolids (mashed potatoes, applesauce, and gelatin) as you feel ready.  · Slowly move to solid foods. Dont eat fatty, rich, or spicy foods at first.  · Dont force yourself to have three large meals a day. Instead, eat smaller amounts more often.  · Take pain medications with a small amount of solid food, such as crackers or toast.  Call Your Surgeon If  · You still have pain an hour after taking medication (it may not be strong enough).  · You feel too sleepy, dizzy, or groggy (medication may be too strong).  · You have side effects like nausea, vomiting, or skin changes (rash, itching, or hives).     Cystoscopy    Cystoscopy is a procedure that lets your doctor look directly inside your urethra and bladder. It can be used to:  · Help diagnose a problem with your urethra, bladder, or kidneys.  · Take a sample (biopsy) of bladder or urethral tissue.  · Treat certain problems (such as removing kidney stones).  · Place a stent to bypass an obstruction.  · Take special X-rays of the kidneys.  Based on the findings, your doctor may recommend other tests or treatments.  What is a cystoscope?  A cystoscope is a telescope-like instrument that contains lenses and fiberoptics (small glass wires that make bright light). The  cystoscope may be straight and rigid, or flexible to bend around curves in the urethra. The doctor may look directly into the cystoscope, or project the image onto a monitor.  Getting ready  · Ask your doctor if you should stop taking any medicines before the procedure.  · Ask whether you should avoid eating or drinking anything after midnight before the procedure.  · Follow any other instructions your doctor gives you.  Tell your doctor before the exam if you:  · Take any medicines, such as aspirin or blood thinners  · Have allergies to any medicines  · Are pregnant   The procedure  Cystoscopy is done in the doctors office, surgery center, or hospital. The doctor and a nurse are present during the procedure. It takes only a few minutes, longer if a biopsy, X-ray, or treatment needs to be done.  During the procedure:  · You lie on an exam table on your back, knees bent and legs apart. You are covered with a drape.  · Your urethra and the area around it are washed. Anesthetic jelly may be applied to numb the urethra. Other pain medicine is usually not needed. In some cases, you may be offered a mild sedative to help you relax. If a more extensive procedure is to be done, such as a biopsy or kidney stone removal, general anesthesia may be needed.  · The cystoscope is inserted. A sterile fluid is put into the bladder to expand it. You may feel pressure from this fluid.  · When the procedure is done, the cystoscope is removed.  After the procedure  If you had a sedative, general anesthesia, or spinal anesthesia, you must have someone drive you home. Once youre home:  · Drink plenty of fluids.  · You may have burning or light bleeding when you urinate--this is normal.  · Medicines may be prescribed to ease any discomfort or prevent infection. Take these as directed.  · Call your doctor if you have heavy bleeding or blood clots, burning that lasts more than a day, a fever over 100°F  (38° C), or trouble  urinating.    After Surgery:  Always be aware that any surgery can cause these symptoms:    Pain- Medication can be prescribed for pain to decrease your pain but may not completely take your pain away.  Over the Counter pain medicine my be enough and you can always use Ice and rest to help ease pain.    Bleeding- a little bleeding after a surgery is usually within normal.  If there is a lot of blood you need to notify your MD.  Emergency treatments of bleeding are cold application, elevation of the bleeding site and compression.    Infection- Infection after surgery is NOT a normal occurrence.  Signs of infection are fever, swelling, hot to touch the incision.  If this occurs notify your MD immediately.    Nausea- this can be common after a surgery especially if you have had anesthesia medicine or are taking pain medicine.  Staying on clear liquids, bland foods, gingerale, or over the counter anti nausea medicines can help.  If you vomit more than once, notify your MD.  Anti Nausea medicines can be prescribed.

## 2019-12-03 NOTE — INTERVAL H&P NOTE
The patient has been examined and the H&P has been reviewed:    I concur with the findings and no changes have occurred since H&P was written.    Anesthesia/Surgery risks, benefits and alternative options discussed and understood by patient/family.          Active Hospital Problems    Diagnosis  POA    Urinary frequency [R35.0]  Yes    Pelvic pain [R10.2]  Yes      Resolved Hospital Problems   No resolved problems to display.

## 2019-12-03 NOTE — ANESTHESIA POSTPROCEDURE EVALUATION
Anesthesia Post Evaluation    Patient: Rosalina Jacobo    Procedure(s) Performed: Procedure(s) (LRB):  CYSTOSCOPY, WITH BLADDER HYDRODISTENSION (N/A)    Final Anesthesia Type: general    Patient location during evaluation: PACU  Patient participation: Yes- Able to Participate  Level of consciousness: awake and alert and oriented  Post-procedure vital signs: reviewed and stable  Pain management: adequate  Airway patency: patent    PONV status at discharge: No PONV  Anesthetic complications: no      Cardiovascular status: blood pressure returned to baseline  Respiratory status: unassisted, spontaneous ventilation and room air  Hydration status: euvolemic  Follow-up not needed.          Vitals Value Taken Time   /67 12/3/2019 12:46 PM   Temp 36.6 °C (97.9 °F) 12/3/2019 12:20 PM   Pulse 74 12/3/2019 12:47 PM   Resp 18 12/3/2019 12:40 PM   SpO2 98 % 12/3/2019 12:47 PM   Vitals shown include unvalidated device data.      No case tracking events are documented in the log.      Pain/Ian Score: Ian Score: 8 (12/3/2019 12:35 PM)

## 2019-12-03 NOTE — OP NOTE
Title of Operation:  1) cystourethroscopy  2) hydrodistension of bladder      Indications for Surgery:  Suspected interstitial cystitis who benefit previously from hydro distension another provider  Preoperative Diagnosis:  Interstitial cystitis    Postoperative Diagnosis:  Pelvic pain  Cystitis cystica    Anesthesia:  General endotracheal anesthesia.  Additionally, 10 mL of 2% viscous lidocaine were injected transurethrally into the bladder for local effect at the close of the case.    Specimen (Bacteriological, Pathological or other):  None.    Prosthetic Device/Implant:  None.    Surgeons Narrative:  Surgeon:  Jordi Batres MD    Assistants:  None    IV Fluids:   crystalloid mL.    Urine Output:   100 mL per in/out cath at beginning of case.    Estimated Blood Loss:  minimal    Complications:  None.    Sponge, Lap Count:  Verified correct x 2.    Findings:    1)  Exam under anesthesia:  Normal external female genitalia. There were no lesions or lacerations.  The uterus is normal size, anteflexed.  There is minimal decensus of the cervix.  No obvious masses palpated.    2)  On cystoscopy:   Before start of the case, 100 ml of urine were drained from the bladder with a straight catheter.  Using a 70 degree cystoscope, the bladder was then instilled with 300 ml normal saline, and a systematic survey of the bladder was completed from the dome to the base to the urethrovesical junction.  During this survey, most of the bladder mucosa was normal except for evidence of this 1st cystitis cystica throughout the   Both ureteral orfices were visualized and noted to have good efflux bilaterally.   We then continued to fill the bladder to 500 mL.  After sustaining this 500 mL fill for 5 minutes, we emptied the entire contents of the bladder (550 mL), .  The same bladder survey was repeated after filling again to 500 mL.   At this point, there were not areas of glomerulation >10/HPF present in none quadrants of the  bladder x  360 degrees.  There were not no other obvious lesions of masses noted.   After completion of this final survey, a vaginal finger was used hold pressure against the proximal urethra, and urethroscopy was performed during extraction of the cystoscope.  The urethra appeared Normal, without obvious lesion, mass, or dicerticulum.  The bladder was then drained, with the volume of this contents measuring 500 mL.    Description of the Procedure:    The patient was identified in the preoperative area where consent was confirmed, and she was taken into the operating room where general endotracheal anesthesia was obtained.  She was positioned in candy cane stirrups in high lithotomy position.  Care was taken to avoid joint hyperflexion or hyperextension, and all extremity surfaces were carefully padded so as to minimize the risk of neurologic injury. Intravenous antibiotics were administered preoperatively.  Sequential compression devices as well as REESE hose were applied to the patient's lower extremities preoperatively.  A surgical time-out was performed where the patient was identified and procedures confirmed.  An exam under anesthesia was performed with findings as described above.  The patient's perineum and vagina were sterilely prepped and draped.    The procedure commenced with a cystoscopic survey using a 70 degree cystoscope.  The bladder was instilled with 300 mL of normal saline, after which a systematic survey of the bladder was completed.  Please see above findings for details regarding this survey.  At this point, we continued to fill the bladder slowly to 500 mL, when pressure equivalent to approximately 80 cm/H20 had been reached.  We stopped filling, removed the cystoscope, and we allowed the 500 mL of cystoscopic fluid to remain in the bladder for 5 minutes.  We then performed straight catheterization of the bladder, draining 550 ml total.  At this point, we refilled the bladder to 500 mL using the  cystoscope, and repeated a systematic survey of the bladder with findings as noted above.  Urethroscopy was also performed, on extraction of the cystoscope, with findings as noted above. On completion of the second suvey, we drained the patient's bladder, [id number] mL.  Finally, we injected 10 mL of 2% lidocaine jelly transurethrally for anesthetic effect.    The patient's legs were taken out of lithotomy, and she was returned to supine position.  All drapes were removed, and she was cleaned.  At the end of the case all counts were correct x 2.  She was awakened from anesthesia, extubated, and taken to the PACU in stable condition.  She tolerated the procedure well.    I was scrubbed and present for the entire procedure. I have reviewed the dictation and agree with the report.

## 2019-12-03 NOTE — ANESTHESIA PREPROCEDURE EVALUATION
12/03/2019  Rosalina Jacobo is a 44 y.o., female.    Pre-op Assessment    I have reviewed the Patient Summary Reports.     I have reviewed the Nursing Notes.   I have reviewed the Medications.     Review of Systems  Anesthesia Hx:  No problems with previous Anesthesia Denies Hx of Anesthetic complications    Social:  Smoker Smoking Status: Current Every Day Smoker  Smokeless Tobacco Status: Never Used  Alcohol use: No  Drug use: Marijuana       Hematology/Oncology:         -- Anemia:   Cardiovascular:  Cardiovascular Normal  Denies Hypertension.  Denies MI.  Denies CAD.    Denies CABG/stent.   Denies Angina.    Pulmonary:   Denies COPD. Asthma mild and asymptomatic  Denies Recent URI.    Renal/:  Renal/ Normal  Denies Chronic Renal Disease.     Hepatic/GI:   Denies GERD. Denies Liver Disease.    Musculoskeletal:   Arthritis     Neurological:   Denies TIA. CVA Headaches Denies Seizures.    Endocrine:  Endocrine Normal Denies Diabetes. Denies Hypothyroidism.    Psych:   Psychiatric History (PTSD, Bipolar, ADHD) anxiety          Physical Exam  General:  Well nourished    Airway/Jaw/Neck:  Airway Findings: Mouth Opening: Normal Tongue: Normal  General Airway Assessment: Adult  Oropharynx Findings:  Mallampati: II  Improves to II with phonation.  TM Distance: 4-6 cm  Jaw/Neck Findings:  Neck ROM: Normal ROM     Eyes/Ears/Nose:  Eyes/Ears/Nose Findings:    Dental:  Dental Findings: In tact   Chest/Lungs:  Chest/Lungs Findings: Normal Respiratory Rate     Heart/Vascular:  Heart Findings: Rate: Normal  Rhythm: Regular Rhythm  Sounds: Normal  Heart murmur: negative       Mental Status:  Mental Status Findings:  Cooperative, Alert and Oriented         Anesthesia Plan  Type of Anesthesia, risks & benefits discussed:  Anesthesia Type:  general  Patient's Preference:   Intra-op Monitoring Plan: standard ASA  monitors  Intra-op Monitoring Plan Comments:   Post Op Pain Control Plan: multimodal analgesia  Post Op Pain Control Plan Comments:   Induction:   IV  Beta Blocker:  Patient is not currently on a Beta-Blocker (No further documentation required).       Informed Consent: Patient understands risks and agrees with Anesthesia plan.  Questions answered. Anesthesia consent signed with patient.  ASA Score: 2     Day of Surgery Review of History & Physical: I have interviewed and examined the patient. I have reviewed the patient's H&P dated:  There are no significant changes.  H&P update referred to the surgeon.  H&P completed by Anesthesiologist.       Ready For Surgery From Anesthesia Perspective.

## 2019-12-03 NOTE — ANESTHESIA PROCEDURE NOTES
Intubation  Performed by: Yolette Das CRNA  Authorized by: Ray Cazares MD     Intubation:     Induction:  Intravenous    Intubated:  Postinduction    Attempts:  1    Attempted By:  CRNA    Difficult Airway Encountered?: No      Complications:  None    Airway Device Size:  3.0    Style/Cuff Inflation:  Cuffed (inflated to minimal occlusive pressure)    Secured at:  The lips    Placement Verified By:  Capnometry    Complicating Factors:  None    Findings Post-Intubation:  BS equal bilateral

## 2020-03-19 ENCOUNTER — NURSE TRIAGE (OUTPATIENT)
Dept: ADMINISTRATIVE | Facility: CLINIC | Age: 45
End: 2020-03-19

## 2020-03-19 NOTE — TELEPHONE ENCOUNTER
Rosalina Reardon called with concern of right arm blisters.  She has cough that began on March 3, diarrhea and fever of 99.8. She denies any known exposure to COVID19. She had knee surgery on March 5.  Rosalina Jacobo was recommended to go to Urgent Care today and if not available to then go to ED. She agreed and her  will be taking her to [facility].     Reason for Disposition   Painful rash with multiple small blisters grouped together (i.e., dermatomal distribution or 'band' or 'stripe')    Additional Information   Negative: Bluish (or gray) lips or face   Negative: Severe difficulty breathing (e.g., struggling for each breath, speaks in single words)   Negative: Rapid onset of cough and has hives   Negative: Coughing started suddenly after medicine, an allergic food or bee sting   Negative: Difficulty breathing after exposure to flames, smoke, or fumes   Negative: Sounds like a life-threatening emergency to the triager   Negative: Previous asthma attacks and this feels like asthma attack   Negative: Chest pain present when not coughing   Negative: Difficulty breathing   Negative: Passed out (i.e., fainted, collapsed and was not responding)   Negative: Patient sounds very sick or weak to the triager   Negative: Coughed up > 1 tablespoon (15 ml) blood (Exception: blood-tinged sputum)   Negative: Fever > 103 F (39.4 C)   Negative: Fever > 101 F (38.3 C) and over 60 years of age   Negative: Fever > 100.0 F (37.8 C) and has diabetes mellitus or a weak immune system (e.g., HIV positive, cancer chemotherapy, organ transplant, splenectomy, chronic steroids)   Negative: Fever > 100.0 F (37.8 C) and bedridden (e.g., nursing home patient, stroke, chronic illness, recovering from surgery)   Negative: Increasing ankle swelling   Negative: Wheezing is present   Negative: Coughing up talisha-colored (reddish-brown) or blood-tinged sputum   Negative: SEVERE coughing spells (e.g., whooping sound  after coughing, vomiting after coughing)   Negative: Fever present > 3 days (72 hours)   Negative: Fever returns after gone for over 24 hours and symptoms worse or not improved   Negative: Using nasal washes and pain medicine > 24 hours and sinus pain persists   Negative: Known COPD or other severe lung disease (i.e., bronchiectasis, cystic fibrosis, lung surgery) and worsening symptoms (i.e., increased sputum purulence or amount, increased breathing difficulty)   Negative: Sounds like a life-threatening emergency to the triager   Negative: Possible contact with poison ivy or oak   Negative: Insect bite(s) suspected   Negative: Athlete's Foot suspected (i.e., itchy rash between the toes)   Negative: Jock Itch suspected (i.e., itchy rash on inner thighs near genital area)   Negative: Wound infection suspected (i.e., pain, spreading redness, or pus; in a cut, puncture, scrape or sutured wound)   Negative: Rash of external female genital area (vulva)   Negative: Rash of penis or scrotum   Negative: Small spot, skin growth, or mole   Negative: Fever and localized purple or blood-colored spots or dots that are not from injury or friction   Negative: Fever and localized rash is very painful   Negative: Patient sounds very sick or weak to the triager   Negative: Looks like a boil, infected sore, deep ulcer, or other infected rash (spreading redness, pus)    Protocols used: RASH OR REDNESS - DSDTFXQCS-X-CG, COUGH-A-OH

## 2020-04-08 ENCOUNTER — HOSPITAL ENCOUNTER (EMERGENCY)
Facility: HOSPITAL | Age: 45
Discharge: HOME | End: 2020-04-08
Attending: EMERGENCY MEDICINE
Payer: MEDICARE

## 2020-04-08 ENCOUNTER — NURSE TRIAGE (OUTPATIENT)
Dept: ADMINISTRATIVE | Facility: CLINIC | Age: 45
End: 2020-04-08

## 2020-04-08 VITALS
OXYGEN SATURATION: 98 % | BODY MASS INDEX: 25.71 KG/M2 | HEART RATE: 72 BPM | RESPIRATION RATE: 16 BRPM | DIASTOLIC BLOOD PRESSURE: 60 MMHG | WEIGHT: 160 LBS | HEIGHT: 66 IN | SYSTOLIC BLOOD PRESSURE: 98 MMHG | TEMPERATURE: 99 F

## 2020-04-08 DIAGNOSIS — J06.9 UPPER RESPIRATORY TRACT INFECTION, UNSPECIFIED TYPE: Primary | ICD-10-CM

## 2020-04-08 DIAGNOSIS — R68.89 FLU-LIKE SYMPTOMS: ICD-10-CM

## 2020-04-08 DIAGNOSIS — R06.02 SOB (SHORTNESS OF BREATH): ICD-10-CM

## 2020-04-08 LAB
ALBUMIN SERPL BCP-MCNC: 3.9 G/DL (ref 3.5–5.2)
ALP SERPL-CCNC: 99 U/L (ref 55–135)
ALT SERPL W/O P-5'-P-CCNC: 22 U/L (ref 10–44)
ANION GAP SERPL CALC-SCNC: 13 MMOL/L (ref 8–16)
AST SERPL-CCNC: 19 U/L (ref 10–40)
BASOPHILS # BLD AUTO: 0.07 K/UL (ref 0–0.2)
BASOPHILS NFR BLD: 0.8 % (ref 0–1.9)
BILIRUB SERPL-MCNC: 0.5 MG/DL (ref 0.1–1)
BILIRUB UR QL STRIP: NEGATIVE
BNP SERPL-MCNC: 15 PG/ML (ref 0–99)
BUN SERPL-MCNC: 21 MG/DL (ref 6–20)
CALCIUM SERPL-MCNC: 8.7 MG/DL (ref 8.7–10.5)
CHLORIDE SERPL-SCNC: 100 MMOL/L (ref 95–110)
CK SERPL-CCNC: 47 U/L (ref 20–180)
CLARITY UR: CLEAR
CO2 SERPL-SCNC: 23 MMOL/L (ref 23–29)
COLOR UR: YELLOW
CREAT SERPL-MCNC: 0.8 MG/DL (ref 0.5–1.4)
CRP SERPL-MCNC: 0.17 MG/DL (ref 0–0.75)
DIFFERENTIAL METHOD: NORMAL
EOSINOPHIL # BLD AUTO: 0.5 K/UL (ref 0–0.5)
EOSINOPHIL NFR BLD: 5.5 % (ref 0–8)
ERYTHROCYTE [DISTWIDTH] IN BLOOD BY AUTOMATED COUNT: 12.4 % (ref 11.5–14.5)
EST. GFR  (AFRICAN AMERICAN): >60 ML/MIN/1.73 M^2
EST. GFR  (NON AFRICAN AMERICAN): >60 ML/MIN/1.73 M^2
FERRITIN SERPL-MCNC: 38 NG/ML (ref 20–300)
GLUCOSE SERPL-MCNC: 136 MG/DL (ref 70–110)
GLUCOSE UR QL STRIP: NEGATIVE
HCT VFR BLD AUTO: 40.8 % (ref 37–48.5)
HGB BLD-MCNC: 13.5 G/DL (ref 12–16)
HGB UR QL STRIP: NEGATIVE
IMM GRANULOCYTES # BLD AUTO: 0.02 K/UL (ref 0–0.04)
IMM GRANULOCYTES NFR BLD AUTO: 0.2 % (ref 0–0.5)
KETONES UR QL STRIP: NEGATIVE
LACTATE SERPL-SCNC: 1.1 MMOL/L (ref 0.5–1.9)
LACTATE SERPL-SCNC: 2.6 MMOL/L (ref 0.5–1.9)
LDH SERPL L TO P-CCNC: 127 U/L (ref 110–260)
LEUKOCYTE ESTERASE UR QL STRIP: NEGATIVE
LIPASE SERPL-CCNC: 35 U/L (ref 4–60)
LYMPHOCYTES # BLD AUTO: 3.6 K/UL (ref 1–4.8)
LYMPHOCYTES NFR BLD: 42.5 % (ref 18–48)
MCH RBC QN AUTO: 29.3 PG (ref 27–31)
MCHC RBC AUTO-ENTMCNC: 33.1 G/DL (ref 32–36)
MCV RBC AUTO: 89 FL (ref 82–98)
MONOCYTES # BLD AUTO: 0.6 K/UL (ref 0.3–1)
MONOCYTES NFR BLD: 6.7 % (ref 4–15)
NEUTROPHILS # BLD AUTO: 3.7 K/UL (ref 1.8–7.7)
NEUTROPHILS NFR BLD: 44.3 % (ref 38–73)
NITRITE UR QL STRIP: NEGATIVE
NRBC BLD-RTO: 0 /100 WBC
PH UR STRIP: 6 [PH] (ref 5–8)
PLATELET # BLD AUTO: 284 K/UL (ref 150–350)
PMV BLD AUTO: 9.5 FL (ref 9.2–12.9)
POTASSIUM SERPL-SCNC: 3.2 MMOL/L (ref 3.5–5.1)
PROT SERPL-MCNC: 6.7 G/DL (ref 6–8.4)
PROT UR QL STRIP: NEGATIVE
RBC # BLD AUTO: 4.6 M/UL (ref 4–5.4)
SARS-COV-2 RDRP RESP QL NAA+PROBE: NEGATIVE
SODIUM SERPL-SCNC: 136 MMOL/L (ref 136–145)
SP GR UR STRIP: 1.01 (ref 1–1.03)
TROPONIN I SERPL DL<=0.01 NG/ML-MCNC: <0.03 NG/ML
URN SPEC COLLECT METH UR: NORMAL
UROBILINOGEN UR STRIP-ACNC: NEGATIVE EU/DL
WBC # BLD AUTO: 8.4 K/UL (ref 3.9–12.7)

## 2020-04-08 PROCEDURE — 80053 COMPREHEN METABOLIC PANEL: CPT

## 2020-04-08 PROCEDURE — 63600175 PHARM REV CODE 636 W HCPCS: Performed by: EMERGENCY MEDICINE

## 2020-04-08 PROCEDURE — 99285 EMERGENCY DEPT VISIT HI MDM: CPT | Mod: 25

## 2020-04-08 PROCEDURE — 87040 BLOOD CULTURE FOR BACTERIA: CPT | Mod: 59

## 2020-04-08 PROCEDURE — 86140 C-REACTIVE PROTEIN: CPT

## 2020-04-08 PROCEDURE — 84484 ASSAY OF TROPONIN QUANT: CPT

## 2020-04-08 PROCEDURE — 93005 ELECTROCARDIOGRAM TRACING: CPT | Performed by: INTERNAL MEDICINE

## 2020-04-08 PROCEDURE — 83605 ASSAY OF LACTIC ACID: CPT

## 2020-04-08 PROCEDURE — 25000003 PHARM REV CODE 250: Performed by: EMERGENCY MEDICINE

## 2020-04-08 PROCEDURE — 96374 THER/PROPH/DIAG INJ IV PUSH: CPT

## 2020-04-08 PROCEDURE — 36415 COLL VENOUS BLD VENIPUNCTURE: CPT

## 2020-04-08 PROCEDURE — 83880 ASSAY OF NATRIURETIC PEPTIDE: CPT

## 2020-04-08 PROCEDURE — U0002 COVID-19 LAB TEST NON-CDC: HCPCS

## 2020-04-08 PROCEDURE — 96361 HYDRATE IV INFUSION ADD-ON: CPT

## 2020-04-08 PROCEDURE — 82728 ASSAY OF FERRITIN: CPT

## 2020-04-08 PROCEDURE — 85025 COMPLETE CBC W/AUTO DIFF WBC: CPT

## 2020-04-08 PROCEDURE — 83690 ASSAY OF LIPASE: CPT

## 2020-04-08 PROCEDURE — 81003 URINALYSIS AUTO W/O SCOPE: CPT

## 2020-04-08 PROCEDURE — 82550 ASSAY OF CK (CPK): CPT

## 2020-04-08 PROCEDURE — 83615 LACTATE (LD) (LDH) ENZYME: CPT

## 2020-04-08 PROCEDURE — 96376 TX/PRO/DX INJ SAME DRUG ADON: CPT

## 2020-04-08 RX ORDER — SODIUM CHLORIDE 9 MG/ML
1000 INJECTION, SOLUTION INTRAVENOUS
Status: COMPLETED | OUTPATIENT
Start: 2020-04-08 | End: 2020-04-08

## 2020-04-08 RX ORDER — MELOXICAM 15 MG/1
15 TABLET ORAL DAILY
Status: ON HOLD | COMMUNITY
End: 2021-02-22 | Stop reason: HOSPADM

## 2020-04-08 RX ORDER — HALOPERIDOL 10 MG/1
5 TABLET ORAL 2 TIMES DAILY
Status: ON HOLD | COMMUNITY
Start: 2020-03-31 | End: 2021-02-22 | Stop reason: HOSPADM

## 2020-04-08 RX ORDER — HYDROMORPHONE HYDROCHLORIDE 1 MG/ML
0.5 INJECTION, SOLUTION INTRAMUSCULAR; INTRAVENOUS; SUBCUTANEOUS
Status: COMPLETED | OUTPATIENT
Start: 2020-04-08 | End: 2020-04-08

## 2020-04-08 RX ORDER — OXYCODONE AND ACETAMINOPHEN 10; 325 MG/1; MG/1
1 TABLET ORAL EVERY 4 HOURS PRN
Status: ON HOLD | COMMUNITY
Start: 2020-03-05 | End: 2021-02-22 | Stop reason: HOSPADM

## 2020-04-08 RX ORDER — ACETAMINOPHEN 500 MG
1000 TABLET ORAL
Status: COMPLETED | OUTPATIENT
Start: 2020-04-08 | End: 2020-04-08

## 2020-04-08 RX ADMIN — SODIUM CHLORIDE 1000 ML: 0.9 INJECTION, SOLUTION INTRAVENOUS at 09:04

## 2020-04-08 RX ADMIN — HYDROMORPHONE HYDROCHLORIDE 0.5 MG: 1 INJECTION, SOLUTION INTRAMUSCULAR; INTRAVENOUS; SUBCUTANEOUS at 08:04

## 2020-04-08 RX ADMIN — ACETAMINOPHEN 1000 MG: 500 TABLET, FILM COATED ORAL at 07:04

## 2020-04-08 RX ADMIN — SODIUM CHLORIDE 1000 ML: 0.9 INJECTION, SOLUTION INTRAVENOUS at 08:04

## 2020-04-08 RX ADMIN — HYDROMORPHONE HYDROCHLORIDE 0.5 MG: 1 INJECTION, SOLUTION INTRAMUSCULAR; INTRAVENOUS; SUBCUTANEOUS at 09:04

## 2020-04-08 NOTE — TELEPHONE ENCOUNTER
"R upper abdominal pain, "greater than 10 and wwelling o r upper abdomen. Vomitting and diarrhea immediately following intake. Symptoms  X 1 month. [facility]ing  [facility]    Reason for Disposition   Bloody, black, or tarry bowel movements   SEVERE abdominal pain (e.g., excruciating)   Patient sounds very sick or weak to the triager    Additional Information   Negative: Chest pain   Negative: Pain is mainly in upper abdomen (if needed ask: 'is it mainly above the belly button?')   Negative: Abdominal pain and pregnant > 20 weeks   Negative: Abdominal pain and pregnant < 20 weeks   Negative: Passed out (i.e., fainted, collapsed and was not responding)   Negative: Shock suspected (e.g., cold/pale/clammy skin, too weak to stand, low BP, rapid pulse)   Negative: Sounds like a life-threatening emergency to the triager   Negative: Vomiting bile (green color)   Negative: Constant abdominal pain lasting > 2 hours   Negative: Vomiting red blood or black (coffee ground) material   Negative: Vomiting and abdomen looks much more swollen than usual   Negative: White of the eyes have turned yellow (i.e., jaundice)   Negative: Blood in urine (red, pink, or tea-colored)   Negative: Fever > 103 F (39.4 C)   Negative: Fever > 101 F (38.3 C) and over 60 years of age   Negative: Fever > 100.0 F (37.8 C) and has diabetes mellitus or a weak immune system (e.g., HIV positive, cancer chemotherapy, organ transplant, splenectomy, chronic steroids)   Negative: Fever > 100.0 F (37.8 C) and bedridden (e.g., nursing home patient, stroke, chronic illness, recovering from surgery)   Negative: Pregnant or could be pregnant (i.e., missed last menstrual period)   Negative: MODERATE OR MILD pain that comes and goes (cramps) lasts > 24 hours   Negative: Unusual vaginal discharge   Negative: Age > 60 years   Negative: Patient wants to be seen   Negative: Abdominal pain is a chronic symptom (recurrent " or ongoing AND lasting > 4 weeks)   Negative: Pain with sexual intercourse (dyspareunia)   Negative: Mild abdominal pain    Protocols used: ABDOMINAL PAIN - FEMALE-A-OH

## 2020-04-08 NOTE — ED NOTES
Pt AAOX3 NAD, resp even nl Skin PWD, pt reports NVD, right sided rib pain, SOB, low grade temp for 2 weeks. BBSCTA, busted blister noted to right FA no active drainage. CMS intact x4 Abdomen soft, NT ND, PERRLA.

## 2020-04-09 NOTE — ED PROVIDER NOTES
Encounter Date: 2020       History     Chief Complaint   Patient presents with    Shortness of Breath     nausea and diarrhea , abdominal pain     45-year-old female past medical history significant of anemia, ADHD, CVA, bipolar disorder and anxiety presents with shortness of breath.  Patient states her symptoms have progressively gotten worse over the past few days.  She has also had muscle aches with subjective fevers at this time.  Patient denies any sick contacts.  She also denies any chest pain, chills, sweats, or productive cough.  She is otherwise stable and has no other complaints.        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    Endometriosis     Headache     Heartburn     Hypotension     Infertility, female     Insomnia     Migraine headache     PTSD (post-traumatic stress disorder)     Trauma      Past Surgical History:   Procedure Laterality Date    APPENDECTOMY      BLADDER SUSPENSION      CERVICAL FUSION       SECTION      CHOLECYSTECTOMY      CYSTOSCOPY WITH BIOPSY OF BLADDER N/A 2018    Procedure: CYSTOSCOPY, WITH BLADDER BIOPSY, WITH FULGURATION IF INDICATED;  Surgeon: Luna Slater MD;  Location: [facility] OR;  Service: Urology;  Laterality: N/A;    CYSTOSCOPY WITH HYDRODISTENSION OF BLADDER N/A 12/3/2019    Procedure: CYSTOSCOPY, WITH BLADDER HYDRODISTENSION;  Surgeon: Gumaro Mcgovern MD;  Location: [facility] OR;  Service: OB/GYN;  Laterality: N/A;    DILATION OF URETHRA N/A 2018     Procedure: DILATION, URETHRA;  Surgeon: Luna Slater MD;  Location: [facility];  Service: Urology;  Laterality: N/A;    HYSTERECTOMY      KNEE SURGERY Bilateral     TIBIA FRACTURE SURGERY Right     tibia/ fibula repaired with pins and screws    TOE SURGERY Left      Family History   Problem Relation Age of Onset    Hypertension Father     Hypertension Mother     Stroke Mother      Social History     Tobacco Use    Smoking status: Current Every Day Smoker     Packs/day: 1.00     Types: Cigarettes, Vaping with nicotine    Smokeless tobacco: Never Used   Substance Use Topics    Alcohol use: No     Frequency: Never    Drug use: Yes     Types: Marijuana     Comment: last dose last week.     Review of Systems   Constitutional: Negative for fever.   HENT: Negative for sore throat.    Respiratory: Positive for shortness of breath.    Cardiovascular: Negative for chest pain.   Gastrointestinal: Positive for diarrhea, nausea and vomiting.   Genitourinary: Negative for dysuria.   Musculoskeletal: Negative for back pain.   Skin: Negative for rash.   Neurological: Negative for weakness.   Hematological: Does not bruise/bleed easily.   All other systems reviewed and are negative.      Physical Exam     Initial Vitals [04/08/20 1733]   BP Pulse Resp Temp SpO2   (!) 123/56 96 20 98.1 °F (36.7 °C) (!) 94 %      MAP       --         Physical Exam    Nursing note and vitals reviewed.  Constitutional: She appears well-developed and well-nourished. She appears distressed.   HENT:   Head: Normocephalic and atraumatic.   Mouth/Throat: Oropharynx is clear and moist.   Eyes: Conjunctivae and EOM are normal. Pupils are equal, round, and reactive to light.   Neck: Normal range of motion. No tracheal deviation present. No JVD present.   Cardiovascular: Normal rate, regular rhythm, normal heart sounds and intact distal pulses. Exam reveals no gallop and no friction rub.    No murmur heard.  Pulmonary/Chest: Breath sounds  normal. No respiratory distress. She has no wheezes. She exhibits no tenderness.   Abdominal: Soft. Bowel sounds are normal. She exhibits no distension. There is no tenderness. There is no rebound and no guarding.   Musculoskeletal: Normal range of motion. She exhibits no edema or tenderness.   Neurological: She is alert and oriented to person, place, and time. She has normal strength. No cranial nerve deficit or sensory deficit. GCS score is 15. GCS eye subscore is 4. GCS verbal subscore is 5. GCS motor subscore is 6.   Skin: Skin is warm and dry. Capillary refill takes less than 2 seconds. No erythema.   Psychiatric: She has a normal mood and affect.         ED Course   Procedures  Labs Reviewed   COMPREHENSIVE METABOLIC PANEL - Abnormal; Notable for the following components:       Result Value    Potassium 3.2 (*)     Glucose 136 (*)     BUN, Bld 21 (*)     All other components within normal limits   LACTIC ACID, PLASMA - Abnormal; Notable for the following components:    Lactate (Lactic Acid) 2.6 (*)     All other components within normal limits    Narrative:      Lactic Acid critical result(s) repeated. Called and verbal readback   obtained from Charmaine Hwang Rn/ER by EBONIE 04/08/2020 19:51   CULTURE, BLOOD   CULTURE, BLOOD   CBC W/ AUTO DIFFERENTIAL   TROPONIN I   B-TYPE NATRIURETIC PEPTIDE   LACTATE DEHYDROGENASE   CK   SARS-COV-2 RNA AMPLIFICATION, QUAL    Narrative:     What symptom criteria does the patient meet?->Cough   C-REACTIVE PROTEIN   FERRITIN   URINALYSIS, REFLEX TO URINE CULTURE    Narrative:     Preferred Collection Type->Urine, Clean Catch  Specimen Source->Urine   LIPASE   LIPASE   LIPASE          Imaging Results          X-Ray Chest AP Portable (Final result)  Result time 04/08/20 18:50:01    Final result by Chetan Reeder MD (04/08/20 18:50:01)                 Impression:      Normal chest single view.      Electronically signed by: Chetan Reeder  MD  Date:    04/08/2020  Time:    18:50             Narrative:    EXAMINATION:  XR CHEST AP PORTABLE    CLINICAL HISTORY:  Shortness of breath    COMPARISON:  July 2019    FINDINGS:  AP view of the chest demonstrates no cardiac, pulmonary, or osseous abnormalities.                                 Medical Decision Making:   Initial Assessment:   45-year-old female initial assessment in mild distress secondary to shortness of breath.  Patient is alert and oriented x3, neurologically and neurovascular intact no focal deficits.  She is nontoxic-appearing and vitals are stable at this time.  Differential Diagnosis:   UTI, sepsis, pneumonia, COVID-19, PE  Independently Interpreted Test(s):   I have ordered and independently interpreted X-rays - see prior notes.  I have ordered and independently interpreted EKG Reading(s) - see prior notes  Clinical Tests:   Lab Tests: Ordered and Reviewed  The following lab test(s) were unremarkable: CBC, CMP, Urinalysis, Lactate, Troponin and BNP  Radiological Study: Ordered and Reviewed  Medical Tests: Ordered and Reviewed  ED Management:  Patient has been reassessed noted to have no acute changes in her condition.  Labs unremarkable for any acute pathology requiring hospital admission at this time.  Patient reports symptomatic improvement after given pain control and IV fluids while in the ED. Labs negative for COVID-19 and all inflammatory markers a negative as well at this time.  Patient has been given precautions of when to return to emergency room if any point if symptoms become progressively worse.  She has remained stable while in the ED be discharged home stable condition with PCP follow-up as needed.  Ms. Jacobo is aware of the plan and in agreement.                                 Clinical Impression:       ICD-10-CM ICD-9-CM   1. Upper respiratory tract infection, unspecified type J06.9 465.9   2. SOB (shortness of breath) R06.02 786.05   3. Flu-like symptoms R68.89 780.99              ED Disposition Condition    Discharge Stable        ED Prescriptions     None        Follow-up Information     Follow up With Specialties Details Why Contact Info Additional Information    [facility] Emergency Medicine  As needed, If symptoms worsen [address] [facility] [id number] [phone] 1st floor    Alberto Fairchild MD Internal Medicine Schedule an appointment as soon as possible for a visit in 3 days As needed [address]  [address]  [facility] [address]  [phone]                                        Gary Godfrey MD  04/08/20 [id number]

## 2020-04-09 NOTE — ED NOTES
Spoke with Dr. Thomas regarding lab values that have returned, Will continue discharge process per dr. Godfrey

## 2020-04-13 LAB
BACTERIA BLD CULT: NORMAL
BACTERIA BLD CULT: NORMAL

## 2020-06-02 ENCOUNTER — LAB VISIT (OUTPATIENT)
Dept: LAB | Facility: HOSPITAL | Age: 45
End: 2020-06-02
Attending: EMERGENCY MEDICINE
Payer: MEDICARE

## 2020-06-02 DIAGNOSIS — R07.9 CHEST PAIN, UNSPECIFIED: Primary | ICD-10-CM

## 2020-06-02 LAB
CK SERPL-CCNC: 85 U/L (ref 20–180)
TROPONIN I SERPL DL<=0.01 NG/ML-MCNC: <0.03 NG/ML

## 2020-06-02 PROCEDURE — 36415 COLL VENOUS BLD VENIPUNCTURE: CPT

## 2020-06-02 PROCEDURE — 84484 ASSAY OF TROPONIN QUANT: CPT

## 2020-06-02 PROCEDURE — 82550 ASSAY OF CK (CPK): CPT

## 2020-11-30 NOTE — DISCHARGE SUMMARY
Patient Education     Coronavirus Disease 2019 (COVID-19): Caring for Yourself or Others  If you or a household member have symptoms of COVID-19, follow the guidelines below for preventing spread of the virus, and managing symptoms.  If you think you have COVID-19 symptoms  · Stay home. Call your healthcare provider and tell them you have symptoms of COVID-19. Do this before going to any hospital or clinic. Follow your provider's instructions. You may be advised to isolate yourself at home. This is called self-isolation.  · Don’t panic. Keep in mind that other illnesses can cause similar symptoms.  · Stay away from work, school, and public places. Limit physical contact with family members. Limit visitors. Don't kiss anyone or share eating or drinking utensils. Clean surfaces you touch with disinfectant. This is to help prevent the virus from spreading.  · If you need to cough or sneeze, do it into a tissue. Then throw the tissue into the trash. If you don't have tissues, cough or sneeze into the bend of your elbow.  · Don’t share food or personal items with people in your household. This includes items like eating and drinking utensils, towels, and bedding.  · Wear a cloth face mask around other people. During a public health emergency, medical face masks may be reserved for healthcare workers. You may need to make a cloth face mask of your own. You can do this using a bandana, T-shirt, or other cloth. The CDC has instructions on how to make a mask. Wear the mask so that it covers both your nose and mouth.  · If you need to go to a hospital or clinic, expect that the healthcare staff will wear protective equipment such as masks, gowns, gloves, and eye protection. You may be put in a separate room. This is to prevent the possible virus from spreading.  · Tell the healthcare staff about recent travel. This includes local travel on public transport. Staff may need to find other people you have been in contact  Patient presented for elective hydrodistention of bladder secondary to refractory pain thought to be from [place] see patient per obtained relief in the past from such distension that reason we went forward with this.  Patient tolerated procedure well uncomplicated no gross of no abnormality seen within the bladder evidence of chronic inflammation which was present previously by provider I personally did not see need for biopsy.  Patient as stated tolerated procedure well no blood loss patient knows to follow up with us in the next week or 2 for possibly commencement of bladder installations   with.  · Follow all instructions the healthcare staff give you.    If you have been diagnosed with COVID-19  · Stay home and start self-isolation. Don’t leave your home unless you need to get medical care. Don't go to work, school, or public areas. Don't use public transportation or taxis.  · Follow all instructions from your healthcare provider. Call your healthcare provider’s office before going. They can prepare and give you instructions. This will help prevent the virus from spreading.  · If you need to go to a hospital or clinic, expect that the healthcare staff will wear protective equipment such as masks, gowns, gloves, and eye protection. You may be put in a separate room. This is to prevent the possible virus from spreading.  · Wear a face mask. This is to protect other people from your germs. If you are not able to wear a mask, your caregivers should. During a public health emergency, medical face masks may be reserved for healthcare workers. You may need to make a cloth face mask of your own. You can do this using a bandana, T-shirt, or other cloth. The [facility] has instructions on how to make a mask. Wear the mask so that it covers both your nose and mouth.  · Stay away from other people in your home.  · Have no contact with pets and animals.  · Don’t share food or personal items with people in your household. This includes items like eating and drinking utensils, towels, and bedding.  · If you need to cough or sneeze, do it into a tissue. Then throw the tissue into the trash. If you don't have tissues, cough or sneeze into the bend of your elbow.  · Wash your hands often.    Self-care at home   There is currently no medicine approved to prevent or treat the virus. Some experimental and other medicines are being tested against COVID-19. Other medicines used to treat other conditions are being looked at for COVID-19, but they are not currently approved to treat it.  Current treatment is mainly aimed at helping  your body while it fights the virus. This is known as supportive care. Take care of yourself at home by:  · Getting rest. This helps your body fight the illness.  · Staying hydrated.  Drinking liquids is the best way to prevent dehydration. Try to drink 6 to 8 glasses of liquids every day, or as advised by your provider. Also check with your provider about which fluids are best for you. Don't drink fluids that contain caffeine or alcohol.  · Taking over-the-counter (OTC) pain medicine. These are used to help ease pain and reduce fever. Follow your healthcare provider's instructions for which OTC medicine to use.  If you've been in the hospital for suspected or confirmed COVID-19 and now are home, follow all of your healthcare team's instructions. This will include when it's OK to stop self-isolation. You may also get instructions on position changes to help your breathing, such as lying on your belly (prone positioning).  If you've had confirmed COVID-19, your healthcare team may ask you to consider donating your plasma. This is called COVID-19 convalescent plasma donation. Plasma from people fully recovered from COVID-19 may contain antibodies to help fight COVID-19 in people who are currently seriously ill with the disease. It's not fully known if the donated plasma will work well as a treatment, but the FDA is looking at it and has asked the [employer] to help with plasma donation and collection.  Caring for a sick person   · Follow all instructions from healthcare staff.  · Wash your hands often.  · Wear protective clothing as advised.  · Make sure the sick person wears a mask. If they can't wear a mask, don't stay in the same room with the person. If you must be in the same room, wear a face mask. When wearing a mask, make sure that it covers both the nose and mouth.  · Keep track of the sick person’s symptoms.  · Clean home surfaces often with disinfectant. This includes phones, kitchen counters,  fridge door handle, bathroom surfaces, and others.  · Don’t let anyone share household items with the sick person. This includes eating and drinking tools, towels, sheets, or blankets.  · Clean fabrics and laundry thoroughly.  · Keep other people and pets away from the sick person.    When you can stop self-isolation  When you are sick with COVID-19, you should stay away from other people. This is called self-isolation.  If you are normally healthy, you can stop self-isolation when all 3 of these are true:  1. You have had no fever for at least 72 hours. This means no fever without medicine that reduces fever, such as acetaminophen, for at least 72 hours.  2. Your symptoms are better, such as cough or trouble breathing.  3. It has been at least 10 days since your first symptoms started.  Talk with your healthcare provider before you leave home. Tell him or her if the 3 things above are true for you. He or she may tell you it’s OK to leave home. In some cases, your state or local area may have specific advice. Your healthcare provider will tell you more.   If you have a weak immune system and COVID-19, your instructions on when to stop isolation will be somewhat different. Some conditions and treatments can cause a weak immune system. These include cancer treatment, bone marrow or organ transplants, and conditions such as HIV or other immune system disorders. Follow your healthcare provider's instructions on how to isolate and when it's OK to stop. You likely will be told to stay in home isolation until all 3 of these are true:  1. You have no fever without fever-reducing medicines.  2. Your breathing symptoms such as cough and shortness of breath have improved.  3. You have 2 negative COVID-19 nose-throat swabs that were collected at least 24 hours apart. If no tests are available, your healthcare provider will likely tell you to follow the isolation instructions for normally healthy people. Follow your provider's  instructions on isolation and when it's OK to stop.  When you return to public settings  When you are well enough to go outside your home, consider the CDC's guidance on cloth face masks:     · The CDC advises all people over age 2 to wear cloth face masks in public settings when around people outside of their household, especially when it's hard to socially distance. For example, wear a face mask in populated places such as public transit, public protests and marches, and crowded stores, bars, and restaurants.  · Cloth masks may help prevent people who have COVID-19 form spreading the virus to others.  · Cloth masks are most likely to reduce COVID-19 spread when masks are widely used by people who are out in the public.     Certain people should not wear a face covering. This includes:  · Children younger than 2 years old  · Anyone with a health, developmental, or mental health condition that can be made worse by wearing a mask  · Anyone who is unconscious or unable to remove the face covering without help. See the CDC's guidance on who should not wear a face mask.     When to call your healthcare provider  Call your healthcare provider right away if a sick person has any of these:  · Trouble breathing  · Pain or pressure in chest  If a sick person has any of these, call 911:  · Trouble breathing that gets worse  · Pain or pressure in chest that gets worse  · Blue tint to lips or face  · Fast or irregular heartbeat  · Confusion or trouble waking  · Fainting or loss of consciousness  · Coughing up blood  Going home from the hospital  If you were diagnosed with COVID-19 and were recently discharged from the hospital:  · Follow the instructions above for self-care and isolation.  · Follow the hospital healthcare team’s specific instructions.  · Ask questions if anything is unclear to you. Write down answers so you remember them.  Date last modified: 6/29/2020  [employer] last reviewed this educational content on  4/1/2020 © 2000-2019 [employer], [address] [place] [address], [place] [address]. All rights reserved. This information is not intended as a substitute for professional medical care. Always follow your healthcare professional's instructions. This information has been modified by your health care provider with permission from the publisher.

## 2021-01-06 ENCOUNTER — HOSPITAL ENCOUNTER (EMERGENCY)
Facility: HOSPITAL | Age: 46
Discharge: HOME | End: 2021-01-07
Attending: EMERGENCY MEDICINE
Payer: MEDICARE

## 2021-01-06 VITALS
DIASTOLIC BLOOD PRESSURE: 66 MMHG | SYSTOLIC BLOOD PRESSURE: 112 MMHG | RESPIRATION RATE: 20 BRPM | HEART RATE: 91 BPM | OXYGEN SATURATION: 97 % | HEIGHT: 66 IN | BODY MASS INDEX: 27.32 KG/M2 | TEMPERATURE: 98 F | WEIGHT: 170 LBS

## 2021-01-06 DIAGNOSIS — R52 PAIN: ICD-10-CM

## 2021-01-06 DIAGNOSIS — M79.675 PAIN OF TOE OF LEFT FOOT: Primary | ICD-10-CM

## 2021-01-06 LAB
B-HCG UR QL: NEGATIVE
CTP QC/QA: YES

## 2021-01-06 PROCEDURE — 99283 EMERGENCY DEPT VISIT LOW MDM: CPT | Mod: 25

## 2021-01-06 PROCEDURE — 81025 URINE PREGNANCY TEST: CPT | Performed by: EMERGENCY MEDICINE

## 2021-01-06 RX ORDER — NAPROXEN 500 MG/1
500 TABLET ORAL 2 TIMES DAILY WITH MEALS
Qty: 20 TABLET | Refills: 0 | Status: ON HOLD | OUTPATIENT
Start: 2021-01-06 | End: 2021-08-16 | Stop reason: HOSPADM

## 2021-02-17 ENCOUNTER — HOSPITAL ENCOUNTER (OUTPATIENT)
Facility: HOSPITAL | Age: 46
Discharge: PSYCHIATRIC OR SUBSTANCE USE FACILITY | End: 2021-02-22
Attending: NURSE PRACTITIONER | Admitting: FAMILY MEDICINE
Payer: MEDICARE

## 2021-02-17 DIAGNOSIS — J45.20 MILD INTERMITTENT ASTHMA WITHOUT COMPLICATION: ICD-10-CM

## 2021-02-17 DIAGNOSIS — M51.36 DDD (DEGENERATIVE DISC DISEASE), LUMBAR: ICD-10-CM

## 2021-02-17 DIAGNOSIS — T40.601A OPIATE OVERDOSE, ACCIDENTAL OR UNINTENTIONAL, INITIAL ENCOUNTER: Primary | ICD-10-CM

## 2021-02-17 DIAGNOSIS — N94.10 DYSPAREUNIA, FEMALE: ICD-10-CM

## 2021-02-17 DIAGNOSIS — R10.2 PELVIC PAIN: ICD-10-CM

## 2021-02-17 DIAGNOSIS — F32.A DEPRESSION, UNSPECIFIED DEPRESSION TYPE: ICD-10-CM

## 2021-02-17 DIAGNOSIS — R35.0 URINARY FREQUENCY: ICD-10-CM

## 2021-02-17 DIAGNOSIS — T42.4X1A BENZODIAZEPINE OVERDOSE, ACCIDENTAL OR UNINTENTIONAL, INITIAL ENCOUNTER: ICD-10-CM

## 2021-02-17 DIAGNOSIS — T40.602A INTENTIONAL OPIATE OVERDOSE, INITIAL ENCOUNTER: ICD-10-CM

## 2021-02-17 DIAGNOSIS — M47.816 LUMBAR SPONDYLOSIS: ICD-10-CM

## 2021-02-17 DIAGNOSIS — T50.902A INTENTIONAL DRUG OVERDOSE, INITIAL ENCOUNTER: ICD-10-CM

## 2021-02-17 DIAGNOSIS — T50.901A OVERDOSE: ICD-10-CM

## 2021-02-17 LAB
ALBUMIN SERPL BCP-MCNC: 4.3 G/DL (ref 3.5–5.2)
ALLENS TEST: ABNORMAL
ALLENS TEST: ABNORMAL
ALP SERPL-CCNC: 85 U/L (ref 55–135)
ALT SERPL W/O P-5'-P-CCNC: 36 U/L (ref 10–44)
AMPHET+METHAMPHET UR QL: NORMAL
ANION GAP SERPL CALC-SCNC: 14 MMOL/L (ref 8–16)
APAP SERPL-MCNC: <10 UG/ML (ref 10–20)
AST SERPL-CCNC: 22 U/L (ref 10–40)
BARBITURATES UR QL SCN>200 NG/ML: NEGATIVE
BASOPHILS # BLD AUTO: 0.06 K/UL (ref 0–0.2)
BASOPHILS NFR BLD: 0.7 % (ref 0–1.9)
BENZODIAZ UR QL SCN>200 NG/ML: NORMAL
BILIRUB SERPL-MCNC: 0.9 MG/DL (ref 0.1–1)
BILIRUB UR QL STRIP: NEGATIVE
BUN SERPL-MCNC: 18 MG/DL (ref 6–20)
BZE UR QL SCN: NEGATIVE
CALCIUM SERPL-MCNC: 9.3 MG/DL (ref 8.7–10.5)
CANNABINOIDS UR QL SCN: NEGATIVE
CHLORIDE SERPL-SCNC: 105 MMOL/L (ref 95–110)
CLARITY UR: CLEAR
CO2 SERPL-SCNC: 21 MMOL/L (ref 23–29)
COLOR UR: YELLOW
CPAPPEEP: ABNORMAL
CREAT SERPL-MCNC: 0.7 MG/DL (ref 0.5–1.4)
CREAT UR-MCNC: 45.4 MG/DL (ref 15–325)
DELSYS: ABNORMAL
DIFFERENTIAL METHOD: ABNORMAL
EOSINOPHIL # BLD AUTO: 0.2 K/UL (ref 0–0.5)
EOSINOPHIL NFR BLD: 1.8 % (ref 0–8)
ERYTHROCYTE [DISTWIDTH] IN BLOOD BY AUTOMATED COUNT: 12.9 % (ref 11.5–14.5)
ERYTHROCYTE [SEDIMENTATION RATE] IN BLOOD BY WESTERGREN METHOD: ABNORMAL MM/H
EST. GFR  (AFRICAN AMERICAN): >60 ML/MIN/1.73 M^2
EST. GFR  (NON AFRICAN AMERICAN): >60 ML/MIN/1.73 M^2
ETHANOL SERPL-MCNC: <5 MG/DL
FIO2: 21
GLUCOSE SERPL-MCNC: 108 MG/DL (ref 70–110)
GLUCOSE UR QL STRIP: NEGATIVE
HCO3 UR-SCNC: 24 MMOL/L (ref 24–28)
HCO3 UR-SCNC: 25.9 MMOL/L (ref 22–26)
HCT VFR BLD AUTO: 41.3 % (ref 37–48.5)
HGB BLD-MCNC: 13.7 G/DL (ref 12–16)
HGB UR QL STRIP: ABNORMAL
IMM GRANULOCYTES # BLD AUTO: 0.03 K/UL (ref 0–0.04)
IMM GRANULOCYTES NFR BLD AUTO: 0.4 % (ref 0–0.5)
KETONES UR QL STRIP: ABNORMAL
LEUKOCYTE ESTERASE UR QL STRIP: NEGATIVE
LYMPHOCYTES # BLD AUTO: 1.9 K/UL (ref 1–4.8)
LYMPHOCYTES NFR BLD: 22.7 % (ref 18–48)
MCH RBC QN AUTO: 29 PG (ref 27–31)
MCHC RBC AUTO-ENTMCNC: 33.2 G/DL (ref 32–36)
MCV RBC AUTO: 87 FL (ref 82–98)
MODE: ABNORMAL
MODE: ABNORMAL
MONOCYTES # BLD AUTO: 0.5 K/UL (ref 0.3–1)
MONOCYTES NFR BLD: 5.4 % (ref 4–15)
NEUTROPHILS # BLD AUTO: 5.9 K/UL (ref 1.8–7.7)
NEUTROPHILS NFR BLD: 69 % (ref 38–73)
NITRITE UR QL STRIP: NEGATIVE
NRBC BLD-RTO: 0 /100 WBC
O2DEVICE: ABNORMAL
OPIATES UR QL SCN: NEGATIVE
PCO2 BLDA: 42.4 MMHG (ref 35–45)
PCO2 BLDA: 47.2 MMHG (ref 35–45)
PCP UR QL SCN>25 NG/ML: NEGATIVE
PH SMN: 7.35 [PH] (ref 7.35–7.45)
PH SMN: 7.36 [PH] (ref 7.35–7.45)
PH UR STRIP: 6 [PH] (ref 5–8)
PIP: ABNORMAL
PLATELET # BLD AUTO: 392 K/UL (ref 150–350)
PMV BLD AUTO: 8.9 FL (ref 9.2–12.9)
PO2 BLDA: 54 MMHG (ref 75–100)
PO2 BLDA: 71 MMHG (ref 80–100)
POC BE: -1 MMOL/L
POC BE: 0 MMOL/L (ref -2–2)
POC FIO2: ABNORMAL
POC FLOW: 2
POC O2 PERCENT: 28 %
POC SATURATED O2: 86 % (ref 90–100)
POC SATURATED O2: 93 % (ref 95–100)
POC TCO2: 25 MMOL/L (ref 23–27)
POC TCO2: 27 MMOL/L (ref 22–28)
POC TEMPERATURE: 37 C
POTASSIUM SERPL-SCNC: 3.4 MMOL/L (ref 3.5–5.1)
PRESSURE SUPPORT: ABNORMAL
PROT SERPL-MCNC: 8 G/DL (ref 6–8.4)
PROT UR QL STRIP: NEGATIVE
RBC # BLD AUTO: 4.73 M/UL (ref 4–5.4)
SALICYLATES SERPL-MCNC: <4 MG/DL (ref 15–30)
SAMPLE: ABNORMAL
SARS-COV-2 RDRP RESP QL NAA+PROBE: NEGATIVE
SITE: ABNORMAL
SITE: ABNORMAL
SODIUM SERPL-SCNC: 140 MMOL/L (ref 136–145)
SP GR UR STRIP: 1.01 (ref 1–1.03)
TOXICOLOGY INFORMATION: NORMAL
TSH SERPL DL<=0.005 MIU/L-ACNC: 1.87 UIU/ML (ref 0.34–5.6)
URN SPEC COLLECT METH UR: ABNORMAL
UROBILINOGEN UR STRIP-ACNC: NEGATIVE EU/DL
VT: ABNORMAL
WBC # BLD AUTO: 8.56 K/UL (ref 3.9–12.7)

## 2021-02-17 PROCEDURE — 80307 DRUG TEST PRSMV CHEM ANLYZR: CPT

## 2021-02-17 PROCEDURE — 63600175 PHARM REV CODE 636 W HCPCS: Performed by: INTERNAL MEDICINE

## 2021-02-17 PROCEDURE — 82375 ASSAY CARBOXYHB QUANT: CPT

## 2021-02-17 PROCEDURE — 70450 CT HEAD/BRAIN W/O DYE: CPT | Mod: 26,,, | Performed by: RADIOLOGY

## 2021-02-17 PROCEDURE — 71045 X-RAY EXAM CHEST 1 VIEW: CPT | Mod: TC,FY

## 2021-02-17 PROCEDURE — 85025 COMPLETE CBC W/AUTO DIFF WBC: CPT

## 2021-02-17 PROCEDURE — 80143 DRUG ASSAY ACETAMINOPHEN: CPT

## 2021-02-17 PROCEDURE — 84443 ASSAY THYROID STIM HORMONE: CPT

## 2021-02-17 PROCEDURE — 70450 CT HEAD WITHOUT CONTRAST: ICD-10-PCS | Mod: 26,,, | Performed by: RADIOLOGY

## 2021-02-17 PROCEDURE — 99285 EMERGENCY DEPT VISIT HI MDM: CPT | Mod: 25

## 2021-02-17 PROCEDURE — 96376 TX/PRO/DX INJ SAME DRUG ADON: CPT

## 2021-02-17 PROCEDURE — G0378 HOSPITAL OBSERVATION PER HR: HCPCS

## 2021-02-17 PROCEDURE — 99900035 HC TECH TIME PER 15 MIN (STAT)

## 2021-02-17 PROCEDURE — 25000003 PHARM REV CODE 250: Performed by: EMERGENCY MEDICINE

## 2021-02-17 PROCEDURE — 71045 X-RAY EXAM CHEST 1 VIEW: CPT | Mod: 26,,, | Performed by: RADIOLOGY

## 2021-02-17 PROCEDURE — 71045 XR CHEST AP PORTABLE: ICD-10-PCS | Mod: 26,,, | Performed by: RADIOLOGY

## 2021-02-17 PROCEDURE — 93005 ELECTROCARDIOGRAM TRACING: CPT

## 2021-02-17 PROCEDURE — 80179 DRUG ASSAY SALICYLATE: CPT

## 2021-02-17 PROCEDURE — 80053 COMPREHEN METABOLIC PANEL: CPT

## 2021-02-17 PROCEDURE — U0002 COVID-19 LAB TEST NON-CDC: HCPCS

## 2021-02-17 PROCEDURE — 63600175 PHARM REV CODE 636 W HCPCS: Performed by: EMERGENCY MEDICINE

## 2021-02-17 PROCEDURE — 82077 ASSAY SPEC XCP UR&BREATH IA: CPT

## 2021-02-17 PROCEDURE — 94761 N-INVAS EAR/PLS OXIMETRY MLT: CPT

## 2021-02-17 PROCEDURE — 70450 CT HEAD/BRAIN W/O DYE: CPT | Mod: TC

## 2021-02-17 PROCEDURE — 96374 THER/PROPH/DIAG INJ IV PUSH: CPT

## 2021-02-17 PROCEDURE — 82803 BLOOD GASES ANY COMBINATION: CPT

## 2021-02-17 PROCEDURE — 27000221 HC OXYGEN, UP TO 24 HOURS

## 2021-02-17 PROCEDURE — 81003 URINALYSIS AUTO W/O SCOPE: CPT | Mod: 59

## 2021-02-17 PROCEDURE — 36600 WITHDRAWAL OF ARTERIAL BLOOD: CPT

## 2021-02-17 RX ORDER — NALOXONE HCL 0.4 MG/ML
VIAL (ML) INJECTION
Status: COMPLETED
Start: 2021-02-17 | End: 2021-02-17

## 2021-02-17 RX ORDER — SODIUM CHLORIDE 9 MG/ML
INJECTION, SOLUTION INTRAVENOUS
Status: COMPLETED | OUTPATIENT
Start: 2021-02-17 | End: 2021-02-17

## 2021-02-17 RX ORDER — NALOXONE HCL 0.4 MG/ML
0.4 VIAL (ML) INJECTION
Status: COMPLETED | OUTPATIENT
Start: 2021-02-17 | End: 2021-02-17

## 2021-02-17 RX ORDER — OXYCODONE AND ACETAMINOPHEN 10; 325 MG/1; MG/1
1 TABLET ORAL EVERY 4 HOURS PRN
Status: DISCONTINUED | OUTPATIENT
Start: 2021-02-17 | End: 2021-02-22 | Stop reason: HOSPADM

## 2021-02-17 RX ORDER — MIRTAZAPINE 7.5 MG/1
45 TABLET, FILM COATED ORAL NIGHTLY
Status: DISCONTINUED | OUTPATIENT
Start: 2021-02-17 | End: 2021-02-22 | Stop reason: HOSPADM

## 2021-02-17 RX ORDER — SODIUM CHLORIDE 0.9 % (FLUSH) 0.9 %
10 SYRINGE (ML) INJECTION
Status: DISCONTINUED | OUTPATIENT
Start: 2021-02-17 | End: 2021-02-22 | Stop reason: HOSPADM

## 2021-02-17 RX ORDER — DIAZEPAM 5 MG/1
10 TABLET ORAL 3 TIMES DAILY PRN
Status: DISCONTINUED | OUTPATIENT
Start: 2021-02-17 | End: 2021-02-20

## 2021-02-17 RX ORDER — FLUMAZENIL 0.1 MG/ML
INJECTION INTRAVENOUS
Status: DISCONTINUED
Start: 2021-02-17 | End: 2021-02-17 | Stop reason: WASHOUT

## 2021-02-17 RX ADMIN — SODIUM CHLORIDE: 0.9 INJECTION, SOLUTION INTRAVENOUS at 06:02

## 2021-02-17 RX ADMIN — NALOXONE HYDROCHLORIDE 0.4 MG: 0.4 INJECTION, SOLUTION INTRAMUSCULAR; INTRAVENOUS; SUBCUTANEOUS at 06:02

## 2021-02-17 RX ADMIN — NALOXONE HYDROCHLORIDE 0.4 MG: 0.4 INJECTION, SOLUTION INTRAMUSCULAR; INTRAVENOUS; SUBCUTANEOUS at 04:02

## 2021-02-18 PROBLEM — F32.A DEPRESSION: Status: ACTIVE | Noted: 2021-02-18

## 2021-02-18 LAB
ALBUMIN SERPL BCP-MCNC: 3.9 G/DL (ref 3.5–5.2)
ALP SERPL-CCNC: 80 U/L (ref 55–135)
ALT SERPL W/O P-5'-P-CCNC: 28 U/L (ref 10–44)
ANION GAP SERPL CALC-SCNC: 11 MMOL/L (ref 8–16)
AST SERPL-CCNC: 17 U/L (ref 10–40)
BASOPHILS # BLD AUTO: 0.07 K/UL (ref 0–0.2)
BASOPHILS NFR BLD: 0.9 % (ref 0–1.9)
BILIRUB SERPL-MCNC: 0.4 MG/DL (ref 0.1–1)
BUN SERPL-MCNC: 21 MG/DL (ref 6–20)
CALCIUM SERPL-MCNC: 8.8 MG/DL (ref 8.7–10.5)
CHLORIDE SERPL-SCNC: 108 MMOL/L (ref 95–110)
CO2 SERPL-SCNC: 23 MMOL/L (ref 23–29)
CREAT SERPL-MCNC: 0.7 MG/DL (ref 0.5–1.4)
DIFFERENTIAL METHOD: ABNORMAL
EOSINOPHIL # BLD AUTO: 0.2 K/UL (ref 0–0.5)
EOSINOPHIL NFR BLD: 2.1 % (ref 0–8)
ERYTHROCYTE [DISTWIDTH] IN BLOOD BY AUTOMATED COUNT: 12.9 % (ref 11.5–14.5)
EST. GFR  (AFRICAN AMERICAN): >60 ML/MIN/1.73 M^2
EST. GFR  (NON AFRICAN AMERICAN): >60 ML/MIN/1.73 M^2
GLUCOSE SERPL-MCNC: 96 MG/DL (ref 70–110)
HCT VFR BLD AUTO: 39.6 % (ref 37–48.5)
HGB BLD-MCNC: 13.1 G/DL (ref 12–16)
IMM GRANULOCYTES # BLD AUTO: 0.02 K/UL (ref 0–0.04)
IMM GRANULOCYTES NFR BLD AUTO: 0.3 % (ref 0–0.5)
LYMPHOCYTES # BLD AUTO: 1.8 K/UL (ref 1–4.8)
LYMPHOCYTES NFR BLD: 23.8 % (ref 18–48)
MCH RBC QN AUTO: 29 PG (ref 27–31)
MCHC RBC AUTO-ENTMCNC: 33.1 G/DL (ref 32–36)
MCV RBC AUTO: 88 FL (ref 82–98)
MONOCYTES # BLD AUTO: 0.4 K/UL (ref 0.3–1)
MONOCYTES NFR BLD: 5.4 % (ref 4–15)
NEUTROPHILS # BLD AUTO: 5.2 K/UL (ref 1.8–7.7)
NEUTROPHILS NFR BLD: 67.5 % (ref 38–73)
NRBC BLD-RTO: 0 /100 WBC
PLATELET # BLD AUTO: 384 K/UL (ref 150–350)
PMV BLD AUTO: 9.2 FL (ref 9.2–12.9)
POTASSIUM SERPL-SCNC: 3.5 MMOL/L (ref 3.5–5.1)
PROT SERPL-MCNC: 7 G/DL (ref 6–8.4)
RBC # BLD AUTO: 4.52 M/UL (ref 4–5.4)
SODIUM SERPL-SCNC: 142 MMOL/L (ref 136–145)
WBC # BLD AUTO: 7.74 K/UL (ref 3.9–12.7)

## 2021-02-18 PROCEDURE — 85025 COMPLETE CBC W/AUTO DIFF WBC: CPT

## 2021-02-18 PROCEDURE — 36415 COLL VENOUS BLD VENIPUNCTURE: CPT

## 2021-02-18 PROCEDURE — 99225 PR SUBSEQUENT OBSERVATION CARE,LEVEL II: ICD-10-PCS | Mod: ,,, | Performed by: FAMILY MEDICINE

## 2021-02-18 PROCEDURE — 80053 COMPREHEN METABOLIC PANEL: CPT

## 2021-02-18 PROCEDURE — G0378 HOSPITAL OBSERVATION PER HR: HCPCS

## 2021-02-18 PROCEDURE — 25000003 PHARM REV CODE 250: Performed by: FAMILY MEDICINE

## 2021-02-18 PROCEDURE — 99225 PR SUBSEQUENT OBSERVATION CARE,LEVEL II: CPT | Mod: ,,, | Performed by: FAMILY MEDICINE

## 2021-02-18 RX ADMIN — MIRTAZAPINE 45 MG: 7.5 TABLET ORAL at 11:02

## 2021-02-18 RX ADMIN — OXYCODONE HYDROCHLORIDE AND ACETAMINOPHEN 1 TABLET: 10; 325 TABLET ORAL at 11:02

## 2021-02-18 RX ADMIN — OXYCODONE HYDROCHLORIDE AND ACETAMINOPHEN 1 TABLET: 10; 325 TABLET ORAL at 10:02

## 2021-02-19 LAB
ALBUMIN SERPL BCP-MCNC: 3.7 G/DL (ref 3.5–5.2)
ALP SERPL-CCNC: 69 U/L (ref 55–135)
ALT SERPL W/O P-5'-P-CCNC: 23 U/L (ref 10–44)
ANION GAP SERPL CALC-SCNC: 7 MMOL/L (ref 8–16)
AST SERPL-CCNC: 17 U/L (ref 10–40)
BASOPHILS # BLD AUTO: 0.06 K/UL (ref 0–0.2)
BASOPHILS NFR BLD: 0.9 % (ref 0–1.9)
BILIRUB SERPL-MCNC: 0.4 MG/DL (ref 0.1–1)
BUN SERPL-MCNC: 14 MG/DL (ref 6–20)
CALCIUM SERPL-MCNC: 8.7 MG/DL (ref 8.7–10.5)
CHLORIDE SERPL-SCNC: 107 MMOL/L (ref 95–110)
CO2 SERPL-SCNC: 26 MMOL/L (ref 23–29)
CREAT SERPL-MCNC: 0.8 MG/DL (ref 0.5–1.4)
DIFFERENTIAL METHOD: ABNORMAL
EOSINOPHIL # BLD AUTO: 0.2 K/UL (ref 0–0.5)
EOSINOPHIL NFR BLD: 3 % (ref 0–8)
ERYTHROCYTE [DISTWIDTH] IN BLOOD BY AUTOMATED COUNT: 12.7 % (ref 11.5–14.5)
EST. GFR  (AFRICAN AMERICAN): >60 ML/MIN/1.73 M^2
EST. GFR  (NON AFRICAN AMERICAN): >60 ML/MIN/1.73 M^2
GLUCOSE SERPL-MCNC: 90 MG/DL (ref 70–110)
HCT VFR BLD AUTO: 38.3 % (ref 37–48.5)
HGB BLD-MCNC: 12.5 G/DL (ref 12–16)
IMM GRANULOCYTES # BLD AUTO: 0.01 K/UL (ref 0–0.04)
IMM GRANULOCYTES NFR BLD AUTO: 0.2 % (ref 0–0.5)
LYMPHOCYTES # BLD AUTO: 2.8 K/UL (ref 1–4.8)
LYMPHOCYTES NFR BLD: 43 % (ref 18–48)
MCH RBC QN AUTO: 28.7 PG (ref 27–31)
MCHC RBC AUTO-ENTMCNC: 32.6 G/DL (ref 32–36)
MCV RBC AUTO: 88 FL (ref 82–98)
MONOCYTES # BLD AUTO: 0.4 K/UL (ref 0.3–1)
MONOCYTES NFR BLD: 5.3 % (ref 4–15)
NEUTROPHILS # BLD AUTO: 3.2 K/UL (ref 1.8–7.7)
NEUTROPHILS NFR BLD: 47.6 % (ref 38–73)
NRBC BLD-RTO: 0 /100 WBC
PLATELET # BLD AUTO: 412 K/UL (ref 150–350)
PMV BLD AUTO: 9.4 FL (ref 9.2–12.9)
POTASSIUM SERPL-SCNC: 3.5 MMOL/L (ref 3.5–5.1)
PROT SERPL-MCNC: 6.7 G/DL (ref 6–8.4)
RBC # BLD AUTO: 4.36 M/UL (ref 4–5.4)
SODIUM SERPL-SCNC: 140 MMOL/L (ref 136–145)
WBC # BLD AUTO: 6.61 K/UL (ref 3.9–12.7)

## 2021-02-19 PROCEDURE — 80053 COMPREHEN METABOLIC PANEL: CPT

## 2021-02-19 PROCEDURE — 85025 COMPLETE CBC W/AUTO DIFF WBC: CPT

## 2021-02-19 PROCEDURE — 36415 COLL VENOUS BLD VENIPUNCTURE: CPT

## 2021-02-19 PROCEDURE — G0378 HOSPITAL OBSERVATION PER HR: HCPCS | Mod: CS

## 2021-02-19 PROCEDURE — 25000003 PHARM REV CODE 250: Performed by: FAMILY MEDICINE

## 2021-02-19 RX ADMIN — DIAZEPAM 10 MG: 5 TABLET ORAL at 12:02

## 2021-02-19 RX ADMIN — OXYCODONE HYDROCHLORIDE AND ACETAMINOPHEN 1 TABLET: 10; 325 TABLET ORAL at 03:02

## 2021-02-20 PROBLEM — T40.601A OPIATE OVERDOSE: Status: ACTIVE | Noted: 2021-02-20

## 2021-02-20 LAB
ALBUMIN SERPL BCP-MCNC: 3.7 G/DL (ref 3.5–5.2)
ALP SERPL-CCNC: 73 U/L (ref 55–135)
ALT SERPL W/O P-5'-P-CCNC: 20 U/L (ref 10–44)
ANION GAP SERPL CALC-SCNC: 10 MMOL/L (ref 8–16)
AST SERPL-CCNC: 15 U/L (ref 10–40)
BASOPHILS # BLD AUTO: 0.07 K/UL (ref 0–0.2)
BASOPHILS NFR BLD: 1 % (ref 0–1.9)
BILIRUB SERPL-MCNC: 0.2 MG/DL (ref 0.1–1)
BUN SERPL-MCNC: 16 MG/DL (ref 6–20)
CALCIUM SERPL-MCNC: 9.2 MG/DL (ref 8.7–10.5)
CHLORIDE SERPL-SCNC: 107 MMOL/L (ref 95–110)
CO2 SERPL-SCNC: 25 MMOL/L (ref 23–29)
COHGB MFR BLD: 2 %
CREAT SERPL-MCNC: 0.8 MG/DL (ref 0.5–1.4)
DIFFERENTIAL METHOD: ABNORMAL
EOSINOPHIL # BLD AUTO: 0.4 K/UL (ref 0–0.5)
EOSINOPHIL NFR BLD: 5.3 % (ref 0–8)
ERYTHROCYTE [DISTWIDTH] IN BLOOD BY AUTOMATED COUNT: 12.6 % (ref 11.5–14.5)
EST. GFR  (AFRICAN AMERICAN): >60 ML/MIN/1.73 M^2
EST. GFR  (NON AFRICAN AMERICAN): >60 ML/MIN/1.73 M^2
GLUCOSE SERPL-MCNC: 93 MG/DL (ref 70–110)
HCT VFR BLD AUTO: 39.6 % (ref 37–48.5)
HGB BLD-MCNC: 13 G/DL (ref 12–16)
IMM GRANULOCYTES # BLD AUTO: 0.01 K/UL (ref 0–0.04)
IMM GRANULOCYTES NFR BLD AUTO: 0.1 % (ref 0–0.5)
LYMPHOCYTES # BLD AUTO: 2.8 K/UL (ref 1–4.8)
LYMPHOCYTES NFR BLD: 39.2 % (ref 18–48)
MCH RBC QN AUTO: 28.8 PG (ref 27–31)
MCHC RBC AUTO-ENTMCNC: 32.8 G/DL (ref 32–36)
MCV RBC AUTO: 88 FL (ref 82–98)
MONOCYTES # BLD AUTO: 0.4 K/UL (ref 0.3–1)
MONOCYTES NFR BLD: 5.3 % (ref 4–15)
NEUTROPHILS # BLD AUTO: 3.4 K/UL (ref 1.8–7.7)
NEUTROPHILS NFR BLD: 49.1 % (ref 38–73)
NRBC BLD-RTO: 0 /100 WBC
PLATELET # BLD AUTO: 389 K/UL (ref 150–350)
PMV BLD AUTO: 9.4 FL (ref 9.2–12.9)
POTASSIUM SERPL-SCNC: 3.8 MMOL/L (ref 3.5–5.1)
PROT SERPL-MCNC: 6.6 G/DL (ref 6–8.4)
RBC # BLD AUTO: 4.51 M/UL (ref 4–5.4)
SODIUM SERPL-SCNC: 142 MMOL/L (ref 136–145)
WBC # BLD AUTO: 7.01 K/UL (ref 3.9–12.7)

## 2021-02-20 PROCEDURE — 36415 COLL VENOUS BLD VENIPUNCTURE: CPT

## 2021-02-20 PROCEDURE — 96372 THER/PROPH/DIAG INJ SC/IM: CPT

## 2021-02-20 PROCEDURE — G0378 HOSPITAL OBSERVATION PER HR: HCPCS

## 2021-02-20 PROCEDURE — 25000003 PHARM REV CODE 250: Performed by: FAMILY MEDICINE

## 2021-02-20 PROCEDURE — 25000003 PHARM REV CODE 250: Performed by: HOSPITALIST

## 2021-02-20 PROCEDURE — S0166 INJ OLANZAPINE 2.5MG: HCPCS | Performed by: HOSPITALIST

## 2021-02-20 PROCEDURE — 85025 COMPLETE CBC W/AUTO DIFF WBC: CPT

## 2021-02-20 PROCEDURE — 80053 COMPREHEN METABOLIC PANEL: CPT

## 2021-02-20 RX ORDER — ALPRAZOLAM 0.25 MG/1
0.5 TABLET ORAL 2 TIMES DAILY
Status: DISCONTINUED | OUTPATIENT
Start: 2021-02-20 | End: 2021-02-22 | Stop reason: HOSPADM

## 2021-02-20 RX ORDER — OLANZAPINE 5 MG/1
10 TABLET, ORALLY DISINTEGRATING ORAL NIGHTLY
Status: DISCONTINUED | OUTPATIENT
Start: 2021-02-20 | End: 2021-02-22 | Stop reason: HOSPADM

## 2021-02-20 RX ORDER — OLANZAPINE 10 MG/2ML
10 INJECTION, POWDER, FOR SOLUTION INTRAMUSCULAR EVERY 6 HOURS PRN
Status: COMPLETED | OUTPATIENT
Start: 2021-02-20 | End: 2021-02-22

## 2021-02-20 RX ADMIN — OLANZAPINE 10 MG: 5 TABLET, ORALLY DISINTEGRATING ORAL at 09:02

## 2021-02-20 RX ADMIN — OLANZAPINE 10 MG: 10 INJECTION, POWDER, LYOPHILIZED, FOR SOLUTION INTRAMUSCULAR at 01:02

## 2021-02-20 RX ADMIN — OXYCODONE HYDROCHLORIDE AND ACETAMINOPHEN 1 TABLET: 10; 325 TABLET ORAL at 02:02

## 2021-02-20 RX ADMIN — MIRTAZAPINE 45 MG: 7.5 TABLET ORAL at 09:02

## 2021-02-20 RX ADMIN — DIAZEPAM 10 MG: 5 TABLET ORAL at 11:02

## 2021-02-20 RX ADMIN — ALPRAZOLAM 0.5 MG: 0.25 TABLET ORAL at 02:02

## 2021-02-21 LAB
ALBUMIN SERPL BCP-MCNC: 3.6 G/DL (ref 3.5–5.2)
ALP SERPL-CCNC: 76 U/L (ref 55–135)
ALT SERPL W/O P-5'-P-CCNC: 20 U/L (ref 10–44)
ANION GAP SERPL CALC-SCNC: 10 MMOL/L (ref 8–16)
AST SERPL-CCNC: 18 U/L (ref 10–40)
BASOPHILS # BLD AUTO: 0.06 K/UL (ref 0–0.2)
BASOPHILS NFR BLD: 1 % (ref 0–1.9)
BILIRUB SERPL-MCNC: 0.5 MG/DL (ref 0.1–1)
BUN SERPL-MCNC: 13 MG/DL (ref 6–20)
CALCIUM SERPL-MCNC: 9 MG/DL (ref 8.7–10.5)
CHLORIDE SERPL-SCNC: 109 MMOL/L (ref 95–110)
CO2 SERPL-SCNC: 25 MMOL/L (ref 23–29)
CREAT SERPL-MCNC: 0.8 MG/DL (ref 0.5–1.4)
DIFFERENTIAL METHOD: ABNORMAL
EOSINOPHIL # BLD AUTO: 0.4 K/UL (ref 0–0.5)
EOSINOPHIL NFR BLD: 6 % (ref 0–8)
ERYTHROCYTE [DISTWIDTH] IN BLOOD BY AUTOMATED COUNT: 12.9 % (ref 11.5–14.5)
EST. GFR  (AFRICAN AMERICAN): >60 ML/MIN/1.73 M^2
EST. GFR  (NON AFRICAN AMERICAN): >60 ML/MIN/1.73 M^2
GLUCOSE SERPL-MCNC: 133 MG/DL (ref 70–110)
HCT VFR BLD AUTO: 45.4 % (ref 37–48.5)
HGB BLD-MCNC: 13.8 G/DL (ref 12–16)
IMM GRANULOCYTES # BLD AUTO: 0.01 K/UL (ref 0–0.04)
IMM GRANULOCYTES NFR BLD AUTO: 0.2 % (ref 0–0.5)
LYMPHOCYTES # BLD AUTO: 2.6 K/UL (ref 1–4.8)
LYMPHOCYTES NFR BLD: 42.4 % (ref 18–48)
MCH RBC QN AUTO: 29.4 PG (ref 27–31)
MCHC RBC AUTO-ENTMCNC: 30.4 G/DL (ref 32–36)
MCV RBC AUTO: 97 FL (ref 82–98)
MONOCYTES # BLD AUTO: 0.4 K/UL (ref 0.3–1)
MONOCYTES NFR BLD: 6.3 % (ref 4–15)
NEUTROPHILS # BLD AUTO: 2.7 K/UL (ref 1.8–7.7)
NEUTROPHILS NFR BLD: 44.1 % (ref 38–73)
NRBC BLD-RTO: 0 /100 WBC
PLATELET # BLD AUTO: 227 K/UL (ref 150–350)
PMV BLD AUTO: 9.7 FL (ref 9.2–12.9)
POTASSIUM SERPL-SCNC: 3.1 MMOL/L (ref 3.5–5.1)
PROT SERPL-MCNC: 6.3 G/DL (ref 6–8.4)
RBC # BLD AUTO: 4.69 M/UL (ref 4–5.4)
SODIUM SERPL-SCNC: 144 MMOL/L (ref 136–145)
WBC # BLD AUTO: 6.04 K/UL (ref 3.9–12.7)

## 2021-02-21 PROCEDURE — G0378 HOSPITAL OBSERVATION PER HR: HCPCS

## 2021-02-21 PROCEDURE — 25000003 PHARM REV CODE 250: Performed by: HOSPITALIST

## 2021-02-21 PROCEDURE — 80053 COMPREHEN METABOLIC PANEL: CPT

## 2021-02-21 PROCEDURE — 25000003 PHARM REV CODE 250: Performed by: FAMILY MEDICINE

## 2021-02-21 PROCEDURE — 85025 COMPLETE CBC W/AUTO DIFF WBC: CPT

## 2021-02-21 RX ADMIN — ALPRAZOLAM 0.5 MG: 0.25 TABLET ORAL at 09:02

## 2021-02-21 RX ADMIN — OXYCODONE HYDROCHLORIDE AND ACETAMINOPHEN 1 TABLET: 10; 325 TABLET ORAL at 09:02

## 2021-02-21 RX ADMIN — OXYCODONE HYDROCHLORIDE AND ACETAMINOPHEN 1 TABLET: 10; 325 TABLET ORAL at 12:02

## 2021-02-21 RX ADMIN — MIRTAZAPINE 45 MG: 7.5 TABLET ORAL at 09:02

## 2021-02-21 RX ADMIN — OXYCODONE HYDROCHLORIDE AND ACETAMINOPHEN 1 TABLET: 10; 325 TABLET ORAL at 05:02

## 2021-02-21 RX ADMIN — OLANZAPINE 10 MG: 5 TABLET, ORALLY DISINTEGRATING ORAL at 09:02

## 2021-02-22 VITALS
WEIGHT: 168.81 LBS | BODY MASS INDEX: 28.82 KG/M2 | SYSTOLIC BLOOD PRESSURE: 89 MMHG | RESPIRATION RATE: 22 BRPM | DIASTOLIC BLOOD PRESSURE: 62 MMHG | TEMPERATURE: 98 F | HEART RATE: 83 BPM | HEIGHT: 64 IN | OXYGEN SATURATION: 94 %

## 2021-02-22 LAB
ALBUMIN SERPL BCP-MCNC: 3.7 G/DL (ref 3.5–5.2)
ALP SERPL-CCNC: 79 U/L (ref 55–135)
ALT SERPL W/O P-5'-P-CCNC: 20 U/L (ref 10–44)
ANION GAP SERPL CALC-SCNC: 9 MMOL/L (ref 8–16)
AST SERPL-CCNC: 21 U/L (ref 10–40)
BASOPHILS # BLD AUTO: 0.06 K/UL (ref 0–0.2)
BASOPHILS NFR BLD: 1 % (ref 0–1.9)
BILIRUB SERPL-MCNC: 0.4 MG/DL (ref 0.1–1)
BUN SERPL-MCNC: 13 MG/DL (ref 6–20)
CALCIUM SERPL-MCNC: 9.1 MG/DL (ref 8.7–10.5)
CHLORIDE SERPL-SCNC: 107 MMOL/L (ref 95–110)
CO2 SERPL-SCNC: 25 MMOL/L (ref 23–29)
CREAT SERPL-MCNC: 0.8 MG/DL (ref 0.5–1.4)
DIFFERENTIAL METHOD: ABNORMAL
EOSINOPHIL # BLD AUTO: 0.4 K/UL (ref 0–0.5)
EOSINOPHIL NFR BLD: 5.8 % (ref 0–8)
ERYTHROCYTE [DISTWIDTH] IN BLOOD BY AUTOMATED COUNT: 12.4 % (ref 11.5–14.5)
EST. GFR  (AFRICAN AMERICAN): >60 ML/MIN/1.73 M^2
EST. GFR  (NON AFRICAN AMERICAN): >60 ML/MIN/1.73 M^2
GLUCOSE SERPL-MCNC: 171 MG/DL (ref 70–110)
HCT VFR BLD AUTO: 41.7 % (ref 37–48.5)
HGB BLD-MCNC: 13.7 G/DL (ref 12–16)
IMM GRANULOCYTES # BLD AUTO: 0.02 K/UL (ref 0–0.04)
IMM GRANULOCYTES NFR BLD AUTO: 0.3 % (ref 0–0.5)
LYMPHOCYTES # BLD AUTO: 2 K/UL (ref 1–4.8)
LYMPHOCYTES NFR BLD: 33.1 % (ref 18–48)
MCH RBC QN AUTO: 29.1 PG (ref 27–31)
MCHC RBC AUTO-ENTMCNC: 32.9 G/DL (ref 32–36)
MCV RBC AUTO: 89 FL (ref 82–98)
MONOCYTES # BLD AUTO: 0.4 K/UL (ref 0.3–1)
MONOCYTES NFR BLD: 5.8 % (ref 4–15)
NEUTROPHILS # BLD AUTO: 3.2 K/UL (ref 1.8–7.7)
NEUTROPHILS NFR BLD: 54 % (ref 38–73)
NRBC BLD-RTO: 0 /100 WBC
PLATELET # BLD AUTO: 392 K/UL (ref 150–350)
PMV BLD AUTO: 8.9 FL (ref 9.2–12.9)
POTASSIUM SERPL-SCNC: 4 MMOL/L (ref 3.5–5.1)
PROT SERPL-MCNC: 6.6 G/DL (ref 6–8.4)
RBC # BLD AUTO: 4.7 M/UL (ref 4–5.4)
SODIUM SERPL-SCNC: 141 MMOL/L (ref 136–145)
WBC # BLD AUTO: 6.01 K/UL (ref 3.9–12.7)

## 2021-02-22 PROCEDURE — G0378 HOSPITAL OBSERVATION PER HR: HCPCS | Mod: CS

## 2021-02-22 PROCEDURE — 85025 COMPLETE CBC W/AUTO DIFF WBC: CPT

## 2021-02-22 PROCEDURE — S0166 INJ OLANZAPINE 2.5MG: HCPCS | Performed by: HOSPITALIST

## 2021-02-22 PROCEDURE — 80053 COMPREHEN METABOLIC PANEL: CPT

## 2021-02-22 PROCEDURE — 25000003 PHARM REV CODE 250: Performed by: HOSPITALIST

## 2021-02-22 PROCEDURE — 25000003 PHARM REV CODE 250: Performed by: FAMILY MEDICINE

## 2021-02-22 PROCEDURE — 96372 THER/PROPH/DIAG INJ SC/IM: CPT

## 2021-02-22 RX ADMIN — OLANZAPINE 10 MG: 10 INJECTION, POWDER, LYOPHILIZED, FOR SOLUTION INTRAMUSCULAR at 11:02

## 2021-02-22 RX ADMIN — ALPRAZOLAM 0.5 MG: 0.25 TABLET ORAL at 08:02

## 2021-02-22 RX ADMIN — OXYCODONE HYDROCHLORIDE AND ACETAMINOPHEN 1 TABLET: 10; 325 TABLET ORAL at 08:02

## 2021-02-22 RX ADMIN — OXYCODONE HYDROCHLORIDE AND ACETAMINOPHEN 1 TABLET: 10; 325 TABLET ORAL at 03:02

## 2021-05-06 ENCOUNTER — PATIENT MESSAGE (OUTPATIENT)
Dept: RESEARCH | Facility: HOSPITAL | Age: 46
End: 2021-05-06

## 2021-07-20 ENCOUNTER — HOSPITAL ENCOUNTER (OUTPATIENT)
Dept: RADIOLOGY | Facility: HOSPITAL | Age: 46
Discharge: HOME | End: 2021-07-20
Attending: EMERGENCY MEDICINE
Payer: MEDICARE

## 2021-07-20 DIAGNOSIS — M25.522 LEFT ELBOW PAIN: ICD-10-CM

## 2021-07-20 DIAGNOSIS — M25.522 LEFT ELBOW PAIN: Primary | ICD-10-CM

## 2021-07-20 PROCEDURE — 73080 X-RAY EXAM OF ELBOW: CPT | Mod: TC,PO,LT

## 2021-08-15 ENCOUNTER — HOSPITAL ENCOUNTER (OUTPATIENT)
Facility: HOSPITAL | Age: 46
Discharge: HOME | End: 2021-08-16
Attending: EMERGENCY MEDICINE | Admitting: INTERNAL MEDICINE
Payer: MEDICARE

## 2021-08-15 DIAGNOSIS — R07.9 CHEST PAIN: ICD-10-CM

## 2021-08-15 DIAGNOSIS — K52.9 COLITIS: Primary | ICD-10-CM

## 2021-08-15 DIAGNOSIS — R42 DIZZINESS: ICD-10-CM

## 2021-08-15 LAB
ALBUMIN SERPL BCP-MCNC: 4 G/DL (ref 3.5–5.2)
ALP SERPL-CCNC: 75 U/L (ref 55–135)
ALT SERPL W/O P-5'-P-CCNC: 19 U/L (ref 10–44)
AMPHET+METHAMPHET UR QL: ABNORMAL
ANION GAP SERPL CALC-SCNC: 8 MMOL/L (ref 8–16)
APAP SERPL-MCNC: <10 UG/ML (ref 10–20)
AST SERPL-CCNC: 21 U/L (ref 10–40)
BACTERIA #/AREA URNS HPF: ABNORMAL /HPF
BARBITURATES UR QL SCN>200 NG/ML: NEGATIVE
BASOPHILS # BLD AUTO: 0.03 K/UL (ref 0–0.2)
BASOPHILS NFR BLD: 0.4 % (ref 0–1.9)
BENZODIAZ UR QL SCN>200 NG/ML: ABNORMAL
BILIRUB SERPL-MCNC: 0.4 MG/DL (ref 0.1–1)
BILIRUB UR QL STRIP: NEGATIVE
BUN SERPL-MCNC: 13 MG/DL (ref 6–20)
BZE UR QL SCN: NEGATIVE
CALCIUM SERPL-MCNC: 9.3 MG/DL (ref 8.7–10.5)
CANNABINOIDS UR QL SCN: NEGATIVE
CHLORIDE SERPL-SCNC: 106 MMOL/L (ref 95–110)
CK MB SERPL-MCNC: 1.2 NG/ML (ref 0.1–6.5)
CK SERPL-CCNC: 44 U/L (ref 20–180)
CLARITY UR: ABNORMAL
CO2 SERPL-SCNC: 28 MMOL/L (ref 23–29)
COLOR UR: YELLOW
CREAT SERPL-MCNC: 0.9 MG/DL (ref 0.5–1.4)
CREAT UR-MCNC: 85 MG/DL (ref 15–325)
DIFFERENTIAL METHOD: ABNORMAL
EOSINOPHIL # BLD AUTO: 0.3 K/UL (ref 0–0.5)
EOSINOPHIL NFR BLD: 3.8 % (ref 0–8)
ERYTHROCYTE [DISTWIDTH] IN BLOOD BY AUTOMATED COUNT: 12.8 % (ref 11.5–14.5)
EST. GFR  (AFRICAN AMERICAN): >60 ML/MIN/1.73 M^2
EST. GFR  (NON AFRICAN AMERICAN): >60 ML/MIN/1.73 M^2
ETHANOL SERPL-MCNC: <5 MG/DL
GLUCOSE SERPL-MCNC: 101 MG/DL (ref 70–110)
GLUCOSE UR QL STRIP: NEGATIVE
HCT VFR BLD AUTO: 44 % (ref 37–48.5)
HGB BLD-MCNC: 14.7 G/DL (ref 12–16)
HGB UR QL STRIP: NEGATIVE
HYALINE CASTS #/AREA URNS LPF: 15 /LPF
IMM GRANULOCYTES # BLD AUTO: 0.02 K/UL (ref 0–0.04)
IMM GRANULOCYTES NFR BLD AUTO: 0.3 % (ref 0–0.5)
KETONES UR QL STRIP: NEGATIVE
LACTATE SERPL-SCNC: 1.2 MMOL/L (ref 0.5–1.9)
LEUKOCYTE ESTERASE UR QL STRIP: ABNORMAL
LIPASE SERPL-CCNC: 44 U/L (ref 4–60)
LYMPHOCYTES # BLD AUTO: 3.1 K/UL (ref 1–4.8)
LYMPHOCYTES NFR BLD: 43 % (ref 18–48)
MAGNESIUM SERPL-MCNC: 2 MG/DL (ref 1.6–2.6)
MCH RBC QN AUTO: 28.8 PG (ref 27–31)
MCHC RBC AUTO-ENTMCNC: 33.4 G/DL (ref 32–36)
MCV RBC AUTO: 86 FL (ref 82–98)
MICROSCOPIC COMMENT: ABNORMAL
MONOCYTES # BLD AUTO: 0.5 K/UL (ref 0.3–1)
MONOCYTES NFR BLD: 6.8 % (ref 4–15)
NEUTROPHILS # BLD AUTO: 3.3 K/UL (ref 1.8–7.7)
NEUTROPHILS NFR BLD: 45.7 % (ref 38–73)
NITRITE UR QL STRIP: POSITIVE
NRBC BLD-RTO: 0 /100 WBC
OPIATES UR QL SCN: NEGATIVE
PCP UR QL SCN>25 NG/ML: NEGATIVE
PH UR STRIP: 6 [PH] (ref 5–8)
PLATELET # BLD AUTO: 355 K/UL (ref 150–450)
PMV BLD AUTO: 8.9 FL (ref 9.2–12.9)
POTASSIUM SERPL-SCNC: 3.8 MMOL/L (ref 3.5–5.1)
PROT SERPL-MCNC: 7.1 G/DL (ref 6–8.4)
PROT UR QL STRIP: NEGATIVE
RBC # BLD AUTO: 5.1 M/UL (ref 4–5.4)
RBC #/AREA URNS HPF: 1 /HPF (ref 0–4)
SALICYLATES SERPL-MCNC: <4 MG/DL (ref 15–30)
SARS-COV-2 RDRP RESP QL NAA+PROBE: NEGATIVE
SODIUM SERPL-SCNC: 142 MMOL/L (ref 136–145)
SP GR UR STRIP: 1.01 (ref 1–1.03)
SQUAMOUS #/AREA URNS HPF: 6 /HPF
TOXICOLOGY INFORMATION: ABNORMAL
TROPONIN I SERPL DL<=0.01 NG/ML-MCNC: <0.03 NG/ML
URN SPEC COLLECT METH UR: ABNORMAL
UROBILINOGEN UR STRIP-ACNC: NEGATIVE EU/DL
WBC # BLD AUTO: 7.1 K/UL (ref 3.9–12.7)
WBC #/AREA URNS HPF: 13 /HPF (ref 0–5)

## 2021-08-15 PROCEDURE — 93005 ELECTROCARDIOGRAM TRACING: CPT | Performed by: INTERNAL MEDICINE

## 2021-08-15 PROCEDURE — 83735 ASSAY OF MAGNESIUM: CPT | Performed by: NURSE PRACTITIONER

## 2021-08-15 PROCEDURE — 84484 ASSAY OF TROPONIN QUANT: CPT | Performed by: NURSE PRACTITIONER

## 2021-08-15 PROCEDURE — 81001 URINALYSIS AUTO W/SCOPE: CPT | Performed by: NURSE PRACTITIONER

## 2021-08-15 PROCEDURE — 87186 SC STD MICRODIL/AGAR DIL: CPT | Performed by: NURSE PRACTITIONER

## 2021-08-15 PROCEDURE — 93010 ELECTROCARDIOGRAM REPORT: CPT | Mod: ,,, | Performed by: INTERNAL MEDICINE

## 2021-08-15 PROCEDURE — 87040 BLOOD CULTURE FOR BACTERIA: CPT | Performed by: EMERGENCY MEDICINE

## 2021-08-15 PROCEDURE — 96375 TX/PRO/DX INJ NEW DRUG ADDON: CPT

## 2021-08-15 PROCEDURE — 80143 DRUG ASSAY ACETAMINOPHEN: CPT | Performed by: NURSE PRACTITIONER

## 2021-08-15 PROCEDURE — 82553 CREATINE MB FRACTION: CPT | Performed by: NURSE PRACTITIONER

## 2021-08-15 PROCEDURE — 87077 CULTURE AEROBIC IDENTIFY: CPT | Performed by: NURSE PRACTITIONER

## 2021-08-15 PROCEDURE — 25500020 PHARM REV CODE 255: Performed by: EMERGENCY MEDICINE

## 2021-08-15 PROCEDURE — U0002 COVID-19 LAB TEST NON-CDC: HCPCS | Performed by: NURSE PRACTITIONER

## 2021-08-15 PROCEDURE — 93010 EKG 12-LEAD: ICD-10-PCS | Mod: ,,, | Performed by: INTERNAL MEDICINE

## 2021-08-15 PROCEDURE — 96365 THER/PROPH/DIAG IV INF INIT: CPT

## 2021-08-15 PROCEDURE — 36415 COLL VENOUS BLD VENIPUNCTURE: CPT | Performed by: NURSE PRACTITIONER

## 2021-08-15 PROCEDURE — 83690 ASSAY OF LIPASE: CPT | Performed by: NURSE PRACTITIONER

## 2021-08-15 PROCEDURE — 82077 ASSAY SPEC XCP UR&BREATH IA: CPT | Performed by: NURSE PRACTITIONER

## 2021-08-15 PROCEDURE — 82550 ASSAY OF CK (CPK): CPT | Performed by: NURSE PRACTITIONER

## 2021-08-15 PROCEDURE — 25000003 PHARM REV CODE 250: Performed by: EMERGENCY MEDICINE

## 2021-08-15 PROCEDURE — 87086 URINE CULTURE/COLONY COUNT: CPT | Performed by: NURSE PRACTITIONER

## 2021-08-15 PROCEDURE — 87491 CHLMYD TRACH DNA AMP PROBE: CPT | Performed by: EMERGENCY MEDICINE

## 2021-08-15 PROCEDURE — 87591 N.GONORRHOEAE DNA AMP PROB: CPT | Performed by: EMERGENCY MEDICINE

## 2021-08-15 PROCEDURE — 99285 EMERGENCY DEPT VISIT HI MDM: CPT | Mod: 25

## 2021-08-15 PROCEDURE — 63600175 PHARM REV CODE 636 W HCPCS: Performed by: EMERGENCY MEDICINE

## 2021-08-15 PROCEDURE — 96361 HYDRATE IV INFUSION ADD-ON: CPT

## 2021-08-15 PROCEDURE — 80053 COMPREHEN METABOLIC PANEL: CPT | Performed by: NURSE PRACTITIONER

## 2021-08-15 PROCEDURE — 80179 DRUG ASSAY SALICYLATE: CPT | Performed by: NURSE PRACTITIONER

## 2021-08-15 PROCEDURE — 80307 DRUG TEST PRSMV CHEM ANLYZR: CPT | Performed by: EMERGENCY MEDICINE

## 2021-08-15 PROCEDURE — 83605 ASSAY OF LACTIC ACID: CPT | Performed by: EMERGENCY MEDICINE

## 2021-08-15 PROCEDURE — 85025 COMPLETE CBC W/AUTO DIFF WBC: CPT | Performed by: NURSE PRACTITIONER

## 2021-08-15 RX ORDER — ONDANSETRON 2 MG/ML
4 INJECTION INTRAMUSCULAR; INTRAVENOUS
Status: COMPLETED | OUTPATIENT
Start: 2021-08-15 | End: 2021-08-15

## 2021-08-15 RX ORDER — FAMOTIDINE 10 MG/ML
20 INJECTION INTRAVENOUS
Status: COMPLETED | OUTPATIENT
Start: 2021-08-15 | End: 2021-08-15

## 2021-08-15 RX ORDER — DROPERIDOL 2.5 MG/ML
2.5 INJECTION, SOLUTION INTRAMUSCULAR; INTRAVENOUS ONCE
Status: COMPLETED | OUTPATIENT
Start: 2021-08-15 | End: 2021-08-15

## 2021-08-15 RX ORDER — LEVOFLOXACIN 5 MG/ML
500 INJECTION, SOLUTION INTRAVENOUS
Status: COMPLETED | OUTPATIENT
Start: 2021-08-15 | End: 2021-08-16

## 2021-08-15 RX ORDER — METRONIDAZOLE 500 MG/100ML
500 INJECTION, SOLUTION INTRAVENOUS
Status: COMPLETED | OUTPATIENT
Start: 2021-08-15 | End: 2021-08-16

## 2021-08-15 RX ORDER — HYOSCYAMINE SULFATE 0.5 MG/ML
0.12 INJECTION, SOLUTION SUBCUTANEOUS
Status: COMPLETED | OUTPATIENT
Start: 2021-08-15 | End: 2021-08-15

## 2021-08-15 RX ORDER — MORPHINE SULFATE 4 MG/ML
4 INJECTION, SOLUTION INTRAMUSCULAR; INTRAVENOUS
Status: DISCONTINUED | OUTPATIENT
Start: 2021-08-15 | End: 2021-08-16

## 2021-08-15 RX ADMIN — ONDANSETRON 4 MG: 2 INJECTION INTRAMUSCULAR; INTRAVENOUS at 08:08

## 2021-08-15 RX ADMIN — DROPERIDOL 2.5 MG: 2.5 INJECTION, SOLUTION INTRAMUSCULAR; INTRAVENOUS at 11:08

## 2021-08-15 RX ADMIN — FAMOTIDINE 20 MG: 10 INJECTION INTRAVENOUS at 08:08

## 2021-08-15 RX ADMIN — CEFTRIAXONE 1 G: 1 INJECTION, SOLUTION INTRAVENOUS at 11:08

## 2021-08-15 RX ADMIN — IOHEXOL 100 ML: 350 INJECTION, SOLUTION INTRAVENOUS at 09:08

## 2021-08-15 RX ADMIN — SODIUM CHLORIDE 1000 ML: 0.9 INJECTION, SOLUTION INTRAVENOUS at 11:08

## 2021-08-15 RX ADMIN — HYOSCYAMINE SULFATE 0.12 MG: 0.5 INJECTION, SOLUTION SUBCUTANEOUS at 08:08

## 2021-08-15 RX ADMIN — SODIUM CHLORIDE 1000 ML: 0.9 INJECTION, SOLUTION INTRAVENOUS at 08:08

## 2021-08-16 VITALS
RESPIRATION RATE: 16 BRPM | HEART RATE: 59 BPM | WEIGHT: 171.06 LBS | HEIGHT: 64 IN | DIASTOLIC BLOOD PRESSURE: 74 MMHG | SYSTOLIC BLOOD PRESSURE: 101 MMHG | OXYGEN SATURATION: 97 % | TEMPERATURE: 98 F | BODY MASS INDEX: 29.2 KG/M2

## 2021-08-16 PROBLEM — K52.9 COLITIS: Status: ACTIVE | Noted: 2021-08-16

## 2021-08-16 LAB
ALBUMIN SERPL BCP-MCNC: 3.3 G/DL (ref 3.5–5.2)
ALP SERPL-CCNC: 53 U/L (ref 55–135)
ALT SERPL W/O P-5'-P-CCNC: 16 U/L (ref 10–44)
ANION GAP SERPL CALC-SCNC: 8 MMOL/L (ref 8–16)
AST SERPL-CCNC: 16 U/L (ref 10–40)
BASOPHILS # BLD AUTO: 0.03 K/UL (ref 0–0.2)
BASOPHILS NFR BLD: 0.5 % (ref 0–1.9)
BILIRUB SERPL-MCNC: 0.4 MG/DL (ref 0.1–1)
BUN SERPL-MCNC: 9 MG/DL (ref 6–20)
CALCIUM SERPL-MCNC: 8.1 MG/DL (ref 8.7–10.5)
CHLORIDE SERPL-SCNC: 111 MMOL/L (ref 95–110)
CHOLEST SERPL-MCNC: 174 MG/DL (ref 120–199)
CHOLEST/HDLC SERPL: 4.8 {RATIO} (ref 2–5)
CO2 SERPL-SCNC: 25 MMOL/L (ref 23–29)
CREAT SERPL-MCNC: 0.8 MG/DL (ref 0.5–1.4)
DIFFERENTIAL METHOD: NORMAL
EOSINOPHIL # BLD AUTO: 0.3 K/UL (ref 0–0.5)
EOSINOPHIL NFR BLD: 4.4 % (ref 0–8)
ERYTHROCYTE [DISTWIDTH] IN BLOOD BY AUTOMATED COUNT: 12.9 % (ref 11.5–14.5)
EST. GFR  (AFRICAN AMERICAN): >60 ML/MIN/1.73 M^2
EST. GFR  (NON AFRICAN AMERICAN): >60 ML/MIN/1.73 M^2
ESTIMATED AVG GLUCOSE: 117 MG/DL (ref 68–131)
GLUCOSE SERPL-MCNC: 86 MG/DL (ref 70–110)
HBA1C MFR BLD: 5.7 % (ref 4.5–6.2)
HCT VFR BLD AUTO: 39.4 % (ref 37–48.5)
HDLC SERPL-MCNC: 36 MG/DL (ref 40–75)
HDLC SERPL: 20.7 % (ref 20–50)
HGB BLD-MCNC: 13 G/DL (ref 12–16)
IMM GRANULOCYTES # BLD AUTO: 0.01 K/UL (ref 0–0.04)
IMM GRANULOCYTES NFR BLD AUTO: 0.2 % (ref 0–0.5)
LDLC SERPL CALC-MCNC: 97.4 MG/DL (ref 63–159)
LYMPHOCYTES # BLD AUTO: 2.8 K/UL (ref 1–4.8)
LYMPHOCYTES NFR BLD: 46.4 % (ref 18–48)
MAGNESIUM SERPL-MCNC: 1.9 MG/DL (ref 1.6–2.6)
MAGNESIUM SERPL-MCNC: 2 MG/DL (ref 1.6–2.6)
MCH RBC QN AUTO: 29.1 PG (ref 27–31)
MCHC RBC AUTO-ENTMCNC: 33 G/DL (ref 32–36)
MCV RBC AUTO: 88 FL (ref 82–98)
MONOCYTES # BLD AUTO: 0.5 K/UL (ref 0.3–1)
MONOCYTES NFR BLD: 8.6 % (ref 4–15)
NEUTROPHILS # BLD AUTO: 2.4 K/UL (ref 1.8–7.7)
NEUTROPHILS NFR BLD: 39.9 % (ref 38–73)
NONHDLC SERPL-MCNC: 138 MG/DL
NRBC BLD-RTO: 0 /100 WBC
PLATELET # BLD AUTO: 297 K/UL (ref 150–450)
PMV BLD AUTO: 9.3 FL (ref 9.2–12.9)
POTASSIUM SERPL-SCNC: 4 MMOL/L (ref 3.5–5.1)
PROT SERPL-MCNC: 5.9 G/DL (ref 6–8.4)
RBC # BLD AUTO: 4.47 M/UL (ref 4–5.4)
SODIUM SERPL-SCNC: 144 MMOL/L (ref 136–145)
TRIGL SERPL-MCNC: 203 MG/DL (ref 30–150)
WBC # BLD AUTO: 5.95 K/UL (ref 3.9–12.7)

## 2021-08-16 PROCEDURE — 83036 HEMOGLOBIN GLYCOSYLATED A1C: CPT | Performed by: FAMILY MEDICINE

## 2021-08-16 PROCEDURE — G0378 HOSPITAL OBSERVATION PER HR: HCPCS

## 2021-08-16 PROCEDURE — 25000003 PHARM REV CODE 250: Performed by: FAMILY MEDICINE

## 2021-08-16 PROCEDURE — G0378 HOSPITAL OBSERVATION PER HR: HCPCS | Mod: CS

## 2021-08-16 PROCEDURE — 25000003 PHARM REV CODE 250: Performed by: INTERNAL MEDICINE

## 2021-08-16 PROCEDURE — 83735 ASSAY OF MAGNESIUM: CPT | Mod: 91 | Performed by: FAMILY MEDICINE

## 2021-08-16 PROCEDURE — 96367 TX/PROPH/DG ADDL SEQ IV INF: CPT

## 2021-08-16 PROCEDURE — 63600175 PHARM REV CODE 636 W HCPCS: Performed by: EMERGENCY MEDICINE

## 2021-08-16 PROCEDURE — S0030 INJECTION, METRONIDAZOLE: HCPCS | Performed by: FAMILY MEDICINE

## 2021-08-16 PROCEDURE — 25000003 PHARM REV CODE 250: Performed by: EMERGENCY MEDICINE

## 2021-08-16 PROCEDURE — 63600175 PHARM REV CODE 636 W HCPCS: Performed by: INTERNAL MEDICINE

## 2021-08-16 PROCEDURE — S0030 INJECTION, METRONIDAZOLE: HCPCS | Performed by: EMERGENCY MEDICINE

## 2021-08-16 PROCEDURE — 80053 COMPREHEN METABOLIC PANEL: CPT | Performed by: FAMILY MEDICINE

## 2021-08-16 PROCEDURE — 36415 COLL VENOUS BLD VENIPUNCTURE: CPT | Performed by: INTERNAL MEDICINE

## 2021-08-16 PROCEDURE — 36415 COLL VENOUS BLD VENIPUNCTURE: CPT | Performed by: FAMILY MEDICINE

## 2021-08-16 PROCEDURE — 83735 ASSAY OF MAGNESIUM: CPT | Performed by: INTERNAL MEDICINE

## 2021-08-16 PROCEDURE — 96376 TX/PRO/DX INJ SAME DRUG ADON: CPT

## 2021-08-16 PROCEDURE — 85025 COMPLETE CBC W/AUTO DIFF WBC: CPT | Performed by: FAMILY MEDICINE

## 2021-08-16 PROCEDURE — 80061 LIPID PANEL: CPT | Performed by: INTERNAL MEDICINE

## 2021-08-16 RX ORDER — METRONIDAZOLE 500 MG/100ML
500 INJECTION, SOLUTION INTRAVENOUS
Status: DISCONTINUED | OUTPATIENT
Start: 2021-08-16 | End: 2021-08-16 | Stop reason: HOSPADM

## 2021-08-16 RX ORDER — TALC
9 POWDER (GRAM) TOPICAL NIGHTLY PRN
Status: DISCONTINUED | OUTPATIENT
Start: 2021-08-16 | End: 2021-08-16 | Stop reason: HOSPADM

## 2021-08-16 RX ORDER — SODIUM CHLORIDE 0.9 % (FLUSH) 0.9 %
10 SYRINGE (ML) INJECTION EVERY 6 HOURS PRN
Status: DISCONTINUED | OUTPATIENT
Start: 2021-08-16 | End: 2021-08-16 | Stop reason: HOSPADM

## 2021-08-16 RX ORDER — PROCHLORPERAZINE EDISYLATE 5 MG/ML
5 INJECTION INTRAMUSCULAR; INTRAVENOUS EVERY 6 HOURS PRN
Status: DISCONTINUED | OUTPATIENT
Start: 2021-08-16 | End: 2021-08-16 | Stop reason: HOSPADM

## 2021-08-16 RX ORDER — ONDANSETRON 2 MG/ML
4 INJECTION INTRAMUSCULAR; INTRAVENOUS EVERY 8 HOURS PRN
Status: DISCONTINUED | OUTPATIENT
Start: 2021-08-16 | End: 2021-08-16 | Stop reason: HOSPADM

## 2021-08-16 RX ORDER — ACETAMINOPHEN 325 MG/1
650 TABLET ORAL EVERY 4 HOURS PRN
Status: DISCONTINUED | OUTPATIENT
Start: 2021-08-16 | End: 2021-08-16 | Stop reason: HOSPADM

## 2021-08-16 RX ORDER — MORPHINE SULFATE 4 MG/ML
4 INJECTION, SOLUTION INTRAMUSCULAR; INTRAVENOUS EVERY 4 HOURS PRN
Status: DISCONTINUED | OUTPATIENT
Start: 2021-08-16 | End: 2021-08-16

## 2021-08-16 RX ORDER — HYDROCODONE BITARTRATE AND ACETAMINOPHEN 5; 325 MG/1; MG/1
1 TABLET ORAL EVERY 6 HOURS PRN
Status: DISCONTINUED | OUTPATIENT
Start: 2021-08-16 | End: 2021-08-16 | Stop reason: HOSPADM

## 2021-08-16 RX ORDER — LEVOFLOXACIN 5 MG/ML
750 INJECTION, SOLUTION INTRAVENOUS
Status: DISCONTINUED | OUTPATIENT
Start: 2021-08-17 | End: 2021-08-16 | Stop reason: HOSPADM

## 2021-08-16 RX ORDER — GLUCAGON 1 MG
1 KIT INJECTION
Status: DISCONTINUED | OUTPATIENT
Start: 2021-08-16 | End: 2021-08-16 | Stop reason: HOSPADM

## 2021-08-16 RX ORDER — KETOROLAC TROMETHAMINE 10 MG/1
10 TABLET, FILM COATED ORAL EVERY 6 HOURS PRN
Qty: 12 TABLET | Refills: 0 | Status: SHIPPED | OUTPATIENT
Start: 2021-08-16 | End: 2021-09-20

## 2021-08-16 RX ORDER — POLYETHYLENE GLYCOL 3350 17 G/17G
17 POWDER, FOR SOLUTION ORAL DAILY
Status: DISCONTINUED | OUTPATIENT
Start: 2021-08-16 | End: 2021-08-16

## 2021-08-16 RX ORDER — LEVOFLOXACIN 750 MG/1
750 TABLET ORAL DAILY
Qty: 5 TABLET | Refills: 0 | Status: SHIPPED | OUTPATIENT
Start: 2021-08-16 | End: 2021-08-21

## 2021-08-16 RX ORDER — METRONIDAZOLE 500 MG/1
500 TABLET ORAL EVERY 8 HOURS
Qty: 15 TABLET | Refills: 0 | Status: SHIPPED | OUTPATIENT
Start: 2021-08-16 | End: 2021-08-21

## 2021-08-16 RX ORDER — IBUPROFEN 200 MG
24 TABLET ORAL
Status: DISCONTINUED | OUTPATIENT
Start: 2021-08-16 | End: 2021-08-16 | Stop reason: HOSPADM

## 2021-08-16 RX ORDER — IBUPROFEN 200 MG
16 TABLET ORAL
Status: DISCONTINUED | OUTPATIENT
Start: 2021-08-16 | End: 2021-08-16 | Stop reason: HOSPADM

## 2021-08-16 RX ORDER — ONDANSETRON 4 MG/1
8 TABLET, ORALLY DISINTEGRATING ORAL EVERY 6 HOURS PRN
Qty: 15 TABLET | Refills: 0 | Status: SHIPPED | OUTPATIENT
Start: 2021-08-16 | End: 2021-08-31 | Stop reason: SDUPTHER

## 2021-08-16 RX ORDER — SODIUM CHLORIDE, SODIUM LACTATE, POTASSIUM CHLORIDE, CALCIUM CHLORIDE 600; 310; 30; 20 MG/100ML; MG/100ML; MG/100ML; MG/100ML
INJECTION, SOLUTION INTRAVENOUS CONTINUOUS
Status: DISCONTINUED | OUTPATIENT
Start: 2021-08-16 | End: 2021-08-16 | Stop reason: HOSPADM

## 2021-08-16 RX ORDER — ACETAMINOPHEN 500 MG
1000 TABLET ORAL EVERY 8 HOURS PRN
Status: DISCONTINUED | OUTPATIENT
Start: 2021-08-16 | End: 2021-08-16 | Stop reason: HOSPADM

## 2021-08-16 RX ADMIN — HYDROCODONE BITARTRATE AND ACETAMINOPHEN 1 TABLET: 5; 325 TABLET ORAL at 01:08

## 2021-08-16 RX ADMIN — LEVOFLOXACIN 500 MG: 500 INJECTION, SOLUTION INTRAVENOUS at 01:08

## 2021-08-16 RX ADMIN — POLYETHYLENE GLYCOL 3350 17 G: 17 POWDER, FOR SOLUTION ORAL at 09:08

## 2021-08-16 RX ADMIN — METRONIDAZOLE 500 MG: 500 INJECTION, SOLUTION INTRAVENOUS at 10:08

## 2021-08-16 RX ADMIN — METRONIDAZOLE 500 MG: 500 INJECTION, SOLUTION INTRAVENOUS at 12:08

## 2021-08-16 RX ADMIN — SODIUM CHLORIDE, SODIUM LACTATE, POTASSIUM CHLORIDE, AND CALCIUM CHLORIDE: .6; .31; .03; .02 INJECTION, SOLUTION INTRAVENOUS at 03:08

## 2021-08-17 LAB
CHLAMYDIA, AMPLIFIED DNA: NEGATIVE
N GONORRHOEAE, AMPLIFIED DNA: NEGATIVE

## 2021-08-18 LAB — BACTERIA UR CULT: ABNORMAL

## 2021-08-21 LAB
BACTERIA BLD CULT: NORMAL
BACTERIA BLD CULT: NORMAL

## 2021-08-29 ENCOUNTER — HOSPITAL ENCOUNTER (EMERGENCY)
Facility: HOSPITAL | Age: 46
Discharge: LEFT BEFORE DISCHARGE | End: 2021-08-29
Attending: EMERGENCY MEDICINE
Payer: MEDICARE

## 2021-08-29 VITALS
HEIGHT: 64 IN | TEMPERATURE: 99 F | BODY MASS INDEX: 28.17 KG/M2 | WEIGHT: 165 LBS | RESPIRATION RATE: 22 BRPM | OXYGEN SATURATION: 98 % | SYSTOLIC BLOOD PRESSURE: 143 MMHG | DIASTOLIC BLOOD PRESSURE: 58 MMHG | HEART RATE: 88 BPM

## 2021-08-29 DIAGNOSIS — R19.7 DIARRHEA, UNSPECIFIED TYPE: ICD-10-CM

## 2021-08-29 DIAGNOSIS — R11.2 NAUSEA AND VOMITING, INTRACTABILITY OF VOMITING NOT SPECIFIED, UNSPECIFIED VOMITING TYPE: ICD-10-CM

## 2021-08-29 DIAGNOSIS — R10.9 ABDOMINAL PAIN, UNSPECIFIED ABDOMINAL LOCATION: Primary | ICD-10-CM

## 2021-08-29 PROCEDURE — 99281 EMR DPT VST MAYX REQ PHY/QHP: CPT

## 2021-08-29 RX ORDER — HYDROMORPHONE HYDROCHLORIDE 1 MG/ML
1 INJECTION, SOLUTION INTRAMUSCULAR; INTRAVENOUS; SUBCUTANEOUS
Status: DISCONTINUED | OUTPATIENT
Start: 2021-08-29 | End: 2021-08-29 | Stop reason: HOSPADM

## 2021-08-29 RX ORDER — ONDANSETRON 2 MG/ML
4 INJECTION INTRAMUSCULAR; INTRAVENOUS
Status: DISCONTINUED | OUTPATIENT
Start: 2021-08-29 | End: 2021-08-29 | Stop reason: HOSPADM

## 2021-08-31 ENCOUNTER — HOSPITAL ENCOUNTER (EMERGENCY)
Facility: HOSPITAL | Age: 46
Discharge: HOME | End: 2021-08-31
Attending: EMERGENCY MEDICINE
Payer: MEDICARE

## 2021-08-31 VITALS
HEART RATE: 71 BPM | RESPIRATION RATE: 17 BRPM | DIASTOLIC BLOOD PRESSURE: 61 MMHG | HEIGHT: 66 IN | SYSTOLIC BLOOD PRESSURE: 95 MMHG | WEIGHT: 165 LBS | TEMPERATURE: 98 F | OXYGEN SATURATION: 98 % | BODY MASS INDEX: 26.52 KG/M2

## 2021-08-31 DIAGNOSIS — R11.10 VOMITING, INTRACTABILITY OF VOMITING NOT SPECIFIED, PRESENCE OF NAUSEA NOT SPECIFIED, UNSPECIFIED VOMITING TYPE: ICD-10-CM

## 2021-08-31 DIAGNOSIS — R10.30 LOWER ABDOMINAL PAIN: Primary | ICD-10-CM

## 2021-08-31 DIAGNOSIS — R19.7 DIARRHEA, UNSPECIFIED TYPE: ICD-10-CM

## 2021-08-31 LAB
ABO + RH BLD: NORMAL
ALBUMIN SERPL BCP-MCNC: 4.4 G/DL (ref 3.5–5.2)
ALP SERPL-CCNC: 95 U/L (ref 55–135)
ALT SERPL W/O P-5'-P-CCNC: 66 U/L (ref 10–44)
AMPHET+METHAMPHET UR QL: ABNORMAL
ANION GAP SERPL CALC-SCNC: 11 MMOL/L (ref 8–16)
AST SERPL-CCNC: 32 U/L (ref 10–40)
BACTERIA #/AREA URNS HPF: ABNORMAL /HPF
BARBITURATES UR QL SCN>200 NG/ML: NEGATIVE
BASOPHILS # BLD AUTO: 0.08 K/UL (ref 0–0.2)
BASOPHILS NFR BLD: 0.9 % (ref 0–1.9)
BENZODIAZ UR QL SCN>200 NG/ML: ABNORMAL
BILIRUB SERPL-MCNC: 0.9 MG/DL (ref 0.1–1)
BILIRUB UR QL STRIP: NEGATIVE
BLD GP AB SCN CELLS X3 SERPL QL: NORMAL
BUN SERPL-MCNC: 13 MG/DL (ref 6–20)
BZE UR QL SCN: NEGATIVE
CALCIUM SERPL-MCNC: 9.4 MG/DL (ref 8.7–10.5)
CANNABINOIDS UR QL SCN: NEGATIVE
CHLORIDE SERPL-SCNC: 107 MMOL/L (ref 95–110)
CLARITY UR: ABNORMAL
CO2 SERPL-SCNC: 23 MMOL/L (ref 23–29)
COLOR UR: YELLOW
CREAT SERPL-MCNC: 1 MG/DL (ref 0.5–1.4)
CREAT UR-MCNC: 510 MG/DL (ref 15–325)
DIFFERENTIAL METHOD: ABNORMAL
EOSINOPHIL # BLD AUTO: 0.3 K/UL (ref 0–0.5)
EOSINOPHIL NFR BLD: 3.8 % (ref 0–8)
ERYTHROCYTE [DISTWIDTH] IN BLOOD BY AUTOMATED COUNT: 13.8 % (ref 11.5–14.5)
EST. GFR  (AFRICAN AMERICAN): >60 ML/MIN/1.73 M^2
EST. GFR  (NON AFRICAN AMERICAN): >60 ML/MIN/1.73 M^2
GLUCOSE SERPL-MCNC: 106 MG/DL (ref 70–110)
GLUCOSE UR QL STRIP: NEGATIVE
HCT VFR BLD AUTO: 45.4 % (ref 37–48.5)
HGB BLD-MCNC: 14.9 G/DL (ref 12–16)
HGB UR QL STRIP: NEGATIVE
HYALINE CASTS #/AREA URNS LPF: 40 /LPF
IMM GRANULOCYTES # BLD AUTO: 0.02 K/UL (ref 0–0.04)
IMM GRANULOCYTES NFR BLD AUTO: 0.2 % (ref 0–0.5)
KETONES UR QL STRIP: NEGATIVE
LEUKOCYTE ESTERASE UR QL STRIP: ABNORMAL
LIPASE SERPL-CCNC: 26 U/L (ref 4–60)
LYMPHOCYTES # BLD AUTO: 3 K/UL (ref 1–4.8)
LYMPHOCYTES NFR BLD: 32.7 % (ref 18–48)
MCH RBC QN AUTO: 28.7 PG (ref 27–31)
MCHC RBC AUTO-ENTMCNC: 32.8 G/DL (ref 32–36)
MCV RBC AUTO: 87 FL (ref 82–98)
MICROSCOPIC COMMENT: ABNORMAL
MONOCYTES # BLD AUTO: 0.7 K/UL (ref 0.3–1)
MONOCYTES NFR BLD: 7.3 % (ref 4–15)
NEUTROPHILS # BLD AUTO: 5 K/UL (ref 1.8–7.7)
NEUTROPHILS NFR BLD: 55.1 % (ref 38–73)
NITRITE UR QL STRIP: NEGATIVE
NRBC BLD-RTO: 0 /100 WBC
OB PNL STL: NEGATIVE
OPIATES UR QL SCN: NEGATIVE
PCP UR QL SCN>25 NG/ML: NEGATIVE
PH UR STRIP: 6 [PH] (ref 5–8)
PLATELET # BLD AUTO: 300 K/UL (ref 150–450)
PMV BLD AUTO: 9.1 FL (ref 9.2–12.9)
POTASSIUM SERPL-SCNC: 3.8 MMOL/L (ref 3.5–5.1)
PROT SERPL-MCNC: 7.9 G/DL (ref 6–8.4)
PROT UR QL STRIP: ABNORMAL
RBC # BLD AUTO: 5.2 M/UL (ref 4–5.4)
RBC #/AREA URNS HPF: 2 /HPF (ref 0–4)
SODIUM SERPL-SCNC: 141 MMOL/L (ref 136–145)
SP GR UR STRIP: 1.03 (ref 1–1.03)
SQUAMOUS #/AREA URNS HPF: 14 /HPF
TOXICOLOGY INFORMATION: ABNORMAL
URN SPEC COLLECT METH UR: ABNORMAL
UROBILINOGEN UR STRIP-ACNC: NEGATIVE EU/DL
WBC # BLD AUTO: 9.04 K/UL (ref 3.9–12.7)
WBC #/AREA URNS HPF: 18 /HPF (ref 0–5)

## 2021-08-31 PROCEDURE — 63600175 PHARM REV CODE 636 W HCPCS: Performed by: EMERGENCY MEDICINE

## 2021-08-31 PROCEDURE — 25500020 PHARM REV CODE 255: Performed by: EMERGENCY MEDICINE

## 2021-08-31 PROCEDURE — 85025 COMPLETE CBC W/AUTO DIFF WBC: CPT | Performed by: PHYSICIAN ASSISTANT

## 2021-08-31 PROCEDURE — 87186 SC STD MICRODIL/AGAR DIL: CPT | Performed by: PHYSICIAN ASSISTANT

## 2021-08-31 PROCEDURE — 96374 THER/PROPH/DIAG INJ IV PUSH: CPT

## 2021-08-31 PROCEDURE — 86900 BLOOD TYPING SEROLOGIC ABO: CPT | Performed by: PHYSICIAN ASSISTANT

## 2021-08-31 PROCEDURE — 96372 THER/PROPH/DIAG INJ SC/IM: CPT | Mod: 59

## 2021-08-31 PROCEDURE — 80053 COMPREHEN METABOLIC PANEL: CPT | Performed by: PHYSICIAN ASSISTANT

## 2021-08-31 PROCEDURE — 81001 URINALYSIS AUTO W/SCOPE: CPT | Mod: 59 | Performed by: PHYSICIAN ASSISTANT

## 2021-08-31 PROCEDURE — 87086 URINE CULTURE/COLONY COUNT: CPT | Performed by: PHYSICIAN ASSISTANT

## 2021-08-31 PROCEDURE — 99285 EMERGENCY DEPT VISIT HI MDM: CPT | Mod: 25

## 2021-08-31 PROCEDURE — 96361 HYDRATE IV INFUSION ADD-ON: CPT

## 2021-08-31 PROCEDURE — 87077 CULTURE AEROBIC IDENTIFY: CPT | Performed by: PHYSICIAN ASSISTANT

## 2021-08-31 PROCEDURE — 83690 ASSAY OF LIPASE: CPT | Performed by: PHYSICIAN ASSISTANT

## 2021-08-31 PROCEDURE — 82272 OCCULT BLD FECES 1-3 TESTS: CPT | Performed by: EMERGENCY MEDICINE

## 2021-08-31 PROCEDURE — 80307 DRUG TEST PRSMV CHEM ANLYZR: CPT | Performed by: EMERGENCY MEDICINE

## 2021-08-31 PROCEDURE — 87147 CULTURE TYPE IMMUNOLOGIC: CPT | Performed by: PHYSICIAN ASSISTANT

## 2021-08-31 PROCEDURE — 25000003 PHARM REV CODE 250: Performed by: EMERGENCY MEDICINE

## 2021-08-31 RX ORDER — ONDANSETRON 4 MG/1
8 TABLET, ORALLY DISINTEGRATING ORAL EVERY 6 HOURS PRN
Qty: 15 TABLET | Refills: 0 | Status: SHIPPED | OUTPATIENT
Start: 2021-08-31 | End: 2021-09-20

## 2021-08-31 RX ORDER — DROPERIDOL 2.5 MG/ML
0.62 INJECTION, SOLUTION INTRAMUSCULAR; INTRAVENOUS ONCE
Status: COMPLETED | OUTPATIENT
Start: 2021-08-31 | End: 2021-08-31

## 2021-08-31 RX ORDER — DICYCLOMINE HYDROCHLORIDE 20 MG/1
20 TABLET ORAL 2 TIMES DAILY PRN
Qty: 20 TABLET | Refills: 0 | Status: SHIPPED | OUTPATIENT
Start: 2021-08-31 | End: 2021-09-10

## 2021-08-31 RX ORDER — DICYCLOMINE HYDROCHLORIDE 10 MG/ML
20 INJECTION INTRAMUSCULAR
Status: COMPLETED | OUTPATIENT
Start: 2021-08-31 | End: 2021-08-31

## 2021-08-31 RX ADMIN — IOHEXOL 100 ML: 350 INJECTION, SOLUTION INTRAVENOUS at 05:08

## 2021-08-31 RX ADMIN — SODIUM CHLORIDE 1000 ML: 0.9 INJECTION, SOLUTION INTRAVENOUS at 06:08

## 2021-08-31 RX ADMIN — DROPERIDOL 0.62 MG: 2.5 INJECTION, SOLUTION INTRAMUSCULAR; INTRAVENOUS at 05:08

## 2021-08-31 RX ADMIN — DICYCLOMINE HYDROCHLORIDE 20 MG: 10 INJECTION INTRAMUSCULAR at 05:08

## 2021-09-03 LAB — BACTERIA UR CULT: ABNORMAL

## 2021-09-04 ENCOUNTER — HOSPITAL ENCOUNTER (EMERGENCY)
Facility: HOSPITAL | Age: 46
Discharge: HOME | End: 2021-09-04
Attending: EMERGENCY MEDICINE
Payer: MEDICARE

## 2021-09-04 VITALS
TEMPERATURE: 96 F | RESPIRATION RATE: 20 BRPM | OXYGEN SATURATION: 96 % | BODY MASS INDEX: 26.52 KG/M2 | WEIGHT: 165 LBS | HEIGHT: 66 IN | DIASTOLIC BLOOD PRESSURE: 69 MMHG | SYSTOLIC BLOOD PRESSURE: 93 MMHG | HEART RATE: 66 BPM

## 2021-09-04 DIAGNOSIS — R11.2 NON-INTRACTABLE VOMITING WITH NAUSEA, UNSPECIFIED VOMITING TYPE: Primary | ICD-10-CM

## 2021-09-04 DIAGNOSIS — F19.10 POLYSUBSTANCE ABUSE: ICD-10-CM

## 2021-09-04 LAB
ALBUMIN SERPL BCP-MCNC: 3.8 G/DL (ref 3.5–5.2)
ALP SERPL-CCNC: 86 U/L (ref 55–135)
ALT SERPL W/O P-5'-P-CCNC: 27 U/L (ref 10–44)
AMPHET+METHAMPHET UR QL: ABNORMAL
ANION GAP SERPL CALC-SCNC: 11 MMOL/L (ref 8–16)
AST SERPL-CCNC: 18 U/L (ref 10–40)
B-HCG UR QL: NEGATIVE
BARBITURATES UR QL SCN>200 NG/ML: NEGATIVE
BASOPHILS # BLD AUTO: 0.08 K/UL (ref 0–0.2)
BASOPHILS NFR BLD: 0.9 % (ref 0–1.9)
BENZODIAZ UR QL SCN>200 NG/ML: ABNORMAL
BILIRUB SERPL-MCNC: 0.3 MG/DL (ref 0.1–1)
BILIRUB UR QL STRIP: NEGATIVE
BUN SERPL-MCNC: 18 MG/DL (ref 6–20)
BZE UR QL SCN: ABNORMAL
CALCIUM SERPL-MCNC: 9 MG/DL (ref 8.7–10.5)
CANNABINOIDS UR QL SCN: NEGATIVE
CHLORIDE SERPL-SCNC: 108 MMOL/L (ref 95–110)
CLARITY UR: CLEAR
CO2 SERPL-SCNC: 23 MMOL/L (ref 23–29)
COLOR UR: YELLOW
CREAT SERPL-MCNC: 0.9 MG/DL (ref 0.5–1.4)
CREAT UR-MCNC: 205 MG/DL (ref 15–325)
CTP QC/QA: YES
DIFFERENTIAL METHOD: ABNORMAL
EOSINOPHIL # BLD AUTO: 0.5 K/UL (ref 0–0.5)
EOSINOPHIL NFR BLD: 5.2 % (ref 0–8)
ERYTHROCYTE [DISTWIDTH] IN BLOOD BY AUTOMATED COUNT: 13.1 % (ref 11.5–14.5)
EST. GFR  (AFRICAN AMERICAN): >60 ML/MIN/1.73 M^2
EST. GFR  (NON AFRICAN AMERICAN): >60 ML/MIN/1.73 M^2
GLUCOSE SERPL-MCNC: 125 MG/DL (ref 70–110)
GLUCOSE UR QL STRIP: NEGATIVE
HCT VFR BLD AUTO: 39.1 % (ref 37–48.5)
HGB BLD-MCNC: 12.3 G/DL (ref 12–16)
HGB UR QL STRIP: ABNORMAL
IMM GRANULOCYTES # BLD AUTO: 0.02 K/UL (ref 0–0.04)
IMM GRANULOCYTES NFR BLD AUTO: 0.2 % (ref 0–0.5)
KETONES UR QL STRIP: NEGATIVE
LEUKOCYTE ESTERASE UR QL STRIP: NEGATIVE
LIPASE SERPL-CCNC: 131 U/L (ref 4–60)
LYMPHOCYTES # BLD AUTO: 3 K/UL (ref 1–4.8)
LYMPHOCYTES NFR BLD: 32.3 % (ref 18–48)
MCH RBC QN AUTO: 28.2 PG (ref 27–31)
MCHC RBC AUTO-ENTMCNC: 31.5 G/DL (ref 32–36)
MCV RBC AUTO: 90 FL (ref 82–98)
METHADONE UR QL SCN>300 NG/ML: NEGATIVE
MONOCYTES # BLD AUTO: 0.7 K/UL (ref 0.3–1)
MONOCYTES NFR BLD: 7.2 % (ref 4–15)
NEUTROPHILS # BLD AUTO: 5 K/UL (ref 1.8–7.7)
NEUTROPHILS NFR BLD: 54.2 % (ref 38–73)
NITRITE UR QL STRIP: NEGATIVE
NRBC BLD-RTO: 0 /100 WBC
OPIATES UR QL SCN: NEGATIVE
PCP UR QL SCN>25 NG/ML: NEGATIVE
PH UR STRIP: 6 [PH] (ref 5–8)
PLATELET # BLD AUTO: 326 K/UL (ref 150–450)
PMV BLD AUTO: 8.9 FL (ref 9.2–12.9)
POTASSIUM SERPL-SCNC: 3.3 MMOL/L (ref 3.5–5.1)
PROT SERPL-MCNC: 6.8 G/DL (ref 6–8.4)
PROT UR QL STRIP: NEGATIVE
RBC # BLD AUTO: 4.36 M/UL (ref 4–5.4)
SODIUM SERPL-SCNC: 142 MMOL/L (ref 136–145)
SP GR UR STRIP: >=1.03 (ref 1–1.03)
TOXICOLOGY INFORMATION: ABNORMAL
URN SPEC COLLECT METH UR: ABNORMAL
UROBILINOGEN UR STRIP-ACNC: NEGATIVE EU/DL
WBC # BLD AUTO: 9.25 K/UL (ref 3.9–12.7)

## 2021-09-04 PROCEDURE — 36415 COLL VENOUS BLD VENIPUNCTURE: CPT | Performed by: EMERGENCY MEDICINE

## 2021-09-04 PROCEDURE — 99283 EMERGENCY DEPT VISIT LOW MDM: CPT | Mod: 25

## 2021-09-04 PROCEDURE — 80307 DRUG TEST PRSMV CHEM ANLYZR: CPT | Performed by: EMERGENCY MEDICINE

## 2021-09-04 PROCEDURE — 81025 URINE PREGNANCY TEST: CPT | Performed by: EMERGENCY MEDICINE

## 2021-09-04 PROCEDURE — 83690 ASSAY OF LIPASE: CPT | Performed by: EMERGENCY MEDICINE

## 2021-09-04 PROCEDURE — 81003 URINALYSIS AUTO W/O SCOPE: CPT | Performed by: EMERGENCY MEDICINE

## 2021-09-04 PROCEDURE — 80053 COMPREHEN METABOLIC PANEL: CPT | Performed by: EMERGENCY MEDICINE

## 2021-09-04 PROCEDURE — 85025 COMPLETE CBC W/AUTO DIFF WBC: CPT | Performed by: EMERGENCY MEDICINE

## 2021-09-04 RX ORDER — PROMETHAZINE HYDROCHLORIDE 25 MG/1
25 SUPPOSITORY RECTAL EVERY 6 HOURS PRN
Qty: 10 SUPPOSITORY | Refills: 0 | Status: SHIPPED | OUTPATIENT
Start: 2021-09-04 | End: 2021-09-04 | Stop reason: SDUPTHER

## 2021-09-04 RX ORDER — PROMETHAZINE HYDROCHLORIDE 25 MG/1
25 SUPPOSITORY RECTAL EVERY 6 HOURS PRN
Qty: 10 SUPPOSITORY | Refills: 0 | Status: SHIPPED | OUTPATIENT
Start: 2021-09-04 | End: 2022-01-20

## 2021-09-20 ENCOUNTER — OFFICE VISIT (OUTPATIENT)
Dept: SURGERY | Facility: CLINIC | Age: 46
End: 2021-09-20
Payer: MEDICARE

## 2021-09-20 VITALS — WEIGHT: 165 LBS | HEIGHT: 66 IN | BODY MASS INDEX: 26.52 KG/M2

## 2021-09-20 DIAGNOSIS — R10.9 RIGHT FLANK PAIN: ICD-10-CM

## 2021-09-20 DIAGNOSIS — L98.9 GENERALIZED SKIN LESIONS: Primary | ICD-10-CM

## 2021-09-20 DIAGNOSIS — N76.4 LABIAL ABSCESS: ICD-10-CM

## 2021-09-20 DIAGNOSIS — Z12.31 ENCOUNTER FOR SCREENING MAMMOGRAM FOR MALIGNANT NEOPLASM OF BREAST: ICD-10-CM

## 2021-09-20 PROCEDURE — 11104 PR PUNCH BIOPSY, SKIN, SINGLE LESION: ICD-10-PCS | Mod: S$GLB,,, | Performed by: SURGERY

## 2021-09-20 PROCEDURE — 99203 OFFICE O/P NEW LOW 30 MIN: CPT | Mod: 25,S$GLB,, | Performed by: SURGERY

## 2021-09-20 PROCEDURE — 99203 PR OFFICE/OUTPT VISIT, NEW, LEVL III, 30-44 MIN: ICD-10-PCS | Mod: 25,S$GLB,, | Performed by: SURGERY

## 2021-09-20 PROCEDURE — 11104 PUNCH BX SKIN SINGLE LESION: CPT | Mod: S$GLB,,, | Performed by: SURGERY

## 2021-09-20 RX ORDER — DIAZEPAM 10 MG/1
10 TABLET ORAL EVERY 8 HOURS PRN
Status: ON HOLD | COMMUNITY
Start: 2021-08-20 | End: 2022-01-21 | Stop reason: HOSPADM

## 2021-09-20 RX ORDER — MONTELUKAST SODIUM 10 MG/1
TABLET ORAL
COMMUNITY
End: 2022-01-20

## 2021-09-20 RX ORDER — CARIPRAZINE 3 MG/1
CAPSULE, GELATIN COATED ORAL
COMMUNITY
End: 2022-01-20

## 2021-09-20 RX ORDER — TRAMADOL HYDROCHLORIDE 50 MG/1
50 TABLET ORAL EVERY 8 HOURS PRN
Qty: 12 TABLET | Refills: 0 | Status: SHIPPED | OUTPATIENT
Start: 2021-09-20 | End: 2021-09-27

## 2021-09-20 RX ORDER — SULFAMETHOXAZOLE AND TRIMETHOPRIM 800; 160 MG/1; MG/1
1 TABLET ORAL 2 TIMES DAILY
Qty: 20 TABLET | Refills: 0 | Status: SHIPPED | OUTPATIENT
Start: 2021-09-20 | End: 2021-09-27 | Stop reason: SDUPTHER

## 2021-09-20 RX ORDER — DULOXETIN HYDROCHLORIDE 20 MG/1
CAPSULE, DELAYED RELEASE ORAL
COMMUNITY
End: 2022-01-20

## 2021-09-21 ENCOUNTER — OUTPATIENT CASE MANAGEMENT (OUTPATIENT)
Dept: ADMINISTRATIVE | Facility: OTHER | Age: 46
End: 2021-09-21

## 2021-09-27 ENCOUNTER — TELEPHONE (OUTPATIENT)
Dept: SURGERY | Facility: CLINIC | Age: 46
End: 2021-09-27

## 2021-10-06 DIAGNOSIS — S09.90XA HEAD INJURY: Primary | ICD-10-CM

## 2021-10-06 DIAGNOSIS — N64.52 NIPPLE DISCHARGE: ICD-10-CM

## 2021-10-15 ENCOUNTER — HOSPITAL ENCOUNTER (OUTPATIENT)
Dept: RADIOLOGY | Facility: HOSPITAL | Age: 46
Discharge: HOME | End: 2021-10-15
Attending: EMERGENCY MEDICINE
Payer: MEDICARE

## 2021-10-15 DIAGNOSIS — N64.52 NIPPLE DISCHARGE: ICD-10-CM

## 2021-10-15 PROCEDURE — 77066 DX MAMMO INCL CAD BI: CPT | Mod: TC,PO

## 2021-10-21 DIAGNOSIS — R59.1 GENERALIZED ENLARGED LYMPH NODES: Primary | ICD-10-CM

## 2021-10-22 DIAGNOSIS — S09.90XA UNSPECIFIED INJURY OF HEAD, INITIAL ENCOUNTER: Primary | ICD-10-CM

## 2021-10-27 DIAGNOSIS — N28.81 HYPERTROPHY OF KIDNEY: Primary | ICD-10-CM

## 2021-11-01 ENCOUNTER — HOSPITAL ENCOUNTER (OUTPATIENT)
Dept: RADIOLOGY | Facility: HOSPITAL | Age: 46
Discharge: HOME | End: 2021-11-01
Attending: EMERGENCY MEDICINE
Payer: MEDICARE

## 2021-11-01 DIAGNOSIS — S09.90XA UNSPECIFIED INJURY OF HEAD, INITIAL ENCOUNTER: ICD-10-CM

## 2021-11-01 PROCEDURE — 25500020 PHARM REV CODE 255: Performed by: EMERGENCY MEDICINE

## 2021-11-01 PROCEDURE — 70553 MRI BRAIN STEM W/O & W/DYE: CPT | Mod: TC

## 2021-11-01 PROCEDURE — A9585 GADOBUTROL INJECTION: HCPCS | Performed by: EMERGENCY MEDICINE

## 2021-11-01 RX ORDER — GADOBUTROL 604.72 MG/ML
7.5 INJECTION INTRAVENOUS
Status: COMPLETED | OUTPATIENT
Start: 2021-11-01 | End: 2021-11-01

## 2021-11-01 RX ADMIN — GADOBUTROL 7.5 ML: 604.72 INJECTION INTRAVENOUS at 05:11

## 2021-11-12 ENCOUNTER — HOSPITAL ENCOUNTER (OUTPATIENT)
Dept: RADIOLOGY | Facility: HOSPITAL | Age: 46
Discharge: HOME | End: 2021-11-12
Attending: EMERGENCY MEDICINE
Payer: MEDICARE

## 2021-11-12 DIAGNOSIS — N64.52 BILATERAL NIPPLE DISCHARGE: ICD-10-CM

## 2021-11-12 DIAGNOSIS — R59.1 GENERALIZED ENLARGED LYMPH NODES: Primary | ICD-10-CM

## 2021-11-12 PROCEDURE — 76642 ULTRASOUND BREAST LIMITED: CPT | Mod: TC,50,PO

## 2021-11-15 ENCOUNTER — HOSPITAL ENCOUNTER (OUTPATIENT)
Dept: RADIOLOGY | Facility: HOSPITAL | Age: 46
Discharge: HOME | End: 2021-11-15
Attending: INTERNAL MEDICINE
Payer: MEDICARE

## 2021-11-15 DIAGNOSIS — R59.1 GENERALIZED ENLARGED LYMPH NODES: ICD-10-CM

## 2021-11-15 PROCEDURE — 71260 CT THORAX DX C+: CPT | Mod: TC

## 2021-11-15 PROCEDURE — 25500020 PHARM REV CODE 255: Performed by: INTERNAL MEDICINE

## 2021-11-15 RX ADMIN — IOHEXOL 100 ML: 350 INJECTION, SOLUTION INTRAVENOUS at 01:11

## 2021-11-16 ENCOUNTER — HOSPITAL ENCOUNTER (OUTPATIENT)
Dept: RADIOLOGY | Facility: HOSPITAL | Age: 46
Discharge: HOME | End: 2021-11-16
Attending: EMERGENCY MEDICINE
Payer: MEDICARE

## 2021-11-16 DIAGNOSIS — N28.81 HYPERTROPHY OF KIDNEY: ICD-10-CM

## 2021-11-16 PROCEDURE — 76770 US EXAM ABDO BACK WALL COMP: CPT | Mod: TC,PO

## 2021-12-09 DIAGNOSIS — N39.0 URINARY TRACT INFECTION, SITE NOT SPECIFIED: ICD-10-CM

## 2021-12-09 DIAGNOSIS — N64.52 NIPPLE DISCHARGE: Primary | ICD-10-CM

## 2021-12-09 DIAGNOSIS — E86.0 DEHYDRATION: ICD-10-CM

## 2022-01-04 ENCOUNTER — TELEPHONE (OUTPATIENT)
Dept: HEMATOLOGY/ONCOLOGY | Facility: CLINIC | Age: 47
End: 2022-01-04
Payer: MEDICARE

## 2022-01-04 NOTE — NURSING
Returned call to patient regarding her previous call.  Patient stated that she feels awful and needs home health, when asked why home health she stated for fluids or pain managment.  Patient was advised that she should follow up with her PCP, MD Gurinder, to discuss Home Health request and to have her MRI Scans as previously ordered related to her previous CT Scans.  Patient verbalized that she has not been given all her information regarding previous results, again patient informed that she should follow up with her PCP to discuss.   Patient then stated that she does not have the desire to drink or eat and lays in bed most of the day. She only drinks to wet her mouth.  She stated she does have some black and red blood discharge from the breast, again patient advised to have MRI so that a the providers could review and determine what would be the next action for the her.  Patient advised to follow up with PCP, again she stated she was not getting any help.  Also, she was asked about the green diarrhea and patient stated she doesn't eat so she should not have any diarrhea. She was advised if she believes she is dehydrated, not urinating, shortness of breath or  increased pain she could go to the ED for assessment.   Patient then stated all they will do is give me an IV and fluids then send me home.  As I was inquiring about pain, the patient hung the phone up and did not answer on return call.

## 2022-01-04 NOTE — TELEPHONE ENCOUNTER
----- Message from Allison Fowler sent at 1/4/2022  3:09 PM CST -----  Type: Needs Medical Advice  Who Called:  patient   Symptoms (please be specific):  swollen right breast lymph nodes not eating, has green diarrhea,   How long has patient had these symptoms: 6 months    Best Call Back Number: [phone]  Additional Information: patient request call back regarding her health patient ask if she needs a referral to see a hematologist, patient is confused and ask for some help, please advise.   Patient do not have a referral in system.

## 2022-01-18 ENCOUNTER — HOSPITAL ENCOUNTER (EMERGENCY)
Facility: HOSPITAL | Age: 47
Discharge: HOME | End: 2022-01-18
Attending: EMERGENCY MEDICINE
Payer: MEDICARE

## 2022-01-18 VITALS
TEMPERATURE: 99 F | OXYGEN SATURATION: 98 % | RESPIRATION RATE: 20 BRPM | DIASTOLIC BLOOD PRESSURE: 85 MMHG | HEIGHT: 66 IN | WEIGHT: 165 LBS | SYSTOLIC BLOOD PRESSURE: 130 MMHG | BODY MASS INDEX: 26.52 KG/M2 | HEART RATE: 106 BPM

## 2022-01-18 DIAGNOSIS — R11.10 VOMITING, INTRACTABILITY OF VOMITING NOT SPECIFIED, PRESENCE OF NAUSEA NOT SPECIFIED, UNSPECIFIED VOMITING TYPE: Primary | ICD-10-CM

## 2022-01-18 LAB
ALBUMIN SERPL BCP-MCNC: 4 G/DL (ref 3.5–5.2)
ALP SERPL-CCNC: 68 U/L (ref 55–135)
ALT SERPL W/O P-5'-P-CCNC: 19 U/L (ref 10–44)
AMPHET+METHAMPHET UR QL: ABNORMAL
ANION GAP SERPL CALC-SCNC: 12 MMOL/L (ref 8–16)
AST SERPL-CCNC: 20 U/L (ref 10–40)
BARBITURATES UR QL SCN>200 NG/ML: NEGATIVE
BASOPHILS # BLD AUTO: 0.06 K/UL (ref 0–0.2)
BASOPHILS NFR BLD: 0.8 % (ref 0–1.9)
BENZODIAZ UR QL SCN>200 NG/ML: ABNORMAL
BILIRUB SERPL-MCNC: 0.6 MG/DL (ref 0.1–1)
BILIRUB UR QL STRIP: NEGATIVE
BUN SERPL-MCNC: 12 MG/DL (ref 6–20)
BZE UR QL SCN: NEGATIVE
CALCIUM SERPL-MCNC: 9 MG/DL (ref 8.7–10.5)
CANNABINOIDS UR QL SCN: NEGATIVE
CHLORIDE SERPL-SCNC: 107 MMOL/L (ref 95–110)
CLARITY UR: CLEAR
CO2 SERPL-SCNC: 24 MMOL/L (ref 23–29)
COLOR UR: YELLOW
CREAT SERPL-MCNC: 0.9 MG/DL (ref 0.5–1.4)
CREAT UR-MCNC: 170 MG/DL (ref 15–325)
CRP SERPL-MCNC: 0.11 MG/DL
DIFFERENTIAL METHOD: ABNORMAL
EOSINOPHIL # BLD AUTO: 0.3 K/UL (ref 0–0.5)
EOSINOPHIL NFR BLD: 3.6 % (ref 0–8)
ERYTHROCYTE [DISTWIDTH] IN BLOOD BY AUTOMATED COUNT: 12.7 % (ref 11.5–14.5)
ERYTHROCYTE [SEDIMENTATION RATE] IN BLOOD BY WESTERGREN METHOD: 12 MM/HR (ref 0–20)
EST. GFR  (AFRICAN AMERICAN): >60 ML/MIN/1.73 M^2
EST. GFR  (NON AFRICAN AMERICAN): >60 ML/MIN/1.73 M^2
GLUCOSE SERPL-MCNC: 125 MG/DL (ref 70–110)
GLUCOSE UR QL STRIP: NEGATIVE
HCT VFR BLD AUTO: 42 % (ref 37–48.5)
HGB BLD-MCNC: 13.8 G/DL (ref 12–16)
HGB UR QL STRIP: NEGATIVE
IMM GRANULOCYTES # BLD AUTO: 0.02 K/UL (ref 0–0.04)
IMM GRANULOCYTES NFR BLD AUTO: 0.3 % (ref 0–0.5)
KETONES UR QL STRIP: NEGATIVE
LEUKOCYTE ESTERASE UR QL STRIP: NEGATIVE
LYMPHOCYTES # BLD AUTO: 2.4 K/UL (ref 1–4.8)
LYMPHOCYTES NFR BLD: 32.7 % (ref 18–48)
MCH RBC QN AUTO: 28.5 PG (ref 27–31)
MCHC RBC AUTO-ENTMCNC: 32.9 G/DL (ref 32–36)
MCV RBC AUTO: 87 FL (ref 82–98)
MONOCYTES # BLD AUTO: 0.5 K/UL (ref 0.3–1)
MONOCYTES NFR BLD: 6.6 % (ref 4–15)
NEUTROPHILS # BLD AUTO: 4.1 K/UL (ref 1.8–7.7)
NEUTROPHILS NFR BLD: 56 % (ref 38–73)
NITRITE UR QL STRIP: NEGATIVE
NRBC BLD-RTO: 0 /100 WBC
OPIATES UR QL SCN: NEGATIVE
PCP UR QL SCN>25 NG/ML: NEGATIVE
PH UR STRIP: 7 [PH] (ref 5–8)
PLATELET # BLD AUTO: 354 K/UL (ref 150–450)
PMV BLD AUTO: 8.8 FL (ref 9.2–12.9)
POTASSIUM SERPL-SCNC: 3.2 MMOL/L (ref 3.5–5.1)
PROT SERPL-MCNC: 6.9 G/DL (ref 6–8.4)
PROT UR QL STRIP: NEGATIVE
RBC # BLD AUTO: 4.84 M/UL (ref 4–5.4)
SODIUM SERPL-SCNC: 143 MMOL/L (ref 136–145)
SP GR UR STRIP: 1.02 (ref 1–1.03)
TOXICOLOGY INFORMATION: ABNORMAL
URN SPEC COLLECT METH UR: NORMAL
UROBILINOGEN UR STRIP-ACNC: 1 EU/DL
WBC # BLD AUTO: 7.32 K/UL (ref 3.9–12.7)

## 2022-01-18 PROCEDURE — 99284 EMERGENCY DEPT VISIT MOD MDM: CPT | Mod: 25

## 2022-01-18 PROCEDURE — 80307 DRUG TEST PRSMV CHEM ANLYZR: CPT | Performed by: EMERGENCY MEDICINE

## 2022-01-18 PROCEDURE — 85025 COMPLETE CBC W/AUTO DIFF WBC: CPT | Performed by: EMERGENCY MEDICINE

## 2022-01-18 PROCEDURE — 85651 RBC SED RATE NONAUTOMATED: CPT | Performed by: EMERGENCY MEDICINE

## 2022-01-18 PROCEDURE — 63600175 PHARM REV CODE 636 W HCPCS: Performed by: EMERGENCY MEDICINE

## 2022-01-18 PROCEDURE — 25000003 PHARM REV CODE 250: Performed by: EMERGENCY MEDICINE

## 2022-01-18 PROCEDURE — 96361 HYDRATE IV INFUSION ADD-ON: CPT

## 2022-01-18 PROCEDURE — 80053 COMPREHEN METABOLIC PANEL: CPT | Performed by: EMERGENCY MEDICINE

## 2022-01-18 PROCEDURE — 86140 C-REACTIVE PROTEIN: CPT | Performed by: EMERGENCY MEDICINE

## 2022-01-18 PROCEDURE — 81003 URINALYSIS AUTO W/O SCOPE: CPT | Mod: 59 | Performed by: EMERGENCY MEDICINE

## 2022-01-18 PROCEDURE — 96374 THER/PROPH/DIAG INJ IV PUSH: CPT

## 2022-01-18 PROCEDURE — 86038 ANTINUCLEAR ANTIBODIES: CPT | Performed by: EMERGENCY MEDICINE

## 2022-01-18 RX ORDER — ONDANSETRON 2 MG/ML
4 INJECTION INTRAMUSCULAR; INTRAVENOUS
Status: COMPLETED | OUTPATIENT
Start: 2022-01-18 | End: 2022-01-18

## 2022-01-18 RX ORDER — ONDANSETRON 4 MG/1
4 TABLET, ORALLY DISINTEGRATING ORAL EVERY 6 HOURS PRN
Qty: 12 TABLET | Refills: 0 | Status: SHIPPED | OUTPATIENT
Start: 2022-01-18 | End: 2022-01-20

## 2022-01-18 RX ORDER — HYDROCODONE BITARTRATE AND ACETAMINOPHEN 5; 325 MG/1; MG/1
1 TABLET ORAL EVERY 6 HOURS PRN
Qty: 8 TABLET | Refills: 0 | Status: SHIPPED | OUTPATIENT
Start: 2022-01-18 | End: 2022-01-20

## 2022-01-18 RX ORDER — POTASSIUM CHLORIDE 20 MEQ/1
40 TABLET, EXTENDED RELEASE ORAL ONCE
Status: DISCONTINUED | OUTPATIENT
Start: 2022-01-18 | End: 2022-01-18 | Stop reason: HOSPADM

## 2022-01-18 RX ORDER — HYDROCODONE BITARTRATE AND ACETAMINOPHEN 5; 325 MG/1; MG/1
1 TABLET ORAL
Status: DISCONTINUED | OUTPATIENT
Start: 2022-01-18 | End: 2022-01-18 | Stop reason: HOSPADM

## 2022-01-18 RX ADMIN — ONDANSETRON 4 MG: 2 INJECTION INTRAMUSCULAR; INTRAVENOUS at 02:01

## 2022-01-18 RX ADMIN — SODIUM CHLORIDE 1000 ML: 0.9 INJECTION, SOLUTION INTRAVENOUS at 02:01

## 2022-01-18 NOTE — ED PROVIDER NOTES
"Encounter Date: 1/18/2022       History     Chief Complaint   Patient presents with    Vomiting    Diarrhea    Back Pain     Right side back pain    swollen lymph nodes     X6 months. Swollen lymph nodes under arms     Chief complaint is nausea vomiting for several hours along with right flank pain.  The patient not the best historian but states she is being evaluated for lymph node swelling in armpits and groin for the last 6 months.  She states she has had CT scans and other test foot "nobody is listening to her".  She denies any fever chills chest pain palpitation productive cough.  She denies trouble urinating.  She states nobody is listening to her however she has had CT scans per her discussion with me.  She had some lab work that was ordered on January 4th it has not been done.  It included a sed rate and basic labs.  Patient also complains of a discharge from her breast and she has pain to the left anterior chest near her breast.  She states she has an MRI scheduled to further evaluate that.        Review of patient's allergies indicates:   Allergen Reactions    Quetiapine Other (See Comments)     Dystonic reaction  Other reaction(s): Unknown    Aspirin     Chlorpromazine      Other reaction(s): Unknown    Gabapentin      Other reaction(s): Unknown    Haloperidol lactate      Other reaction(s): Unknown    Seroquel [quetiapine]     Pcn [penicillins] Rash     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 Brianna Ramires,     Obstetrics and Gynecology  [facility] [place] [facility] [place]          BLADDER SUSPENSION      CERVICAL FUSION       SECTION      CHOLECYSTECTOMY      CYSTOSCOPY WITH BIOPSY OF BLADDER N/A 2018    Procedure: CYSTOSCOPY, WITH BLADDER BIOPSY, WITH FULGURATION IF INDICATED;  Surgeon: Luna Slater MD;  Location: [facility] OR;  Service: Urology;  Laterality: N/A;    CYSTOSCOPY WITH HYDRODISTENSION OF BLADDER N/A 12/3/2019    Procedure: CYSTOSCOPY, WITH BLADDER HYDRODISTENSION;  Surgeon: Gumaro Mcgovern MD;  Location: [facility] OR;  Service: OB/GYN;  Laterality: N/A;    DILATION OF URETHRA N/A 2018    Procedure: DILATION, URETHRA;  Surgeon: Luna Slater MD;  Location: [facility] OR;  Service: Urology;  Laterality: N/A;    HYSTERECTOMY      KNEE SURGERY Bilateral     TIBIA FRACTURE SURGERY Right     tibia/ fibula repaired with pins and screws    TOE SURGERY Left      Family History   Problem Relation Age of Onset    Hypertension Father     Hypertension Mother     Stroke Mother     Breast cancer Mother     Breast cancer Maternal Grandmother      Social History     Tobacco Use    Smoking status: Current Every Day Smoker     Packs/day: 1.00     Types: Cigarettes, Vaping with nicotine    Smokeless tobacco: Never Used   Substance Use Topics    Alcohol use: No    Drug use: Yes     Types: Marijuana     Comment: last dose last week.     Review of Systems   Constitutional: Negative for chills and fever.   HENT: Negative for ear pain, rhinorrhea and sore throat.    Eyes: Negative for pain and visual disturbance.   Respiratory: Negative for cough and shortness of breath.    Cardiovascular: Negative for chest pain and palpitations.   Gastrointestinal: Positive for nausea and vomiting. Negative for abdominal pain, constipation and diarrhea.   Genitourinary: Negative for dysuria, frequency, hematuria and urgency.   Musculoskeletal: Negative for back pain, joint swelling and myalgias.   Skin: Negative for rash.   Neurological: Negative for dizziness, seizures,  weakness and headaches.   Psychiatric/Behavioral: Negative for dysphoric mood. The patient is not nervous/anxious.        Physical Exam     Initial Vitals   BP Pulse Resp Temp SpO2   01/18/22 0220 01/18/22 0220 01/18/22 0220 01/18/22 0224 01/18/22 0220   130/85 106 20 98.7 °F (37.1 °C) 98 %      MAP       --                Physical Exam    Nursing note and vitals reviewed.  Constitutional: She appears well-developed and well-nourished.   HENT:   Head: Normocephalic and atraumatic.   Nose: Nose normal.   Eyes: Conjunctivae, EOM and lids are normal. Pupils are equal, round, and reactive to light.   Neck: Trachea normal. Neck supple. No thyroid mass and no thyromegaly present.   Normal range of motion.  Cardiovascular: Normal rate, regular rhythm and normal heart sounds.   Pulmonary/Chest: Effort normal and breath sounds normal.   Abdominal: Abdomen is soft. Normal appearance. There is no abdominal tenderness.   Musculoskeletal:         General: Normal range of motion.      Cervical back: Normal range of motion and neck supple.      Comments: No flank pain to palpation right flank pain is area where she complains of soreness.     Neurological: She is alert and oriented to person, place, and time. She has normal strength and normal reflexes. No cranial nerve deficit or sensory deficit.   Skin: Skin is warm and dry.   No pain to palpation anterior chest.  No obvious masses noted to the anterior chest.   Psychiatric: She has a normal mood and affect. Her speech is normal and behavior is normal. Judgment and thought content normal.         ED Course   Procedures  Labs Reviewed   CBC W/ AUTO DIFFERENTIAL - Abnormal; Notable for the following components:       Result Value    MPV 8.8 (*)     All other components within normal limits   COMPREHENSIVE METABOLIC PANEL - Abnormal; Notable for the following components:    Potassium 3.2 (*)     Glucose 125 (*)     All other components within normal limits   DRUG SCREEN PANEL, URINE  EMERGENCY - Abnormal; Notable for the following components:    Benzodiazepines Presumptive Positive (*)     Amphetamine Screen, Ur Presumptive Positive (*)     All other components within normal limits    Narrative:     Specimen Source->Urine   SEDIMENTATION RATE   C-REACTIVE PROTEIN   URINALYSIS, REFLEX TO URINE CULTURE    Narrative:     Specimen Source->Urine   BUSHRA PROFILE I (SCREEN) W/ REFLEX          Imaging Results    None          Medications   HYDROcodone-acetaminophen 5-325 mg per tablet 1 tablet (1 tablet Oral Not Given 1/18/22 0400)   potassium chloride SA CR tablet 40 mEq (40 mEq Oral Not Given 1/18/22 0500)   sodium chloride 0.9% bolus 1,000 mL (0 mLs Intravenous Stopped 1/18/22 0402)   ondansetron injection 4 mg (4 mg Intravenous Given 1/18/22 0242)     Medical Decision Making:   ED Management:  The patient had a negative lab workup.  She does have slight pain to palpation right flank with no skin color changes.  I do not feel that this is a kidney stone.  Her urinalysis is negative.  She does have classic symptoms of a kidney stone.  I discussed her labs with her.  It also noted that she ran out of her pain medicine prescriptions several days ago.  She did present nausea vomiting even though she has not vomited in the ER here at all.  She will be discharged with Zofran for nausea.  I did the lab work that she stated her physician requested in her workup of her chronic lymph node situation.  On my exam however she has no lymph nodes that I could palpate in the axillary or femoral area.  She has no abdominal pain to palpation.  She will be discharged at this point.                      Clinical Impression:   Final diagnoses:  [R11.10] Vomiting, intractability of vomiting not specified, presence of nausea not specified, unspecified vomiting type (Primary)          ED Disposition Condition    Discharge Stable        ED Prescriptions     Medication Sig Dispense Start Date End Date Auth. Provider     ondansetron (ZOFRAN-ODT) 4 MG TbDL Take 1 tablet (4 mg total) by mouth every 6 (six) hours as needed. 12 tablet 1/18/2022  Sb Bentley MD    HYDROcodone-acetaminophen (NORCO) 5-325 mg per tablet Take 1 tablet by mouth every 6 (six) hours as needed for Pain. 8 tablet 1/18/2022 1/20/2022 Sb Bentley MD        Follow-up Information     Follow up With Specialties Details Why Contact Info    Alberto Fairchild MD Internal Medicine Schedule an appointment as soon as possible for a visit in 3 days  [address] [place] [address]  [address]  [facility] [address]  [phone]             Sb Bentley MD  01/18/22 0450

## 2022-01-18 NOTE — ED NOTES
Adult Physical Assessment  LOC: Rosalina Jacobo, 46 y.o. female verified via two identifiers.  The patient is awake, alert, oriented and speaking appropriately at this time.  APPEARANCE: Patient resting comfortably and appears to be in no acute distress at this time. Patient is clean and well groomed, patient's clothing is properly fastened.  SKIN:The skin is warm and dry, color consistent with ethnicity, patient has normal skin turgor and moist mucus membranes, skin intact, no breakdown or brusing noted.  MUSCULOSKELETAL: Patient moving all extremities well, no obvious swelling or deformities noted.  RESPIRATORY: Airway is open and patent, respirations are spontaneous, patient has a normal effort and rate, no accessory muscle use noted.  CARDIAC: Patient has a normal rate and rhythm, no periphreal edema noted in any extremity, capillary refill < 3 seconds in all extremities  ABDOMEN: Soft and non tender to palpation, no abdominal distention noted. Bowel sounds present in all four quadrants.  NEUROLOGIC: Eyes open spontaneously, behavior appropriate to situation, follows commands, facial expression symmetrical, bilateral hand grasp equal and even, purposeful motor response noted, normal sensation in all extremities when touched with a finger.

## 2022-01-18 NOTE — DISCHARGE INSTRUCTIONS
Zofran for nausea.  I drew the labs that Dr. Fairchild wanted you to have.  Please follow-up with him so you can discuss results and further evaluate your chronic pain in your back as well as the situation with your breast and lymph nodes.

## 2022-01-19 LAB — ANA TITR SER IF: NEGATIVE {TITER}

## 2022-01-20 ENCOUNTER — HOSPITAL ENCOUNTER (OUTPATIENT)
Facility: HOSPITAL | Age: 47
Discharge: HOME | End: 2022-01-21
Attending: EMERGENCY MEDICINE | Admitting: INTERNAL MEDICINE
Payer: MEDICARE

## 2022-01-20 DIAGNOSIS — K85.90 ACUTE PANCREATITIS WITHOUT INFECTION OR NECROSIS, UNSPECIFIED PANCREATITIS TYPE: Primary | ICD-10-CM

## 2022-01-20 DIAGNOSIS — R07.9 CHEST PAIN: ICD-10-CM

## 2022-01-20 DIAGNOSIS — R10.9 ABDOMINAL PAIN, UNSPECIFIED ABDOMINAL LOCATION: ICD-10-CM

## 2022-01-20 PROBLEM — K85.00 IDIOPATHIC ACUTE PANCREATITIS: Status: ACTIVE | Noted: 2022-01-20

## 2022-01-20 PROBLEM — E87.6 HYPOKALEMIA: Status: ACTIVE | Noted: 2022-01-20

## 2022-01-20 LAB
ALBUMIN SERPL BCP-MCNC: 4.1 G/DL (ref 3.5–5.2)
ALP SERPL-CCNC: 61 U/L (ref 55–135)
ALT SERPL W/O P-5'-P-CCNC: 17 U/L (ref 10–44)
ANION GAP SERPL CALC-SCNC: 15 MMOL/L (ref 8–16)
AST SERPL-CCNC: 21 U/L (ref 10–40)
BACTERIA #/AREA URNS HPF: NEGATIVE /HPF
BASOPHILS # BLD AUTO: 0.08 K/UL (ref 0–0.2)
BASOPHILS NFR BLD: 1 % (ref 0–1.9)
BILIRUB SERPL-MCNC: 0.3 MG/DL (ref 0.1–1)
BILIRUB UR QL STRIP: NEGATIVE
BUN SERPL-MCNC: 9 MG/DL (ref 6–20)
CALCIUM SERPL-MCNC: 9.1 MG/DL (ref 8.7–10.5)
CHLORIDE SERPL-SCNC: 108 MMOL/L (ref 95–110)
CLARITY UR: CLEAR
CO2 SERPL-SCNC: 19 MMOL/L (ref 23–29)
COLOR UR: YELLOW
CREAT SERPL-MCNC: 0.8 MG/DL (ref 0.5–1.4)
DIFFERENTIAL METHOD: ABNORMAL
EOSINOPHIL # BLD AUTO: 0.3 K/UL (ref 0–0.5)
EOSINOPHIL NFR BLD: 4.1 % (ref 0–8)
ERYTHROCYTE [DISTWIDTH] IN BLOOD BY AUTOMATED COUNT: 12.8 % (ref 11.5–14.5)
EST. GFR  (AFRICAN AMERICAN): >60 ML/MIN/1.73 M^2
EST. GFR  (NON AFRICAN AMERICAN): >60 ML/MIN/1.73 M^2
GLUCOSE SERPL-MCNC: 126 MG/DL (ref 70–110)
GLUCOSE UR QL STRIP: NEGATIVE
HCT VFR BLD AUTO: 44.2 % (ref 37–48.5)
HGB BLD-MCNC: 14.2 G/DL (ref 12–16)
HGB UR QL STRIP: NEGATIVE
HYALINE CASTS #/AREA URNS LPF: 8 /LPF
IMM GRANULOCYTES # BLD AUTO: 0.01 K/UL (ref 0–0.04)
IMM GRANULOCYTES NFR BLD AUTO: 0.1 % (ref 0–0.5)
KETONES UR QL STRIP: NEGATIVE
LEUKOCYTE ESTERASE UR QL STRIP: NEGATIVE
LIPASE SERPL-CCNC: 144 U/L (ref 4–60)
LYMPHOCYTES # BLD AUTO: 2.9 K/UL (ref 1–4.8)
LYMPHOCYTES NFR BLD: 36.8 % (ref 18–48)
MCH RBC QN AUTO: 28.7 PG (ref 27–31)
MCHC RBC AUTO-ENTMCNC: 32.1 G/DL (ref 32–36)
MCV RBC AUTO: 89 FL (ref 82–98)
MICROSCOPIC COMMENT: ABNORMAL
MONOCYTES # BLD AUTO: 0.8 K/UL (ref 0.3–1)
MONOCYTES NFR BLD: 9.6 % (ref 4–15)
NEUTROPHILS # BLD AUTO: 3.8 K/UL (ref 1.8–7.7)
NEUTROPHILS NFR BLD: 48.4 % (ref 38–73)
NITRITE UR QL STRIP: NEGATIVE
NRBC BLD-RTO: 0 /100 WBC
PH UR STRIP: 7 [PH] (ref 5–8)
PLATELET # BLD AUTO: 358 K/UL (ref 150–450)
PMV BLD AUTO: 9 FL (ref 9.2–12.9)
POTASSIUM SERPL-SCNC: 3.3 MMOL/L (ref 3.5–5.1)
PROT SERPL-MCNC: 7 G/DL (ref 6–8.4)
PROT UR QL STRIP: ABNORMAL
RBC # BLD AUTO: 4.95 M/UL (ref 4–5.4)
RBC #/AREA URNS HPF: 1 /HPF (ref 0–4)
SARS-COV-2 RDRP RESP QL NAA+PROBE: NEGATIVE
SODIUM SERPL-SCNC: 142 MMOL/L (ref 136–145)
SP GR UR STRIP: <1.005 (ref 1–1.03)
SQUAMOUS #/AREA URNS HPF: 3 /HPF
URN SPEC COLLECT METH UR: ABNORMAL
UROBILINOGEN UR STRIP-ACNC: NEGATIVE EU/DL
WBC # BLD AUTO: 7.85 K/UL (ref 3.9–12.7)
WBC #/AREA URNS HPF: 3 /HPF (ref 0–5)

## 2022-01-20 PROCEDURE — 36415 COLL VENOUS BLD VENIPUNCTURE: CPT | Performed by: EMERGENCY MEDICINE

## 2022-01-20 PROCEDURE — 96376 TX/PRO/DX INJ SAME DRUG ADON: CPT

## 2022-01-20 PROCEDURE — 96374 THER/PROPH/DIAG INJ IV PUSH: CPT

## 2022-01-20 PROCEDURE — 83690 ASSAY OF LIPASE: CPT | Performed by: EMERGENCY MEDICINE

## 2022-01-20 PROCEDURE — U0002 COVID-19 LAB TEST NON-CDC: HCPCS | Performed by: EMERGENCY MEDICINE

## 2022-01-20 PROCEDURE — 85025 COMPLETE CBC W/AUTO DIFF WBC: CPT | Performed by: EMERGENCY MEDICINE

## 2022-01-20 PROCEDURE — G0378 HOSPITAL OBSERVATION PER HR: HCPCS | Mod: CS

## 2022-01-20 PROCEDURE — 63600175 PHARM REV CODE 636 W HCPCS: Performed by: INTERNAL MEDICINE

## 2022-01-20 PROCEDURE — 25000003 PHARM REV CODE 250: Performed by: INTERNAL MEDICINE

## 2022-01-20 PROCEDURE — 25000003 PHARM REV CODE 250: Performed by: EMERGENCY MEDICINE

## 2022-01-20 PROCEDURE — 80053 COMPREHEN METABOLIC PANEL: CPT | Performed by: EMERGENCY MEDICINE

## 2022-01-20 PROCEDURE — 99285 EMERGENCY DEPT VISIT HI MDM: CPT | Mod: 25,CS

## 2022-01-20 PROCEDURE — 63600175 PHARM REV CODE 636 W HCPCS: Performed by: EMERGENCY MEDICINE

## 2022-01-20 PROCEDURE — 82787 IGG 1 2 3 OR 4 EACH: CPT | Mod: 91 | Performed by: EMERGENCY MEDICINE

## 2022-01-20 PROCEDURE — 96375 TX/PRO/DX INJ NEW DRUG ADDON: CPT

## 2022-01-20 PROCEDURE — 81001 URINALYSIS AUTO W/SCOPE: CPT | Performed by: EMERGENCY MEDICINE

## 2022-01-20 PROCEDURE — 25500020 PHARM REV CODE 255: Performed by: EMERGENCY MEDICINE

## 2022-01-20 PROCEDURE — G0378 HOSPITAL OBSERVATION PER HR: HCPCS

## 2022-01-20 RX ORDER — POTASSIUM CHLORIDE 20 MEQ/1
20 TABLET, EXTENDED RELEASE ORAL ONCE
Status: COMPLETED | OUTPATIENT
Start: 2022-01-20 | End: 2022-01-20

## 2022-01-20 RX ORDER — POTASSIUM CHLORIDE 7.45 MG/ML
40 INJECTION INTRAVENOUS
Status: DISCONTINUED | OUTPATIENT
Start: 2022-01-20 | End: 2022-01-21 | Stop reason: HOSPADM

## 2022-01-20 RX ORDER — RIZATRIPTAN BENZOATE 10 MG/1
1 TABLET ORAL ONCE
COMMUNITY
End: 2022-10-02

## 2022-01-20 RX ORDER — POTASSIUM CHLORIDE 20 MEQ/1
40 TABLET, EXTENDED RELEASE ORAL
Status: DISCONTINUED | OUTPATIENT
Start: 2022-01-20 | End: 2022-01-21 | Stop reason: HOSPADM

## 2022-01-20 RX ORDER — NALOXONE HCL 0.4 MG/ML
0.02 VIAL (ML) INJECTION
Status: DISCONTINUED | OUTPATIENT
Start: 2022-01-20 | End: 2022-01-21 | Stop reason: HOSPADM

## 2022-01-20 RX ORDER — ONDANSETRON 2 MG/ML
4 INJECTION INTRAMUSCULAR; INTRAVENOUS EVERY 6 HOURS PRN
Status: DISCONTINUED | OUTPATIENT
Start: 2022-01-20 | End: 2022-01-21 | Stop reason: HOSPADM

## 2022-01-20 RX ORDER — POTASSIUM CHLORIDE 7.45 MG/ML
20 INJECTION INTRAVENOUS
Status: DISCONTINUED | OUTPATIENT
Start: 2022-01-20 | End: 2022-01-21 | Stop reason: HOSPADM

## 2022-01-20 RX ORDER — SODIUM CHLORIDE, SODIUM LACTATE, POTASSIUM CHLORIDE, CALCIUM CHLORIDE 600; 310; 30; 20 MG/100ML; MG/100ML; MG/100ML; MG/100ML
INJECTION, SOLUTION INTRAVENOUS CONTINUOUS
Status: DISCONTINUED | OUTPATIENT
Start: 2022-01-20 | End: 2022-01-21

## 2022-01-20 RX ORDER — HYDROXYZINE PAMOATE 50 MG/1
2 CAPSULE ORAL 2 TIMES DAILY
COMMUNITY
End: 2022-01-20

## 2022-01-20 RX ORDER — DIAZEPAM 5 MG/1
10 TABLET ORAL EVERY 8 HOURS PRN
Status: DISCONTINUED | OUTPATIENT
Start: 2022-01-20 | End: 2022-01-21

## 2022-01-20 RX ORDER — MAGNESIUM SULFATE HEPTAHYDRATE 40 MG/ML
4 INJECTION, SOLUTION INTRAVENOUS
Status: DISCONTINUED | OUTPATIENT
Start: 2022-01-20 | End: 2022-01-21 | Stop reason: HOSPADM

## 2022-01-20 RX ORDER — POTASSIUM CHLORIDE 20 MEQ/1
20 TABLET, EXTENDED RELEASE ORAL
Status: DISCONTINUED | OUTPATIENT
Start: 2022-01-20 | End: 2022-01-21 | Stop reason: HOSPADM

## 2022-01-20 RX ORDER — ONDANSETRON 2 MG/ML
4 INJECTION INTRAMUSCULAR; INTRAVENOUS
Status: COMPLETED | OUTPATIENT
Start: 2022-01-20 | End: 2022-01-20

## 2022-01-20 RX ORDER — SODIUM CHLORIDE 9 MG/ML
INJECTION, SOLUTION INTRAVENOUS
Status: COMPLETED | OUTPATIENT
Start: 2022-01-20 | End: 2022-01-20

## 2022-01-20 RX ORDER — LANOLIN ALCOHOL/MO/W.PET/CERES
800 CREAM (GRAM) TOPICAL
Status: DISCONTINUED | OUTPATIENT
Start: 2022-01-20 | End: 2022-01-21 | Stop reason: HOSPADM

## 2022-01-20 RX ORDER — LURASIDONE HYDROCHLORIDE 60 MG/1
TABLET, FILM COATED ORAL
COMMUNITY
End: 2022-01-20

## 2022-01-20 RX ORDER — MAGNESIUM SULFATE 1 G/100ML
1 INJECTION INTRAVENOUS
Status: DISCONTINUED | OUTPATIENT
Start: 2022-01-20 | End: 2022-01-21 | Stop reason: HOSPADM

## 2022-01-20 RX ORDER — LAMOTRIGINE 150 MG/1
TABLET ORAL
COMMUNITY
End: 2022-01-20

## 2022-01-20 RX ORDER — POTASSIUM CHLORIDE 7.45 MG/ML
10 INJECTION INTRAVENOUS ONCE
Status: DISCONTINUED | OUTPATIENT
Start: 2022-01-20 | End: 2022-01-20

## 2022-01-20 RX ORDER — MAGNESIUM SULFATE HEPTAHYDRATE 40 MG/ML
2 INJECTION, SOLUTION INTRAVENOUS
Status: DISCONTINUED | OUTPATIENT
Start: 2022-01-20 | End: 2022-01-21 | Stop reason: HOSPADM

## 2022-01-20 RX ORDER — ACETAMINOPHEN 325 MG/1
650 TABLET ORAL EVERY 8 HOURS PRN
Status: DISCONTINUED | OUTPATIENT
Start: 2022-01-20 | End: 2022-01-21 | Stop reason: HOSPADM

## 2022-01-20 RX ORDER — DULOXETIN HYDROCHLORIDE 20 MG/1
20 CAPSULE, DELAYED RELEASE ORAL NIGHTLY
Status: DISCONTINUED | OUTPATIENT
Start: 2022-01-20 | End: 2022-01-21

## 2022-01-20 RX ORDER — METHYLPHENIDATE HYDROCHLORIDE 5 MG/1
10 TABLET ORAL DAILY
Status: DISCONTINUED | OUTPATIENT
Start: 2022-01-21 | End: 2022-01-21

## 2022-01-20 RX ORDER — MORPHINE SULFATE 2 MG/ML
2 INJECTION, SOLUTION INTRAMUSCULAR; INTRAVENOUS
Status: COMPLETED | OUTPATIENT
Start: 2022-01-20 | End: 2022-01-20

## 2022-01-20 RX ORDER — MORPHINE SULFATE 2 MG/ML
2 INJECTION, SOLUTION INTRAMUSCULAR; INTRAVENOUS EVERY 4 HOURS PRN
Status: DISCONTINUED | OUTPATIENT
Start: 2022-01-20 | End: 2022-01-21

## 2022-01-20 RX ORDER — TALC
6 POWDER (GRAM) TOPICAL NIGHTLY PRN
Status: DISCONTINUED | OUTPATIENT
Start: 2022-01-20 | End: 2022-01-21

## 2022-01-20 RX ORDER — METHYLPHENIDATE HYDROCHLORIDE 5 MG/1
10 TABLET ORAL 2 TIMES DAILY WITH MEALS
Status: DISCONTINUED | OUTPATIENT
Start: 2022-01-20 | End: 2022-01-20

## 2022-01-20 RX ORDER — VILAZODONE HYDROCHLORIDE 10 MG-20MG
KIT ORAL
COMMUNITY
End: 2022-01-20

## 2022-01-20 RX ORDER — MIRTAZAPINE 45 MG/1
1 TABLET, FILM COATED ORAL NIGHTLY
COMMUNITY
End: 2022-01-20

## 2022-01-20 RX ADMIN — MORPHINE SULFATE 2 MG: 2 INJECTION, SOLUTION INTRAMUSCULAR; INTRAVENOUS at 05:01

## 2022-01-20 RX ADMIN — POTASSIUM CHLORIDE 20 MEQ: 20 TABLET, EXTENDED RELEASE ORAL at 01:01

## 2022-01-20 RX ADMIN — DULOXETINE HYDROCHLORIDE 20 MG: 20 CAPSULE, DELAYED RELEASE ORAL at 08:01

## 2022-01-20 RX ADMIN — ONDANSETRON 4 MG: 2 INJECTION INTRAMUSCULAR; INTRAVENOUS at 11:01

## 2022-01-20 RX ADMIN — SODIUM CHLORIDE, SODIUM LACTATE, POTASSIUM CHLORIDE, AND CALCIUM CHLORIDE: .6; .31; .03; .02 INJECTION, SOLUTION INTRAVENOUS at 02:01

## 2022-01-20 RX ADMIN — ONDANSETRON 4 MG: 2 INJECTION INTRAMUSCULAR; INTRAVENOUS at 01:01

## 2022-01-20 RX ADMIN — IOHEXOL 100 ML: 350 INJECTION, SOLUTION INTRAVENOUS at 08:01

## 2022-01-20 RX ADMIN — MORPHINE SULFATE 2 MG: 2 INJECTION, SOLUTION INTRAMUSCULAR; INTRAVENOUS at 11:01

## 2022-01-20 RX ADMIN — SODIUM CHLORIDE: 0.9 INJECTION, SOLUTION INTRAVENOUS at 11:01

## 2022-01-20 RX ADMIN — MORPHINE SULFATE 2 MG: 2 INJECTION, SOLUTION INTRAMUSCULAR; INTRAVENOUS at 01:01

## 2022-01-20 NOTE — ED PROVIDER NOTES
Encounter Date: 2022       History     Chief Complaint   Patient presents with    Generalized Body Aches     Patient reports muscle pain, n/v/d x 6 months      Patient here reported muscle pain nausea vomiting diarrhea onset over last 6 months symptoms have steadily worsened she reports decreased appetite chills but no rib recorded fever she denies any abdominal pain but on physical exam she does have noted tenderness to her abdomen patient has had a cholecystectomy in the past appendectomy        Review of patient's allergies indicates:   Allergen Reactions    Quetiapine Other (See Comments)     Dystonic reaction  Other reaction(s): Unknown    Aspirin     Chlorpromazine      Other reaction(s): Unknown    Gabapentin      Other reaction(s): Unknown    Haloperidol lactate      Other reaction(s): Unknown    Seroquel [quetiapine]     Pcn [penicillins] Rash     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    Endometriosis     Headache     Heartburn     Hypotension     Infertility, female     Insomnia     Migraine headache     PTSD (post-traumatic stress disorder)     Trauma      Past Surgical History:   Procedure Laterality Date    APPENDECTOMY      BLADDER SUSPENSION      CERVICAL FUSION       SECTION      CHOLECYSTECTOMY      CYSTOSCOPY WITH BIOPSY OF BLADDER N/A 2018    Procedure: CYSTOSCOPY, WITH BLADDER BIOPSY, WITH FULGURATION IF INDICATED;  Surgeon: Luna Slater MD;  Location: [facility] OR;  Service: Urology;  Laterality: N/A;    CYSTOSCOPY WITH HYDRODISTENSION OF BLADDER N/A 12/3/2019    Procedure: CYSTOSCOPY, WITH BLADDER HYDRODISTENSION;  Surgeon: Gumaro Mcgovern MD;  Location: [facility] OR;  Service: OB/GYN;  Laterality: N/A;     DILATION OF URETHRA N/A 12/12/2018    Procedure: DILATION, URETHRA;  Surgeon: Luna Slater MD;  Location: [facility];  Service: Urology;  Laterality: N/A;    ESOPHAGOGASTRODUODENOSCOPY N/A 1/21/2022    Procedure: EGD (ESOPHAGOGASTRODUODENOSCOPY);  Surgeon: Satinder Rdz MD;  Location: [facility];  Service: Endoscopy;  Laterality: N/A;    HYSTERECTOMY      KNEE SURGERY Bilateral     TIBIA FRACTURE SURGERY Right     tibia/ fibula repaired with pins and screws    TOE SURGERY Left      Family History   Problem Relation Age of Onset    Hypertension Father     Hypertension Mother     Stroke Mother     Breast cancer Mother     Breast cancer Maternal Grandmother      Social History     Tobacco Use    Smoking status: Current Every Day Smoker     Packs/day: 1.00     Types: Cigarettes, Vaping with nicotine    Smokeless tobacco: Never Used   Substance Use Topics    Alcohol use: No    Drug use: Yes     Types: Marijuana     Comment: last dose last week.     Review of Systems   Constitutional: Positive for activity change, appetite change, chills and fatigue. Negative for fever.   HENT: Negative for congestion.    Respiratory: Negative for chest tightness and shortness of breath.    Cardiovascular: Negative for chest pain.   Gastrointestinal: Negative for abdominal pain.   All other systems reviewed and are negative.      Physical Exam     Initial Vitals [01/20/22 0708]   BP Pulse Resp Temp SpO2   121/74 81 18 98 °F (36.7 °C) 99 %      MAP       --         Physical Exam    Constitutional: She appears well-developed and well-nourished. No distress.   HENT:   Head: Normocephalic and atraumatic.   Right Ear: External ear normal.   Left Ear: External ear normal.   Mouth/Throat: Oropharynx is clear and moist.   Eyes: Conjunctivae and EOM are normal. Pupils are equal, round, and reactive to light.   Neck: Neck supple.   Normal range of motion.  Cardiovascular: Normal rate, regular rhythm, normal heart sounds  and intact distal pulses.   Pulmonary/Chest: Breath sounds normal. No respiratory distress.   Abdominal: Abdomen is soft. She exhibits no distension. Bowel sounds are decreased. There is abdominal tenderness in the right upper quadrant, epigastric area and left upper quadrant. There is guarding.   Musculoskeletal:         General: No edema. Normal range of motion.      Cervical back: Normal range of motion and neck supple.     Neurological: She is alert and oriented to person, place, and time. She has normal strength. GCS score is 15. GCS eye subscore is 4. GCS verbal subscore is 5. GCS motor subscore is 6.   Skin: Skin is warm and dry. Capillary refill takes less than 2 seconds. No rash noted.   Psychiatric: She has a normal mood and affect. Her behavior is normal.         ED Course   Procedures  Labs Reviewed   CBC W/ AUTO DIFFERENTIAL - Abnormal; Notable for the following components:       Result Value    MPV 9.0 (*)     All other components within normal limits   COMPREHENSIVE METABOLIC PANEL - Abnormal; Notable for the following components:    Potassium 3.3 (*)     CO2 19 (*)     Glucose 126 (*)     All other components within normal limits   LIPASE - Abnormal; Notable for the following components:    Lipase 144 (*)     All other components within normal limits   URINALYSIS, REFLEX TO URINE CULTURE - Abnormal; Notable for the following components:    Specific Gravity, UA <1.005 (*)     Protein, UA 1+ (*)     All other components within normal limits    Narrative:     Specimen Source->Urine   URINALYSIS MICROSCOPIC - Abnormal; Notable for the following components:    Hyaline Casts, UA 8 (*)     All other components within normal limits    Narrative:     Specimen Source->Urine   SARS-COV-2 RNA AMPLIFICATION, QUAL   IGG 1, 2, 3, AND 4          Imaging Results          CT Abdomen Pelvis With Contrast (Final result)  Result time 01/20/22 08:54:23    Final result by Papo Alvarado MD (01/20/22 08:54:23)                  Narrative:    CMS MANDATED QUALITY DATA - CT RADIATION - 436    All CT scans at this facility utilize dose modulation, iterative reconstruction, and/or weight based dosing when appropriate to reduce radiation dose to as low as reasonably achievable.        Reason: Pancreatitis, acute, severe    TECHNIQUE: CT abdomen and pelvis with 100 mL Omnipaque 350.    COMPARISON: 8/31/2021    CT ABDOMEN:  Partially visualized lung bases are clear.    Surgical clips in gallbladder fossa indicate cholecystectomy. Pancreas is normal, without abnormal parenchymal enhancement or peripancreatic fat stranding.    Liver, spleen, adrenals, and left kidney are normal. Multifocal cortical defects in right kidney suggests scarring. Aorta is normal.    No intestinal abnormality. Appendix not identified, but no secondary signs of appendicitis are evident. No free intraperitoneal gas or fluid. Unilateral right L5 pars defect incidentally noted. Schmorl's node affects superior endplate of L1.    CT PELVIS:  Bladder is normal. Uterus has been removed. No adnexal mass or free pelvic fluid.    IMPRESSION:    1. No acute findings throughout the abdomen and pelvis. Specifically, the pancreas is normal on CT.  2. Chronic right renal cortical scarring, unchanged.    Electronically signed by:  Papo Alvarado MD  1/20/2022 8:54 AM CST Workstation: [id number]                               Medications   iohexoL (OMNIPAQUE 350) injection 100 mL (100 mLs Intravenous Given 1/20/22 0823)   morphine injection 2 mg (2 mg Intravenous Given 1/20/22 1137)   ondansetron injection 4 mg (4 mg Intravenous Given 1/20/22 1137)   0.9%  NaCl infusion ( Intravenous New Bag 1/20/22 1136)   potassium chloride SA CR tablet 20 mEq (20 mEq Oral Given 1/20/22 1324)   dexamethasone injection 6 mg (6 mg Intravenous Given 1/21/22 1059)     Medical Decision Making:   ED Management:  Patient has evidence of pancreatitis on physical in laboratory evaluation CT scan the patient's  abdomen pelvis shows no evidence of harjeet pancreatic inflammation patient received IV fluid resuscitation pain control and nausea control emergency department I discussed patient's findings with Dr. Mason who will evaluate patient ER for admission                      Clinical Impression:   Final diagnoses:  [K85.90] Acute pancreatitis without infection or necrosis, unspecified pancreatitis type (Primary)          ED Disposition Condition    Observation               Taz Angel MD  01/20/22 1215       Taz Angel MD  02/25/22 2603

## 2022-01-20 NOTE — H&P
[facility] - Emergency Dept  Hospital Medicine  History & Physical    Patient Name: Rosalina Jacobo  MRN: [id number]  Patient Class: OP- Observation  Admission Date: 1/20/2022  Attending Physician: Marcin Mason MD  Primary Care Provider: Alberto Fairchild MD         Patient information was obtained from patient, past medical records and ER records.     Subjective:     Principal Problem:Idiopathic acute pancreatitis    Chief Complaint:   Chief Complaint   Patient presents with    Generalized Body Aches     Patient reports muscle pain, n/v/d x 6 months         HPI: Patient is a 46-year-old  female with ADHD, bipolar disorder type 1, neurodermatosis who presented to the ED with chief complaint of intractable abdominal pain.    She was seen in our ED for similar symptoms 2 days ago with no improvement prompting her to return to the ED.  She reports history of intermittent chronic abdominal pain.  However over the last week she has been experiencing severe sharp constant epigastric pain.  Occasional radiation to the right and to the back.  Associated with diarrhea, several episodes of nausea and vomiting.  Appetite is poor and is unable to keep anything down.  Symptoms are worse when she sits up.  No improvement with ibuprofen that she tried.  She also tried marijuana with no relief.  She denies fever or chills. She underwent EGD and colonoscopy about 3 months ago which she reports as being unremarkable.  Her PCP is working her up for lymphadenopathy and left breast discharge and she has an MRI of the abdomen and MRI of the breast scheduled on outpatient basis.  She has history of cholecystectomy and appendectomy.    Rest of the 10 point review of systems is negative except as mentioned above.      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    Endometriosis     Headache     Heartburn     Hypotension     Infertility, female     Insomnia     Migraine headache     PTSD (post-traumatic stress disorder)     Trauma        Past Surgical History:   Procedure Laterality Date    APPENDECTOMY      BLADDER SUSPENSION      CERVICAL FUSION       SECTION      CHOLECYSTECTOMY      CYSTOSCOPY WITH BIOPSY OF BLADDER N/A 2018    Procedure: CYSTOSCOPY, WITH BLADDER BIOPSY, WITH FULGURATION IF INDICATED;  Surgeon: Luna Slater MD;  Location: [facility] OR;  Service: Urology;  Laterality: N/A;    CYSTOSCOPY WITH HYDRODISTENSION OF BLADDER N/A 12/3/2019    Procedure: CYSTOSCOPY, WITH BLADDER HYDRODISTENSION;  Surgeon: Gumaro Mcgovern MD;  Location: [facility] OR;  Service: OB/GYN;  Laterality: N/A;    DILATION OF URETHRA N/A 2018    Procedure: DILATION, URETHRA;  Surgeon: Luna Slater MD;  Location: [facility] OR;  Service: Urology;  Laterality: N/A;    HYSTERECTOMY      KNEE SURGERY Bilateral     TIBIA FRACTURE SURGERY Right     tibia/ fibula repaired with pins and screws    TOE SURGERY Left        Review of patient's allergies indicates:   Allergen Reactions    Quetiapine Other (See Comments)     Dystonic reaction  Other reaction(s): Unknown    Aspirin     Chlorpromazine      Other reaction(s): Unknown    Gabapentin      Other reaction(s): Unknown    Haloperidol lactate      Other reaction(s): Unknown    Seroquel [quetiapine]     Pcn [penicillins] Rash       Current Facility-Administered Medications on File Prior to Encounter   Medication    lactated ringers infusion    sodium chloride 0.9% flush 3 mL     Current Outpatient Medications on File Prior to Encounter   Medication Sig    cariprazine (VRAYLAR) 3 mg Cap Take 1 capsule (3 mg total) by mouth once daily.    dextroamphetamine-amphetamine (ADDERALL) 15 mg tablet  Take 1 tablet by mouth 3 times daily    diazePAM (VALIUM) 10 MG Tab Take 10 mg by mouth every 8 (eight) hours as needed.    DULoxetine (CYMBALTA) 20 MG capsule Take 1 capsule (20 mg total) by mouth once daily.    ergocalciferol (ERGOCALCIFEROL) 50,000 unit Cap Take one capsule by mouth once per week (Patient taking differently: Take 50,000 Units by mouth every 7 days.)    ondansetron (ZOFRAN) 4 MG tablet Take 1 tablet (4 mg total) by mouth every 4 (four) hours.    apixaban (ELIQUIS) 5 mg Tab Take 1 tablet by mouth 2 (two) times a day.    cariprazine (VRAYLAR) 3 mg Cap 1 capsule    cariprazine (VRAYLAR) 3 mg Cap Take 1 capsule by mouth once daily    clindamycin (CLEOCIN) 300 MG capsule Take 1 capsule by mouth every 12 hours for 5 days    diazePAM (VALIUM) 10 MG Tab Take 1 tablet (10 mg total) by mouth every 8 (eight) hours as needed.    diazePAM (VALIUM) 10 MG Tab Take 1 tablet by mouth every 8 hours as needed for anxiety    dicyclomine (BENTYL) 20 mg tablet Take 1 tablet by mouth four times daily.    DULoxetine (CYMBALTA) 20 MG capsule duloxetine 20 mg capsule,delayed release   TAKE 1 CAPSULE BY MOUTH ONCE DAILY    DULoxetine (CYMBALTA) 20 MG capsule 1 capsule Orally Once a day 90 day(s)    HYDROcodone-acetaminophen (NORCO) 5-325 mg per tablet Take 1 tablet by mouth every 6 (six) hours as needed for Pain.    hydrOXYzine pamoate (VISTARIL) 50 MG Cap Take 2 capsules by mouth 2 (two) times a day.    lamoTRIgine (LAMICTAL) 150 MG Tab 1 tablet    levoFLOXacin (LEVAQUIN) 500 MG tablet Take 1 tablet by mouth once daily for 10 days    lurasidone (LATUDA) 60 mg Tab tablet 1/2 tablet with food    methylPREDNISolone (MEDROL DOSEPACK) 4 mg tablet Take as directed on package for 6 days    mirtazapine (REMERON) 45 MG tablet Take 1 tablet by mouth nightly.    montelukast (SINGULAIR) 10 mg tablet 1 tablet    ondansetron (ZOFRAN-ODT) 4 MG TbDL Take 1 tablet (4 mg total) by mouth every 6 (six) hours as needed.     promethazine (PHENERGAN) 25 MG suppository Place 1 suppository (25 mg total) rectally every 6 (six) hours as needed for Nausea.    promethazine (PHENERGAN) 6.25 mg/5 mL syrup Take 10 mLs by mouth every 12 hours as needed for 7 days    rizatriptan (MAXALT) 10 MG tablet Take 1 tablet by mouth once.    sulfamethoxazole-trimethoprim 800-160mg (BACTRIM DS) 800-160 mg Tab Take 1 tablet by mouth twice daily for 10 days    vilazodone (VIIBRYD) 10 mg (7)- 20 mg (23) DsPk as directed     Family History     Problem Relation (Age of Onset)    Breast cancer Mother, Maternal Grandmother    Hypertension Father, Mother    Stroke Mother        Tobacco Use    Smoking status: Current Every Day Smoker     Packs/day: 1.00     Types: Cigarettes, Vaping with nicotine    Smokeless tobacco: Never Used   Substance and Sexual Activity    Alcohol use: No    Drug use: Yes     Types: Marijuana     Comment: last dose last week.    Sexual activity: Yes     Partners: Male     Birth control/protection: See Surgical Hx       Objective:     Vital Signs (Most Recent):  Temp: 98 °F (36.7 °C) (01/20/22 0708)  Pulse: 61 (01/20/22 1200)  Resp: 20 (01/20/22 1137)  BP: 91/61 (01/20/22 1200)  SpO2: 98 % (01/20/22 1200) Vital Signs (24h Range):  Temp:  [98 °F (36.7 °C)] 98 °F (36.7 °C)  Pulse:  [61-83] 61  Resp:  [18-20] 20  SpO2:  [97 %-99 %] 98 %  BP: ()/(58-74) 91/61     Weight: 74.8 kg (165 lb)  Body mass index is 26.63 kg/m².    Physical Exam      General: Patient resting comfortably in no acute distress.  Eyes: No conjunctival pallor. No scleral icterus.  Lungs: CTA. Good air entry.  Cor: Regular rate and rhythm. No murmurs. No pedal edema.  Abd: Soft.  Epigastric tenderness noted.  No HSM  Musculoskeletal: No arthropathy, deformity.  Skin:  Multiple erythematous patches in various stages of healing over bilateral upper extremities.  Lymph nodes:  No axillary or inguinal lymphadenopathy  Neuro: A&O x3. Moving all extremities  equally  Ext: No clubbing. No cyanosis. Peripheral pulses +      Significant Labs:   All pertinent labs within the past 24 hours have been reviewed.  CBC:   Recent Labs   Lab 01/20/22 0713   WBC 7.85   HGB 14.2   HCT 44.2        CMP:   Recent Labs   Lab 01/20/22 0713      K 3.3*      CO2 19*   *   BUN 9   CREATININE 0.8   CALCIUM 9.1   PROT 7.0   ALBUMIN 4.1   BILITOT 0.3   ALKPHOS 61   AST 21   ALT 17   ANIONGAP 15   EGFRNONAA >60.0     Lipase:   Recent Labs   Lab 01/20/22 0713   LIPASE 144*       Significant Imaging: I have reviewed all pertinent imaging results/findings within the past 24 hours.     Imaging Results          CT Abdomen Pelvis With Contrast (Final result)  Result time 01/20/22 08:54:23    Final result by Papo Alvarado MD (01/20/22 08:54:23)                 Narrative:    CMS MANDATED QUALITY DATA - CT RADIATION - 436    All CT scans at this facility utilize dose modulation, iterative reconstruction, and/or weight based dosing when appropriate to reduce radiation dose to as low as reasonably achievable.        Reason: Pancreatitis, acute, severe    TECHNIQUE: CT abdomen and pelvis with 100 mL Omnipaque 350.    COMPARISON: 8/31/2021    CT ABDOMEN:  Partially visualized lung bases are clear.    Surgical clips in gallbladder fossa indicate cholecystectomy. Pancreas is normal, without abnormal parenchymal enhancement or peripancreatic fat stranding.    Liver, spleen, adrenals, and left kidney are normal. Multifocal cortical defects in right kidney suggests scarring. Aorta is normal.    No intestinal abnormality. Appendix not identified, but no secondary signs of appendicitis are evident. No free intraperitoneal gas or fluid. Unilateral right L5 pars defect incidentally noted. Schmorl's node affects superior endplate of L1.    CT PELVIS:  Bladder is normal. Uterus has been removed. No adnexal mass or free pelvic fluid.    IMPRESSION:    1. No acute findings throughout the  abdomen and pelvis. Specifically, the pancreas is normal on CT.  2. Chronic right renal cortical scarring, unchanged.    Electronically signed by:  Papo Alvarado MD  1/20/2022 8:54 AM CST Workstation: [id number]                                Assessment/Plan:     Active Hospital Problems    Diagnosis  POA    *Idiopathic acute pancreatitis [K85.00]  Yes    Hypokalemia [E87.6]  Yes    Depression [F32.A]  Yes    Mild intermittent asthma without complication [J45.20]  Yes      Resolved Hospital Problems   No resolved problems to display.       Plan:  Mild elevation of lipase noted however no CT evidence of pancreatic inflammation  Clinical history consistent with pancreatitis  Has history of cholecystectomy.  No alcohol use reported.  Check IgG 4 to rule out autoimmune pancreatitis  Obtain Gastroenterology consultation for further evaluation  In the interim treat with conservative measures including bowel rest  Intravenous pain medications and p.r.n. antiemetics  Maintenance IV fluids until p.o. intake improves  Monitor electrolytes and replete per sliding scale protocol  Continue home medications for chronic medical conditions    VTE risk low. SCDs, Ambulate ad rosalio     Marcin Mason MD  Department of Hospital Medicine   [facility] - Emergency Dept

## 2022-01-20 NOTE — HPI
Patient is a 46-year-old  female with ADHD, bipolar disorder type 1, neurodermatosis who presented to the ED with chief complaint of intractable abdominal pain.    She was seen in our ED for similar symptoms 2 days ago with no improvement prompting her to return to the ED.  She reports history of intermittent chronic abdominal pain.  However over the last week she has been experiencing severe sharp constant epigastric pain.  Occasional radiation to the right and to the back.  Associated with diarrhea, several episodes of nausea and vomiting.  Appetite is poor and is unable to keep anything down.  Symptoms are worse when she sits up.  No improvement with ibuprofen that she tried.  She also tried marijuana with no relief.  She denies fever or chills. She underwent EGD and colonoscopy about 3 months ago which she reports as being unremarkable.  Her PCP is working her up for lymphadenopathy and left breast discharge and she has an MRI of the abdomen and MRI of the breast scheduled on outpatient basis.  She has history of cholecystectomy and appendectomy.    Rest of the 10 point review of systems is negative except as mentioned above.

## 2022-01-20 NOTE — SUBJECTIVE & OBJECTIVE
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    Endometriosis     Headache     Heartburn     Hypotension     Infertility, female     Insomnia     Migraine headache     PTSD (post-traumatic stress disorder)     Trauma        Past Surgical History:   Procedure Laterality Date    APPENDECTOMY      BLADDER SUSPENSION      CERVICAL FUSION       SECTION      CHOLECYSTECTOMY      CYSTOSCOPY WITH BIOPSY OF BLADDER N/A 2018    Procedure: CYSTOSCOPY, WITH BLADDER BIOPSY, WITH FULGURATION IF INDICATED;  Surgeon: Luna Slater MD;  Location: [facility] OR;  Service: Urology;  Laterality: N/A;    CYSTOSCOPY WITH HYDRODISTENSION OF BLADDER N/A 12/3/2019    Procedure: CYSTOSCOPY, WITH BLADDER HYDRODISTENSION;  Surgeon: Gumaro Mcgovern MD;  Location: [facility] OR;  Service: OB/GYN;  Laterality: N/A;    DILATION OF URETHRA N/A 2018    Procedure: DILATION, URETHRA;  Surgeon: Luna Slater MD;  Location: [facility] OR;  Service: Urology;  Laterality: N/A;    HYSTERECTOMY      KNEE SURGERY Bilateral     TIBIA FRACTURE SURGERY Right     tibia/ fibula repaired with pins and screws    TOE SURGERY Left        Review of patient's allergies indicates:   Allergen Reactions    Quetiapine Other (See Comments)     Dystonic reaction  Other reaction(s): Unknown    Aspirin     Chlorpromazine      Other reaction(s): Unknown    Gabapentin      Other reaction(s): Unknown    Haloperidol lactate      Other reaction(s): Unknown    Seroquel [quetiapine]     Pcn [penicillins] Rash       Current Facility-Administered Medications on File Prior to Encounter   Medication    lactated ringers infusion    sodium chloride 0.9% flush 3 mL     Current Outpatient Medications  on File Prior to Encounter   Medication Sig    cariprazine (VRAYLAR) 3 mg Cap Take 1 capsule (3 mg total) by mouth once daily.    dextroamphetamine-amphetamine (ADDERALL) 15 mg tablet Take 1 tablet by mouth 3 times daily    diazePAM (VALIUM) 10 MG Tab Take 10 mg by mouth every 8 (eight) hours as needed.    DULoxetine (CYMBALTA) 20 MG capsule Take 1 capsule (20 mg total) by mouth once daily.    ergocalciferol (ERGOCALCIFEROL) 50,000 unit Cap Take one capsule by mouth once per week (Patient taking differently: Take 50,000 Units by mouth every 7 days.)    ondansetron (ZOFRAN) 4 MG tablet Take 1 tablet (4 mg total) by mouth every 4 (four) hours.    apixaban (ELIQUIS) 5 mg Tab Take 1 tablet by mouth 2 (two) times a day.    cariprazine (VRAYLAR) 3 mg Cap 1 capsule    cariprazine (VRAYLAR) 3 mg Cap Take 1 capsule by mouth once daily    clindamycin (CLEOCIN) 300 MG capsule Take 1 capsule by mouth every 12 hours for 5 days    diazePAM (VALIUM) 10 MG Tab Take 1 tablet (10 mg total) by mouth every 8 (eight) hours as needed.    diazePAM (VALIUM) 10 MG Tab Take 1 tablet by mouth every 8 hours as needed for anxiety    dicyclomine (BENTYL) 20 mg tablet Take 1 tablet by mouth four times daily.    DULoxetine (CYMBALTA) 20 MG capsule duloxetine 20 mg capsule,delayed release   TAKE 1 CAPSULE BY MOUTH ONCE DAILY    DULoxetine (CYMBALTA) 20 MG capsule 1 capsule Orally Once a day 90 day(s)    HYDROcodone-acetaminophen (NORCO) 5-325 mg per tablet Take 1 tablet by mouth every 6 (six) hours as needed for Pain.    hydrOXYzine pamoate (VISTARIL) 50 MG Cap Take 2 capsules by mouth 2 (two) times a day.    lamoTRIgine (LAMICTAL) 150 MG Tab 1 tablet    levoFLOXacin (LEVAQUIN) 500 MG tablet Take 1 tablet by mouth once daily for 10 days    lurasidone (LATUDA) 60 mg Tab tablet 1/2 tablet with food    methylPREDNISolone (MEDROL DOSEPACK) 4 mg tablet Take as directed on package for 6 days    mirtazapine (REMERON) 45 MG tablet  Take 1 tablet by mouth nightly.    montelukast (SINGULAIR) 10 mg tablet 1 tablet    ondansetron (ZOFRAN-ODT) 4 MG TbDL Take 1 tablet (4 mg total) by mouth every 6 (six) hours as needed.    promethazine (PHENERGAN) 25 MG suppository Place 1 suppository (25 mg total) rectally every 6 (six) hours as needed for Nausea.    promethazine (PHENERGAN) 6.25 mg/5 mL syrup Take 10 mLs by mouth every 12 hours as needed for 7 days    rizatriptan (MAXALT) 10 MG tablet Take 1 tablet by mouth once.    sulfamethoxazole-trimethoprim 800-160mg (BACTRIM DS) 800-160 mg Tab Take 1 tablet by mouth twice daily for 10 days    vilazodone (VIIBRYD) 10 mg (7)- 20 mg (23) DsPk as directed     Family History     Problem Relation (Age of Onset)    Breast cancer Mother, Maternal Grandmother    Hypertension Father, Mother    Stroke Mother        Tobacco Use    Smoking status: Current Every Day Smoker     Packs/day: 1.00     Types: Cigarettes, Vaping with nicotine    Smokeless tobacco: Never Used   Substance and Sexual Activity    Alcohol use: No    Drug use: Yes     Types: Marijuana     Comment: last dose last week.    Sexual activity: Yes     Partners: Male     Birth control/protection: See Surgical Hx       Objective:     Vital Signs (Most Recent):  Temp: 98 °F (36.7 °C) (01/20/22 0708)  Pulse: 61 (01/20/22 1200)  Resp: 20 (01/20/22 1137)  BP: 91/61 (01/20/22 1200)  SpO2: 98 % (01/20/22 1200) Vital Signs (24h Range):  Temp:  [98 °F (36.7 °C)] 98 °F (36.7 °C)  Pulse:  [61-83] 61  Resp:  [18-20] 20  SpO2:  [97 %-99 %] 98 %  BP: ()/(58-74) 91/61     Weight: 74.8 kg (165 lb)  Body mass index is 26.63 kg/m².    Physical Exam      General: Patient resting comfortably in no acute distress.  Eyes: No conjunctival pallor. No scleral icterus.  Lungs: CTA. Good air entry.  Cor: Regular rate and rhythm. No murmurs. No pedal edema.  Abd: Soft.  Epigastric tenderness noted.  No HSM  Musculoskeletal: No arthropathy, deformity.  Skin:  Multiple  erythematous patches in various stages of healing over bilateral upper extremities.  Lymph nodes:  No axillary or inguinal lymphadenopathy  Neuro: A&O x3. Moving all extremities equally  Ext: No clubbing. No cyanosis. Peripheral pulses +      Significant Labs:   All pertinent labs within the past 24 hours have been reviewed.  CBC:   Recent Labs   Lab 01/20/22  0713   WBC 7.85   HGB 14.2   HCT 44.2        CMP:   Recent Labs   Lab 01/20/22  0713      K 3.3*      CO2 19*   *   BUN 9   CREATININE 0.8   CALCIUM 9.1   PROT 7.0   ALBUMIN 4.1   BILITOT 0.3   ALKPHOS 61   AST 21   ALT 17   ANIONGAP 15   EGFRNONAA >60.0     Lipase:   Recent Labs   Lab 01/20/22  0713   LIPASE 144*       Significant Imaging: I have reviewed all pertinent imaging results/findings within the past 24 hours.     Imaging Results          CT Abdomen Pelvis With Contrast (Final result)  Result time 01/20/22 08:54:23    Final result by Papo Alvarado MD (01/20/22 08:54:23)                 Narrative:    CMS MANDATED QUALITY DATA - CT RADIATION - 436    All CT scans at this facility utilize dose modulation, iterative reconstruction, and/or weight based dosing when appropriate to reduce radiation dose to as low as reasonably achievable.        Reason: Pancreatitis, acute, severe    TECHNIQUE: CT abdomen and pelvis with 100 mL Omnipaque 350.    COMPARISON: 8/31/2021    CT ABDOMEN:  Partially visualized lung bases are clear.    Surgical clips in gallbladder fossa indicate cholecystectomy. Pancreas is normal, without abnormal parenchymal enhancement or peripancreatic fat stranding.    Liver, spleen, adrenals, and left kidney are normal. Multifocal cortical defects in right kidney suggests scarring. Aorta is normal.    No intestinal abnormality. Appendix not identified, but no secondary signs of appendicitis are evident. No free intraperitoneal gas or fluid. Unilateral right L5 pars defect incidentally noted. Schmorl's node affects  superior endplate of L1.    CT PELVIS:  Bladder is normal. Uterus has been removed. No adnexal mass or free pelvic fluid.    IMPRESSION:    1. No acute findings throughout the abdomen and pelvis. Specifically, the pancreas is normal on CT.  2. Chronic right renal cortical scarring, unchanged.    Electronically signed by:  Papo Alvarado MD  1/20/2022 8:54 AM CST Workstation: [id number]

## 2022-01-20 NOTE — ED NOTES
Pt here with a written list of symptoms she has been experiencing for the last 6 months. States has been seen twice for these symptoms in the past. VSS, however, pt tachypneic in stretcher.

## 2022-01-21 ENCOUNTER — ANESTHESIA EVENT (OUTPATIENT)
Dept: SURGERY | Facility: HOSPITAL | Age: 47
End: 2022-01-21
Payer: MEDICARE

## 2022-01-21 ENCOUNTER — ANESTHESIA (OUTPATIENT)
Dept: SURGERY | Facility: HOSPITAL | Age: 47
End: 2022-01-21
Payer: MEDICARE

## 2022-01-21 VITALS
OXYGEN SATURATION: 96 % | HEART RATE: 50 BPM | SYSTOLIC BLOOD PRESSURE: 94 MMHG | DIASTOLIC BLOOD PRESSURE: 60 MMHG | RESPIRATION RATE: 16 BRPM | BODY MASS INDEX: 26.52 KG/M2 | HEIGHT: 66 IN | TEMPERATURE: 97 F | WEIGHT: 165 LBS

## 2022-01-21 PROBLEM — E87.6 HYPOKALEMIA: Status: RESOLVED | Noted: 2022-01-20 | Resolved: 2022-01-21

## 2022-01-21 PROBLEM — K85.00 IDIOPATHIC ACUTE PANCREATITIS: Status: RESOLVED | Noted: 2022-01-20 | Resolved: 2022-01-21

## 2022-01-21 PROBLEM — F32.A DEPRESSION: Status: RESOLVED | Noted: 2021-02-18 | Resolved: 2022-01-21

## 2022-01-21 LAB
ALBUMIN SERPL BCP-MCNC: 3.3 G/DL (ref 3.5–5.2)
ALP SERPL-CCNC: 46 U/L (ref 55–135)
ALT SERPL W/O P-5'-P-CCNC: 14 U/L (ref 10–44)
ANION GAP SERPL CALC-SCNC: 8 MMOL/L (ref 8–16)
AST SERPL-CCNC: 17 U/L (ref 10–40)
BILIRUB SERPL-MCNC: 0.5 MG/DL (ref 0.1–1)
BUN SERPL-MCNC: 8 MG/DL (ref 6–20)
CALCIUM SERPL-MCNC: 8.2 MG/DL (ref 8.7–10.5)
CHLORIDE SERPL-SCNC: 106 MMOL/L (ref 95–110)
CO2 SERPL-SCNC: 26 MMOL/L (ref 23–29)
CREAT SERPL-MCNC: 0.7 MG/DL (ref 0.5–1.4)
ERYTHROCYTE [DISTWIDTH] IN BLOOD BY AUTOMATED COUNT: 12.8 % (ref 11.5–14.5)
EST. GFR  (AFRICAN AMERICAN): >60 ML/MIN/1.73 M^2
EST. GFR  (NON AFRICAN AMERICAN): >60 ML/MIN/1.73 M^2
GLUCOSE SERPL-MCNC: 91 MG/DL (ref 70–110)
HCT VFR BLD AUTO: 38.1 % (ref 37–48.5)
HGB BLD-MCNC: 12.5 G/DL (ref 12–16)
MAGNESIUM SERPL-MCNC: 1.8 MG/DL (ref 1.6–2.6)
MCH RBC QN AUTO: 28.5 PG (ref 27–31)
MCHC RBC AUTO-ENTMCNC: 32.8 G/DL (ref 32–36)
MCV RBC AUTO: 87 FL (ref 82–98)
PLATELET # BLD AUTO: 295 K/UL (ref 150–450)
PMV BLD AUTO: 8.9 FL (ref 9.2–12.9)
POTASSIUM SERPL-SCNC: 3.3 MMOL/L (ref 3.5–5.1)
PROT SERPL-MCNC: 5.8 G/DL (ref 6–8.4)
RBC # BLD AUTO: 4.39 M/UL (ref 4–5.4)
SODIUM SERPL-SCNC: 140 MMOL/L (ref 136–145)
WBC # BLD AUTO: 5.98 K/UL (ref 3.9–12.7)

## 2022-01-21 PROCEDURE — 27200043 HC FORCEPS, BIOPSY: Performed by: INTERNAL MEDICINE

## 2022-01-21 PROCEDURE — 96361 HYDRATE IV INFUSION ADD-ON: CPT | Mod: 59

## 2022-01-21 PROCEDURE — 83735 ASSAY OF MAGNESIUM: CPT | Performed by: INTERNAL MEDICINE

## 2022-01-21 PROCEDURE — 43239 EGD BIOPSY SINGLE/MULTIPLE: CPT | Performed by: INTERNAL MEDICINE

## 2022-01-21 PROCEDURE — 36415 COLL VENOUS BLD VENIPUNCTURE: CPT | Performed by: INTERNAL MEDICINE

## 2022-01-21 PROCEDURE — 27000284 HC CANNULA NASAL: Performed by: NURSE ANESTHETIST, CERTIFIED REGISTERED

## 2022-01-21 PROCEDURE — 63600175 PHARM REV CODE 636 W HCPCS: Performed by: INTERNAL MEDICINE

## 2022-01-21 PROCEDURE — 88305 TISSUE EXAM BY PATHOLOGIST: CPT | Mod: TC,59

## 2022-01-21 PROCEDURE — 00731 ANES UPR GI NDSC PX NOS: CPT | Performed by: INTERNAL MEDICINE

## 2022-01-21 PROCEDURE — 80053 COMPREHEN METABOLIC PANEL: CPT | Performed by: INTERNAL MEDICINE

## 2022-01-21 PROCEDURE — 96374 THER/PROPH/DIAG INJ IV PUSH: CPT | Mod: 59

## 2022-01-21 PROCEDURE — G0378 HOSPITAL OBSERVATION PER HR: HCPCS

## 2022-01-21 PROCEDURE — 37000008 HC ANESTHESIA 1ST 15 MINUTES: Performed by: INTERNAL MEDICINE

## 2022-01-21 PROCEDURE — 27202103: Performed by: NURSE ANESTHETIST, CERTIFIED REGISTERED

## 2022-01-21 PROCEDURE — 96376 TX/PRO/DX INJ SAME DRUG ADON: CPT | Mod: 59

## 2022-01-21 PROCEDURE — 25000003 PHARM REV CODE 250: Performed by: NURSE ANESTHETIST, CERTIFIED REGISTERED

## 2022-01-21 PROCEDURE — 63600175 PHARM REV CODE 636 W HCPCS: Performed by: NURSE ANESTHETIST, CERTIFIED REGISTERED

## 2022-01-21 PROCEDURE — 27000675 HC TUBING MICRODRIP: Performed by: NURSE ANESTHETIST, CERTIFIED REGISTERED

## 2022-01-21 PROCEDURE — 85027 COMPLETE CBC AUTOMATED: CPT | Performed by: INTERNAL MEDICINE

## 2022-01-21 PROCEDURE — 37000009 HC ANESTHESIA EA ADD 15 MINS: Performed by: INTERNAL MEDICINE

## 2022-01-21 PROCEDURE — 27000671 HC TUBING MICROBORE EXT: Performed by: NURSE ANESTHETIST, CERTIFIED REGISTERED

## 2022-01-21 RX ORDER — PANTOPRAZOLE SODIUM 40 MG/1
40 TABLET, DELAYED RELEASE ORAL DAILY
Status: DISCONTINUED | OUTPATIENT
Start: 2022-01-21 | End: 2022-01-21 | Stop reason: HOSPADM

## 2022-01-21 RX ORDER — ONDANSETRON 4 MG/1
4 TABLET, FILM COATED ORAL EVERY 8 HOURS PRN
Qty: 20 TABLET | Refills: 0 | Status: SHIPPED | OUTPATIENT
Start: 2022-01-21 | End: 2022-09-29

## 2022-01-21 RX ORDER — PROPOFOL 10 MG/ML
VIAL (ML) INTRAVENOUS
Status: DISCONTINUED | OUTPATIENT
Start: 2022-01-21 | End: 2022-01-21

## 2022-01-21 RX ORDER — SUCRALFATE 1 G/10ML
1 SUSPENSION ORAL EVERY 6 HOURS
Status: DISCONTINUED | OUTPATIENT
Start: 2022-01-21 | End: 2022-01-21

## 2022-01-21 RX ORDER — HYOSCYAMINE SULFATE 0.5 MG/ML
0.25 INJECTION, SOLUTION SUBCUTANEOUS ONCE
Status: DISCONTINUED | OUTPATIENT
Start: 2022-01-21 | End: 2022-01-21

## 2022-01-21 RX ORDER — DEXAMETHASONE SODIUM PHOSPHATE 4 MG/ML
6 INJECTION, SOLUTION INTRA-ARTICULAR; INTRALESIONAL; INTRAMUSCULAR; INTRAVENOUS; SOFT TISSUE ONCE
Status: COMPLETED | OUTPATIENT
Start: 2022-01-21 | End: 2022-01-21

## 2022-01-21 RX ORDER — PANTOPRAZOLE SODIUM 40 MG/1
40 TABLET, DELAYED RELEASE ORAL DAILY
Qty: 30 TABLET | Refills: 0 | Status: ON HOLD | OUTPATIENT
Start: 2022-01-22 | End: 2022-10-04

## 2022-01-21 RX ADMIN — PROPOFOL 50 MG: 10 INJECTION, EMULSION INTRAVENOUS at 11:01

## 2022-01-21 RX ADMIN — SODIUM CHLORIDE, SODIUM LACTATE, POTASSIUM CHLORIDE, AND CALCIUM CHLORIDE: .6; .31; .03; .02 INJECTION, SOLUTION INTRAVENOUS at 12:01

## 2022-01-21 RX ADMIN — MORPHINE SULFATE 2 MG: 2 INJECTION, SOLUTION INTRAMUSCULAR; INTRAVENOUS at 12:01

## 2022-01-21 RX ADMIN — DEXAMETHASONE SODIUM PHOSPHATE 6 MG: 4 INJECTION, SOLUTION INTRA-ARTICULAR; INTRALESIONAL; INTRAMUSCULAR; INTRAVENOUS; SOFT TISSUE at 10:01

## 2022-01-21 RX ADMIN — MORPHINE SULFATE 2 MG: 2 INJECTION, SOLUTION INTRAMUSCULAR; INTRAVENOUS at 09:01

## 2022-01-21 RX ADMIN — SODIUM CHLORIDE: 0.9 INJECTION, SOLUTION INTRAVENOUS at 11:01

## 2022-01-21 RX ADMIN — MORPHINE SULFATE 2 MG: 2 INJECTION, SOLUTION INTRAMUSCULAR; INTRAVENOUS at 05:01

## 2022-01-21 NOTE — [employer] PATIENT INSTRUCTIONS
Discharge Summary/Instructions after an Endoscopic Procedure  Patient Name: Rosalina Jacobo  Patient MRN: [id number]  Patient YOB: 1975  Friday, January 21, 2022  Satinder Rdz MD  RESTRICTIONS:  During your procedure today, you received medications for sedation.  These   medications may affect your judgment, balance and coordination.  Therefore,   for 24 hours, you have the following restrictions:   - DO NOT drive a car, operate machinery, make legal/financial decisions,   sign important papers or drink alcohol.    ACTIVITY:  Today: no heavy lifting, straining or running due to procedural   sedation/anesthesia.  The following day: return to full activity including work.  DIET:  Eat and drink normally unless instructed otherwise.     TREATMENT FOR COMMON SIDE EFFECTS:  - Mild abdominal pain, nausea, belching, bloating or excessive gas:  rest,   eat lightly and use a heating pad.  - Sore Throat: treat with throat lozenges and/or gargle with warm salt   water.  - Because air was used during the procedure, expelling large amounts of air   from your rectum or belching is normal.  - If a bowel prep was taken, you may not have a bowel movement for 1-3 days.    This is normal.  SYMPTOMS TO WATCH FOR AND REPORT TO YOUR PHYSICIAN:  1. Abdominal pain or bloating, other than gas cramps.  2. Chest pain.  3. Back pain.  4. Signs of infection such as: chills or fever occurring within 24 hours   after the procedure.  5. Rectal bleeding, which would show as bright red, maroon, or black stools.   (A tablespoon of blood from the rectum is not serious, especially if   hemorrhoids are present.)  6. Vomiting.  7. Weakness or dizziness.  GO DIRECTLY TO THE NEAREST EMERGENCY ROOM IF YOU HAVE ANY OF THE FOLLOWING:      Difficulty breathing              Chills and/or fever over 101 F   Persistent vomiting and/or vomiting blood   Severe abdominal pain   Severe chest pain   Black, tarry stools   Bleeding- more than one  tablespoon   Any other symptom or condition that you feel may need urgent attention  Your doctor recommends these additional instructions:  If any biopsies were taken, your doctors clinic will contact you in 1 to 2   weeks with any results.  - [facility]   - Reservations using reglan due to h/o dystonic reaction per chart to other   meds  - Recommend outpatient gastric emptying study with Dr Peacock, minimize   benzos and no indication for narcotics  - PPI daily.  Zofran prn  - Gastroparesis diet advised.   [email or web address]  For questions, problems or results please call your physician - Satinder Rdz MD at Work:  [phone].  [facility] [place], EMERGENCY ROOM PHONE NUMBER: [phone]  IF A COMPLICATION OR EMERGENCY SITUATION ARISES AND YOU ARE UNABLE TO REACH   YOUR PHYSICIAN - GO DIRECTLY TO THE EMERGENCY ROOM.  MD Satinder St MD  1/21/2022 12:07:43 PM  This report has been verified and signed electronically.  Dear patient,  As a result of recent federal legislation (The Federal Cures Act), you may   receive lab or pathology results from your procedure in your [facility]   account before your physician is able to contact you. Your physician or   their representative will relay the results to you with their   recommendations at their soonest availability.  Thank you,  [employer]ATION

## 2022-01-21 NOTE — PLAN OF CARE
Chart and discharge orders reviewed.  Patient discharged home with no further case management needs         01/21/22 1518   Final Note   Assessment Type Final Discharge Note   Anticipated Discharge Disposition Home

## 2022-01-21 NOTE — TRANSFER OF CARE
"Anesthesia Transfer of Care Note    Patient: Rosalina Jacobo    Procedure(s) Performed: Procedure(s) (LRB):  EGD (ESOPHAGOGASTRODUODENOSCOPY) (N/A)    Patient location: PACU    Anesthesia Type: general    Transport from OR: Transported from OR on 2-3 L/min O2 by NC with adequate spontaneous ventilation    Post pain: adequate analgesia    Post assessment: no apparent anesthetic complications    Post vital signs: stable    Level of consciousness: awake and alert    Nausea/Vomiting: no nausea/vomiting    Complications: none    Transfer of care protocol was followed      Last vitals:   Visit Vitals  BP 95/60 (BP Location: Right arm, Patient Position: Lying)   Pulse 65   Temp 36 °C (96.8 °F) (Axillary)   Resp 16   Ht 5' 6" (1.676 m)   Wt 74.8 kg (165 lb)   LMP  (LMP Unknown)   SpO2 100%   Breastfeeding No   BMI 26.63 kg/m²     "

## 2022-01-21 NOTE — CONSULTS
GASTROENTEROLOGY INPATIENT CONSULT NOTE  Patient Name: Rosalina Brand  Patient MRN: [id number]  Patient : 1975    Admit Date: 2022  Service date: 2022    Reason for Consult: nausea,vomiting    PCP: Alberto Fairchild MD    Chief Complaint   Patient presents with    Generalized Body Aches     Patient reports muscle pain, n/v/d x 6 months        HPI: Patient is a 46 y.o. female with PMHx  Chronic recurrent nausea,vomiting and abdominal pain with office visits dating back to  followed by Dr Peacock. Pt reports progression of symptoms over the past 6 months and has had EGD/Colonoscopy both unremarkable. Reports chronic intermittent moderate nausea vomiting with decreased appetite.      CT abd    IMPRESSION:     1. No acute findings throughout the abdomen and pelvis. Specifically, the pancreas is normal on CT.  2. Chronic right renal cortical scarring, unchanged.       Lab Results   Component Value Date    WBC 5.98 2022    HGB 12.5 2022    HCT 38.1 2022    MCV 87 2022     2022       Lab Results   Component Value Date    INR 1.0 2021    INR 0.9 2018     Lab Results   Component Value Date    ALT 14 2022    AST 17 2022    ALKPHOS 46 (L) 2022    BILITOT 0.5 2022     Lab Results   Component Value Date    LIPASE 144 (H) 2022         Results for ROSALINA BRAND (MRN [id number]) as of 2022 11:42   Ref. Range 8/15/2021 18:44 8/15/2021 21:05 2021 17:10 2021 05:00 2022 03:11   Benzodiazepines Latest Ref Range: Negative   Presumptive Positive (A) Presumptive Positive (A) Presumptive Positive (A) Presumptive Positive (A)   Methadone metabolites Latest Ref Range: Negative     Negative    Phencyclidine Latest Ref Range: Negative   Negative Negative Negative Negative   Toxicology Information Unknown  SEE COMMENT SEE COMMENT SEE COMMENT SEE COMMENT   Cocaine (Metab.) Latest Ref Range: Negative   Negative Negative  Presumptive Positive (A) Negative   Opiate Scrn, Ur Latest Ref Range: Negative   Negative Negative Negative Negative   Barbiturate Screen, Ur Latest Ref Range: Negative   Negative Negative Negative Negative   Amphetamine Screen, Ur Latest Ref Range: Negative   Presumptive Positive (A) Presumptive Positive (A) Presumptive Positive (A) Presumptive Positive (A)   Marijuana (THC) Metabolite Latest Ref Range: Negative   Negative Negative Negative Negative       9/21 EGD/Colon AFA- normal  2018 EGD AFA - retained food contents  2017 Colon AFA - normal    Patient is a 46-year-old  female with ADHD, bipolar disorder type 1, neurodermatosis who presented to the ED with chief complaint of intractable abdominal pain.         She was seen in our ED for similar symptoms 2 days ago with no improvement prompting her to return to the ED.  She reports history of intermittent chronic abdominal pain.  However over the last week she has been experiencing severe sharp constant epigastric pain.  Occasional radiation to the right and to the back.  Associated with diarrhea, several episodes of nausea and vomiting.  Appetite is poor and is unable to keep anything down.  Symptoms are worse when she sits up.  No improvement with ibuprofen that she tried.  She also tried marijuana with no relief.  She denies fever or chills. She underwent EGD and colonoscopy about 3 months ago which she reports as being unremarkable.  Her PCP is working her up for lymphadenopathy and left breast discharge and she has an MRI of the abdomen and MRI of the breast scheduled on outpatient basis.  She has history of cholecystectomy and appendectomy.     Rest of the 10 point review of systems is negative except as mentioned above.          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    Endometriosis     Headache     Heartburn     Hypotension     Infertility, female     Insomnia     Migraine headache     PTSD (post-traumatic stress disorder)     Trauma         Past Surgical History:  Past Surgical History:   Procedure Laterality Date    APPENDECTOMY      BLADDER SUSPENSION      CERVICAL FUSION       SECTION      CHOLECYSTECTOMY      CYSTOSCOPY WITH BIOPSY OF BLADDER N/A 2018    Procedure: CYSTOSCOPY, WITH BLADDER BIOPSY, WITH FULGURATION IF INDICATED;  Surgeon: Luna Slater MD;  Location: [facility] OR;  Service: Urology;  Laterality: N/A;    CYSTOSCOPY WITH HYDRODISTENSION OF BLADDER N/A 12/3/2019    Procedure: CYSTOSCOPY, WITH BLADDER HYDRODISTENSION;  Surgeon: Gumaro Mcgovern MD;  Location: [facility] OR;  Service: OB/GYN;  Laterality: N/A;    DILATION OF URETHRA N/A 2018    Procedure: DILATION, URETHRA;  Surgeon: Luna Slater MD;  Location: [facility] OR;  Service: Urology;  Laterality: N/A;    HYSTERECTOMY      KNEE SURGERY Bilateral     TIBIA FRACTURE SURGERY Right     tibia/ fibula repaired with pins and screws    TOE SURGERY Left         Home Medications:  Medications Prior to Admission   Medication Sig Dispense Refill Last Dose    cariprazine (VRAYLAR) 3 mg Cap Take 1 capsule (3 mg total) by mouth once daily. 30 capsule 5 2022 at 20:00    dextroamphetamine-amphetamine (ADDERALL) 15 mg tablet Take 1 tablet by mouth 3 times daily 90 tablet 0 2022 at 08:00    diazePAM (VALIUM) 10 MG Tab Take 10 mg by mouth every 8 (eight) hours as needed.   2022 at 08:00    DULoxetine (CYMBALTA) 20 MG capsule Take 1 capsule (20 mg total) by mouth once daily. 30 capsule 0 2022 at 20:00    ergocalciferol (ERGOCALCIFEROL) 50,000 unit Cap Take one capsule by mouth once per week (Patient taking differently: Take 50,000 Units by mouth every 7 days.) 26  capsule 0 Past Week at Unknown time    ondansetron (ZOFRAN) 4 MG tablet Take 1 tablet (4 mg total) by mouth every 4 (four) hours. 20 tablet 0 Past Week at Unknown time    rizatriptan (MAXALT) 10 MG tablet Take 1 tablet by mouth once.   More than a month at Unknown time       Inpatient Medications:   hyoscyamine  0.25 mg Intravenous Once    pantoprazole  40 mg Oral Daily    sucralfate  1 g Oral Q6H     acetaminophen, calcium chloride IVPB, calcium chloride IVPB, calcium chloride IVPB, diazePAM, magnesium oxide, magnesium sulfate IVPB, magnesium sulfate IVPB, magnesium sulfate IVPB, magnesium sulfate IVPB, melatonin, morphine, naloxone, ondansetron, potassium chloride in water, potassium chloride in water, potassium chloride in water, potassium chloride in water, potassium chloride, potassium chloride, potassium chloride, potassium chloride    Review of patient's allergies indicates:   Allergen Reactions    Quetiapine Other (See Comments)     Dystonic reaction  Other reaction(s): Unknown    Aspirin     Chlorpromazine      Other reaction(s): Unknown    Gabapentin      Other reaction(s): Unknown    Haloperidol lactate      Other reaction(s): Unknown    Seroquel [quetiapine]     Pcn [penicillins] Rash       Social History:   Social History     Occupational History    Not on file   Tobacco Use    Smoking status: Current Every Day Smoker     Packs/day: 1.00     Types: Cigarettes, Vaping with nicotine    Smokeless tobacco: Never Used   Substance and Sexual Activity    Alcohol use: No    Drug use: Yes     Types: Marijuana     Comment: last dose last week.    Sexual activity: Yes     Partners: Male     Birth control/protection: See Surgical Hx       Family History:   Family History   Problem Relation Age of Onset    Hypertension Father     Hypertension Mother     Stroke Mother     Breast cancer Mother     Breast cancer Maternal Grandmother        Review of Systems:  A 10 point review of systems was  "performed and was normal, except as mentioned in the HPI, including constitutional, HEENT, heme, lymph, cardiovascular, respiratory, gastrointestinal, genitourinary, neurologic, endocrine, psychiatric and musculoskeletal.      OBJECTIVE:    Physical Exam:  24 Hour Vital Sign Ranges: Temp:  [97.3 °F (36.3 °C)-98.8 °F (37.1 °C)] 98.8 °F (37.1 °C)  Pulse:  [55-81] 55  Resp:  [16-20] 16  SpO2:  [95 %-98 %] 97 %  BP: ()/(57-97) 116/72  Most recent vitals: /72 (BP Location: Right arm, Patient Position: Lying)   Pulse (!) 55   Temp 98.8 °F (37.1 °C) (Temporal)   Resp 16   Ht 5' 6" (1.676 m)   Wt 74.8 kg (165 lb)   LMP  (LMP Unknown)   SpO2 97% Comment: room air  Breastfeeding No   BMI 26.63 kg/m²    GEN: well-developed, well-nourished, awake and alert, non-toxic appearing adult  HEENT: PERRL, sclera anicteric, oral mucosa pink and moist without lesion  NECK: trachea midline; Good ROM  CV: regular rate and rhythm, no murmurs or gallops  RESP: clear to auscultation bilaterally, no wheezes, rhonci or rales  ABD: soft, non-tender, non-distended, normal bowel sounds  EXT: no swelling or edema, 2+ pulses distally  SKIN: no rashes or jaundice  PSYCH: normal affect    Labs:   Recent Labs     01/20/22  0713 01/21/22  0551   WBC 7.85 5.98   MCV 89 87    295     Recent Labs     01/20/22  0713 01/21/22  0551    140   K 3.3* 3.3*    106   CO2 19* 26   BUN 9 8   * 91     No results for input(s): ALB in the last 72 hours.    Invalid input(s): ALKP, SGOT, SGPT, TBIL, DBIL, TPRO  No results for input(s): PT, INR, PTT in the last 72 hours.       IMPRESSION / RECOMMENDATIONS:  Nausea,vomiting suspect related to gastroparesis  -supportive care  -avoid narcotics  -no evidence of pancreatitis  -minimize benzos  -recommend gastroparesis tiered diet  -consider reglan in future and recommend outpatient gastric emptying study    Thank you for this consult.    Satinder Rdz  1/21/2022  11:38 AM        "

## 2022-01-21 NOTE — PLAN OF CARE
Chart and discharge orders reviewed.  Patient discharged home with no further case management needs.     01/21/22 1501   Final Note   Assessment Type Final Discharge Note   Anticipated Discharge Disposition Home   Post-Acute Status   Discharge Delays None known at this time

## 2022-01-21 NOTE — PLAN OF CARE
Medicare Outpatient Observation Notice was signed, explained and given to patient/caregiver on 01/21/2022 at 9:10am     addressed any questions or concerns.    Medicare Outpatient Observation Notice will be scanned into patient's medical record

## 2022-01-21 NOTE — ANESTHESIA POSTPROCEDURE EVALUATION
Anesthesia Post Evaluation    Patient: Rosalina Jacobo    Procedure(s) Performed: Procedure(s) (LRB):  EGD (ESOPHAGOGASTRODUODENOSCOPY) (N/A)    Final Anesthesia Type: MAC      Patient location during evaluation: GI PACU  Patient participation: Yes- Able to Participate  Level of consciousness: awake and alert, oriented and awake  Post-procedure vital signs: reviewed and stable  Pain management: adequate  Airway patency: patent    PONV status at discharge: No PONV  Anesthetic complications: no      Cardiovascular status: blood pressure returned to baseline, hemodynamically stable and stable  Respiratory status: unassisted, spontaneous ventilation and room air  Hydration status: euvolemic  Follow-up not needed.  Comments: The patient states that she was comfortable for the procedure and is without recall of the procedure.          Vitals Value Taken Time   /64 01/21/22 1225   Temp 36.2 °C (97.2 °F) 01/21/22 1208   Pulse 58 01/21/22 1227   Resp 16 01/21/22 1208   SpO2 96 % 01/21/22 1227   Vitals shown include unvalidated device data.      No case tracking events are documented in the log.      Pain/Ian Score: Pain Rating Prior to Med Admin: 8 (1/21/2022  9:45 AM)  Pain Rating Post Med Admin: 2 (1/21/2022 12:52 AM)

## 2022-01-21 NOTE — ANESTHESIA PREPROCEDURE EVALUATION
2022  Rosalina Jacobo is a 46 y.o., female.    Anesthesia Evaluation    I have reviewed the Patient Summary Reports.    I have reviewed the Nursing Notes. I have reviewed the NPO Status.   I have reviewed the Medications.     Review of Systems  Anesthesia Hx:  No problems with previous Anesthesia  Neg history of prior surgery. Denies Family Hx of Anesthesia complications.   Denies Personal Hx of Anesthesia complications.   Social:  No Alcohol Use, Smoker    Pulmonary:   Asthma mild    Hepatic/GI:   GERD    Musculoskeletal:   Arthritis   Spine Disorders: lumbar Degenerative disease and Chronic Pain    Neurological:   CVA Headaches: Migranes.    Psych:   Psychiatric history: ADHD, Bipolar. anxiety          Patient Active Problem List   Diagnosis    DDD (degenerative disc disease), lumbar    Lumbar spondylosis    Mild intermittent asthma without complication    Dyspareunia, female    Urinary frequency    Pelvic pain    Overdose    Depression    Opiate overdose    Colitis    Hypokalemia    Idiopathic acute pancreatitis       Past Surgical History:   Procedure Laterality Date    APPENDECTOMY      BLADDER SUSPENSION      CERVICAL FUSION       SECTION      CHOLECYSTECTOMY      CYSTOSCOPY WITH BIOPSY OF BLADDER N/A 2018    Procedure: CYSTOSCOPY, WITH BLADDER BIOPSY, WITH FULGURATION IF INDICATED;  Surgeon: Luna Slater MD;  Location: [facility] OR;  Service: Urology;  Laterality: N/A;    CYSTOSCOPY WITH HYDRODISTENSION OF BLADDER N/A 12/3/2019    Procedure: CYSTOSCOPY, WITH BLADDER HYDRODISTENSION;  Surgeon: Gumaro Mcgovern MD;  Location: [facility] OR;  Service: OB/GYN;  Laterality: N/A;    DILATION OF URETHRA N/A 2018    Procedure: DILATION, URETHRA;  Surgeon: Luna Slater MD;  Location: [facility] OR;  Service: Urology;  Laterality: N/A;    HYSTERECTOMY       KNEE SURGERY Bilateral     TIBIA FRACTURE SURGERY Right     tibia/ fibula repaired with pins and screws    TOE SURGERY Left         Tobacco Use:  The patient  reports that she has been smoking cigarettes and vaping with nicotine. She has been smoking about 1.00 pack per day. She has never used smokeless tobacco.     Results for orders placed or performed during the hospital encounter of 08/15/21   EKG 12-lead    Collection Time: 08/15/21  8:37 PM    Narrative    Test Reason : R42,    Vent. Rate : 068 BPM     Atrial Rate : 068 BPM     P-R Int : 160 ms          QRS Dur : 082 ms      QT Int : 424 ms       P-R-T Axes : 018 062 027 degrees     QTc Int : 450 ms    Normal sinus rhythm  Normal ECG  When compared with ECG of 17-FEB-2021 05:10,  Nonspecific T wave abnormality has replaced inverted T waves in Inferior  leads  Confirmed by Alfred Coulter MD ([id number]) on 8/17/2021 9:00:13 AM    Referred By: [email or web address]   SELF           Confirmed By:Alfred Coulter MD        Imaging Results          CT Abdomen Pelvis With Contrast (Final result)  Result time 01/20/22 08:54:23    Final result by Papo Alvarado MD (01/20/22 08:54:23)                 Narrative:    CMS MANDATED QUALITY DATA - CT RADIATION - 436    All CT scans at this facility utilize dose modulation, iterative reconstruction, and/or weight based dosing when appropriate to reduce radiation dose to as low as reasonably achievable.        Reason: Pancreatitis, acute, severe    TECHNIQUE: CT abdomen and pelvis with 100 mL Omnipaque 350.    COMPARISON: 8/31/2021    CT ABDOMEN:  Partially visualized lung bases are clear.    Surgical clips in gallbladder fossa indicate cholecystectomy. Pancreas is normal, without abnormal parenchymal enhancement or peripancreatic fat stranding.    Liver, spleen, adrenals, and left kidney are normal. Multifocal cortical defects in right kidney suggests scarring. Aorta is normal.    No intestinal abnormality. Appendix not identified, but  no secondary signs of appendicitis are evident. No free intraperitoneal gas or fluid. Unilateral right L5 pars defect incidentally noted. Schmorl's node affects superior endplate of L1.    CT PELVIS:  Bladder is normal. Uterus has been removed. No adnexal mass or free pelvic fluid.    IMPRESSION:    1. No acute findings throughout the abdomen and pelvis. Specifically, the pancreas is normal on CT.  2. Chronic right renal cortical scarring, unchanged.    Electronically signed by:  Papo Alvarado MD  1/20/2022 8:54 AM CST Workstation: [id number]                               Lab Results   Component Value Date    WBC 5.98 01/21/2022    HGB 12.5 01/21/2022    HCT 38.1 01/21/2022    MCV 87 01/21/2022     01/21/2022     BMP  Lab Results   Component Value Date     01/21/2022    K 3.3 (L) 01/21/2022     01/21/2022    CO2 26 01/21/2022    BUN 8 01/21/2022    CREATININE 0.7 01/21/2022    CALCIUM 8.2 (L) 01/21/2022    ANIONGAP 8 01/21/2022    GLU 91 01/21/2022     (H) 01/20/2022     (H) 01/18/2022       No results found for this or any previous visit.          Physical Exam  General:  Well nourished    Airway/Jaw/Neck:  Airway Findings: Mouth Opening: Normal Tongue: Normal  General Airway Assessment: Adult  Mallampati: II  Improves to II with phonation.  TM Distance: Normal, at least 6 cm  Jaw/Neck Findings:  Neck ROM: Normal ROM      Dental:  Dental Findings: Poor dentition   Chest/Lungs:  Chest/Lungs Findings: Clear to auscultation, Normal Respiratory Rate     Heart/Vascular:  Heart Findings: Rate: Normal  Rhythm: Regular Rhythm  Sounds: Normal        Mental Status:  Mental Status Findings:  Cooperative, Alert and Oriented         Anesthesia Plan  Type of Anesthesia, risks & benefits discussed:  Anesthesia Type:  MAC, general    Patient's Preference:   Plan Factors:          Intra-op Monitoring Plan: standard ASA monitors  Intra-op Monitoring Plan Comments:   Post Op Pain Control Plan: per  primary service following discharge from PACU  Post Op Pain Control Plan Comments:     Induction:   IV  Beta Blocker:  Patient is not currently on a Beta-Blocker (No further documentation required).       Informed Consent: Patient understands risks and agrees with Anesthesia plan.  Questions answered. Anesthesia consent signed with patient.  ASA Score: 3     Day of Surgery Review of History & Physical:    H&P update referred to the provider.         Ready For Surgery From Anesthesia Perspective.

## 2022-01-22 NOTE — DISCHARGE SUMMARY
[facility] Medicine  Discharge Summary      Patient Name: Rosalina Jacobo  MRN: [id number]  Patient Class: OP- Observation  Admission Date: 1/20/2022  Hospital Length of Stay: 0 days  Discharge Date and Time:  01/21/2022 8:42 PM  Attending Physician: No att. providers found   Discharging Provider: Abundio Yanes MD  Primary Care Provider: Alberto Fairchild MD      HPI:   Patient is a 46-year-old  female with ADHD, bipolar disorder type 1, neurodermatosis who presented to the ED with chief complaint of intractable abdominal pain.    She was seen in our ED for similar symptoms 2 days ago with no improvement prompting her to return to the ED.  She reports history of intermittent chronic abdominal pain.  However over the last week she has been experiencing severe sharp constant epigastric pain.  Occasional radiation to the right and to the back.  Associated with diarrhea, several episodes of nausea and vomiting.  Appetite is poor and is unable to keep anything down.  Symptoms are worse when she sits up.  No improvement with ibuprofen that she tried.  She also tried marijuana with no relief.  She denies fever or chills. She underwent EGD and colonoscopy about 3 months ago which she reports as being unremarkable.  Her PCP is working her up for lymphadenopathy and left breast discharge and she has an MRI of the abdomen and MRI of the breast scheduled on outpatient basis.  She has history of cholecystectomy and appendectomy.    Rest of the 10 point review of systems is negative except as mentioned above.      Procedure(s) (LRB):  EGD (ESOPHAGOGASTRODUODENOSCOPY) (N/A)      Hospital Course:   Patient admitted with ongoing gastroparesis due to polypharmacy  On various opiates/benzos/psych meds at home  EGD confirmed gastroparesis and unable to prescribe Reglan because of possible dystonia reaction/interaction with several medicines she takes at home  Later pt was discharged to home and will follow  up with GI team again on outpatient basis for gastric emptying study        Goals of Care Treatment Preferences:  Code Status: Full Code      Consults:   Consults (From admission, onward)        Status Ordering Provider     Inpatient consult to Gastroenterology  Once        Provider:  Jamey Gonzalez III, MD    Completed SHAWN YOU          No new Assessment & Plan notes have been filed under this hospital service since the last note was generated.  Service: Hospital Medicine    Final Active Diagnoses:    Diagnosis Date Noted POA    Depression [F32.A] 02/18/2021 Yes    Mild intermittent asthma without complication [J45.20] 06/06/2018 Yes      Problems Resolved During this Admission:    Diagnosis Date Noted Date Resolved POA    PRINCIPAL PROBLEM:  Idiopathic acute pancreatitis [K85.00] 01/20/2022 01/21/2022 Yes    Hypokalemia [E87.6] 01/20/2022 01/21/2022 Yes       Discharged Condition: good    Disposition: Home or Self Care    Follow Up:   Follow-up Information     Satinder Rdz MD In 1 week.    Specialty: Gastroenterology  Contact information:  [id number] [place] [address] [phone]                       Patient Instructions:   No discharge procedures on file.    Significant Diagnostic Studies: Labs:   CMP   Recent Labs   Lab 01/20/22  0713 01/21/22  0551    140   K 3.3* 3.3*    106   CO2 19* 26   * 91   BUN 9 8   CREATININE 0.8 0.7   CALCIUM 9.1 8.2*   PROT 7.0 5.8*   ALBUMIN 4.1 3.3*   BILITOT 0.3 0.5   ALKPHOS 61 46*   AST 21 17   ALT 17 14   ANIONGAP 15 8   ESTGFRAFRICA >60.0 >60.0   EGFRNONAA >60.0 >60.0    and CBC   Recent Labs   Lab 01/20/22  0713 01/20/22  0713 01/21/22  0551   WBC 7.85  --  5.98   HGB 14.2  --  12.5   HCT 44.2   < > 38.1     --  295    < > = values in this interval not displayed.       Pending Diagnostic Studies:     Procedure Component Value Units Date/Time    Specimen to Pathology - Surgery [[id number]] Collected: 01/21/22 1244    Order  Status: Sent Lab Status: No result     Specimen: Tissue          Medications:  Reconciled Home Medications:      Medication List      START taking these medications    pantoprazole 40 MG tablet  Commonly known as: PROTONIX  Take 1 tablet (40 mg total) by mouth once daily. for 30 doses  Start taking on: January 22, 2022        CHANGE how you take these medications    DULoxetine 20 MG capsule  Commonly known as: CYMBALTA  Take 1 capsule (20 mg total) by mouth once daily.  What changed: Another medication with the same name was removed. Continue taking this medication, and follow the directions you see here.     ondansetron 4 MG tablet  Commonly known as: ZOFRAN  Take 1 tablet (4 mg total) by mouth every 8 (eight) hours as needed for Nausea.  What changed:   · when to take this  · reasons to take this     VITAMIN D2 50,000 unit Cap  Generic drug: ergocalciferol  Take one capsule by mouth once per week  What changed:   · how much to take  · how to take this  · when to take this     VRAYLAR 3 mg Cap  Generic drug: cariprazine  Take 1 capsule (3 mg total) by mouth once daily.  What changed: Another medication with the same name was removed. Continue taking this medication, and follow the directions you see here.        CONTINUE taking these medications    rizatriptan 10 MG tablet  Commonly known as: MAXALT  Take 1 tablet by mouth once.        STOP taking these medications    apixaban 5 mg Tab  Commonly known as: ELIQUIS     clindamycin 300 MG capsule  Commonly known as: CLEOCIN     dextroamphetamine-amphetamine 15 mg tablet  Commonly known as: ADDERALL     diazePAM 10 MG Tab  Commonly known as: VALIUM     dicyclomine 20 mg tablet  Commonly known as: BENTYL     HYDROcodone-acetaminophen 5-325 mg per tablet  Commonly known as: NORCO     hydrOXYzine pamoate 50 MG Cap  Commonly known as: VISTARIL     lamoTRIgine 150 MG Tab  Commonly known as: LAMICTAL     levoFLOXacin 500 MG tablet  Commonly known as: LEVAQUIN     lurasidone  60 mg Tab tablet  Commonly known as: LATUDA     methylPREDNISolone 4 mg tablet  Commonly known as: MEDROL DOSEPACK     mirtazapine 45 MG tablet  Commonly known as: REMERON     montelukast 10 mg tablet  Commonly known as: SINGULAIR     ondansetron 4 MG Tbdl  Commonly known as: ZOFRAN-ODT     promethazine 25 MG suppository  Commonly known as: PHENERGAN     promethazine 6.25 mg/5 mL syrup  Commonly known as: PHENERGAN     sulfamethoxazole-trimethoprim 800-160mg 800-160 mg Tab  Commonly known as: BACTRIM DS     VIIBRYD 10 mg (7)- 20 mg (23) Dspk  Generic drug: vilazodone            Indwelling Lines/Drains at time of discharge:   Lines/Drains/Airways     None               Physical Exam  Cardiovascular:      Rate and Rhythm: Normal rate.   Neurological:      Mental Status: She is alert and oriented to person, place, and time.       Time spent on the discharge of patient: 23  minutes         Abundio Yanes MD  Department of Hospital Medicine  [facility]

## 2022-01-22 NOTE — HOSPITAL COURSE
Patient admitted with ongoing gastroparesis due to polypharmacy  On various opiates/benzos/psych meds at home  EGD confirmed gastroparesis and unable to prescribe Reglan because of possible dystonia reaction/interaction with several medicines she takes at home  Later pt was discharged to home and will follow up with GI team again on outpatient basis for gastric emptying study

## 2022-01-25 LAB
IGG SERPL-MCNC: 797 MG/DL (ref 586–1602)
IGG1 SER-MCNC: 472 MG/DL (ref 248–810)
IGG2 SER-MCNC: 176 MG/DL (ref 130–555)
IGG3 SER-MCNC: 28 MG/DL (ref 15–102)
IGG4 SER-MCNC: 83 MG/DL (ref 2–96)

## 2022-01-27 ENCOUNTER — HOSPITAL ENCOUNTER (OUTPATIENT)
Dept: RADIOLOGY | Facility: HOSPITAL | Age: 47
Discharge: HOME | End: 2022-01-27
Attending: EMERGENCY MEDICINE
Payer: MEDICARE

## 2022-01-27 DIAGNOSIS — N39.0 URINARY TRACT INFECTION, SITE NOT SPECIFIED: ICD-10-CM

## 2022-01-27 DIAGNOSIS — N64.52 NIPPLE DISCHARGE: ICD-10-CM

## 2022-01-27 DIAGNOSIS — E86.0 DEHYDRATION: ICD-10-CM

## 2022-01-27 PROCEDURE — A9585 GADOBUTROL INJECTION: HCPCS | Mod: PO | Performed by: EMERGENCY MEDICINE

## 2022-01-27 PROCEDURE — 25500020 PHARM REV CODE 255: Mod: PO | Performed by: EMERGENCY MEDICINE

## 2022-01-27 PROCEDURE — 74183 MRI ABD W/O CNTR FLWD CNTR: CPT | Mod: TC,PO

## 2022-01-27 RX ORDER — GADOBUTROL 604.72 MG/ML
7.5 INJECTION INTRAVENOUS
Status: COMPLETED | OUTPATIENT
Start: 2022-01-27 | End: 2022-01-27

## 2022-01-27 RX ADMIN — GADOBUTROL 7.5 ML: 604.72 INJECTION INTRAVENOUS at 02:01

## 2022-02-25 DIAGNOSIS — R11.2 NAUSEA WITH VOMITING: Primary | ICD-10-CM

## 2022-05-31 ENCOUNTER — HOSPITAL ENCOUNTER (EMERGENCY)
Facility: HOSPITAL | Age: 47
Discharge: HOME | End: 2022-05-31
Attending: EMERGENCY MEDICINE
Payer: MEDICARE

## 2022-05-31 VITALS
WEIGHT: 150 LBS | HEART RATE: 88 BPM | SYSTOLIC BLOOD PRESSURE: 138 MMHG | DIASTOLIC BLOOD PRESSURE: 84 MMHG | TEMPERATURE: 99 F | RESPIRATION RATE: 16 BRPM | BODY MASS INDEX: 24.21 KG/M2 | OXYGEN SATURATION: 99 %

## 2022-05-31 DIAGNOSIS — F41.9 ANXIETY: Primary | ICD-10-CM

## 2022-05-31 LAB
AMPHET+METHAMPHET UR QL: NEGATIVE
BACTERIA #/AREA URNS HPF: NEGATIVE /HPF
BARBITURATES UR QL SCN>200 NG/ML: NEGATIVE
BENZODIAZ UR QL SCN>200 NG/ML: NEGATIVE
BILIRUB UR QL STRIP: NEGATIVE
BZE UR QL SCN: NEGATIVE
CANNABINOIDS UR QL SCN: NEGATIVE
CLARITY UR: CLEAR
COLOR UR: YELLOW
CREAT UR-MCNC: 115 MG/DL (ref 15–325)
GLUCOSE UR QL STRIP: ABNORMAL
HGB UR QL STRIP: NEGATIVE
HYALINE CASTS #/AREA URNS LPF: 7 /LPF
KETONES UR QL STRIP: NEGATIVE
LEUKOCYTE ESTERASE UR QL STRIP: ABNORMAL
MICROSCOPIC COMMENT: ABNORMAL
NITRITE UR QL STRIP: NEGATIVE
OPIATES UR QL SCN: NEGATIVE
PCP UR QL SCN>25 NG/ML: NEGATIVE
PH UR STRIP: 6 [PH] (ref 5–8)
PROT UR QL STRIP: NEGATIVE
RBC #/AREA URNS HPF: 1 /HPF (ref 0–4)
SP GR UR STRIP: 1.01 (ref 1–1.03)
SQUAMOUS #/AREA URNS HPF: 6 /HPF
TOXICOLOGY INFORMATION: NORMAL
URN SPEC COLLECT METH UR: ABNORMAL
UROBILINOGEN UR STRIP-ACNC: NEGATIVE EU/DL
WBC #/AREA URNS HPF: 9 /HPF (ref 0–5)

## 2022-05-31 PROCEDURE — 25000003 PHARM REV CODE 250: Performed by: EMERGENCY MEDICINE

## 2022-05-31 PROCEDURE — 99283 EMERGENCY DEPT VISIT LOW MDM: CPT

## 2022-05-31 PROCEDURE — 81001 URINALYSIS AUTO W/SCOPE: CPT | Performed by: EMERGENCY MEDICINE

## 2022-05-31 PROCEDURE — 80307 DRUG TEST PRSMV CHEM ANLYZR: CPT | Performed by: EMERGENCY MEDICINE

## 2022-05-31 RX ORDER — ALPRAZOLAM 0.5 MG/1
0.5 TABLET ORAL
Status: COMPLETED | OUTPATIENT
Start: 2022-05-31 | End: 2022-05-31

## 2022-05-31 RX ORDER — METHOCARBAMOL 500 MG/1
500 TABLET, FILM COATED ORAL
Status: COMPLETED | OUTPATIENT
Start: 2022-05-31 | End: 2022-05-31

## 2022-05-31 RX ORDER — ALPRAZOLAM 0.25 MG/1
0.5 TABLET ORAL 3 TIMES DAILY PRN
Qty: 12 TABLET | Refills: 0 | Status: ON HOLD | OUTPATIENT
Start: 2022-05-31 | End: 2022-10-04 | Stop reason: HOSPADM

## 2022-05-31 RX ADMIN — METHOCARBAMOL 500 MG: 500 TABLET ORAL at 04:05

## 2022-05-31 RX ADMIN — ALPRAZOLAM 0.5 MG: 0.5 TABLET ORAL at 01:05

## 2022-05-31 RX ADMIN — ALPRAZOLAM 0.5 MG: 0.5 TABLET ORAL at 04:05

## 2022-05-31 NOTE — ED NOTES
"PT STATES, "I CAN'T GET OUT OF BED. A LOT OF ANXIETY AND MEMORY LOSS." GCS 15. NO ACUTE DISTRESS NOTED. STABLE CONDITION.   "

## 2022-05-31 NOTE — ED PROVIDER NOTES
"Encounter Date: 2022       History     Chief Complaint   Patient presents with    Mental Health Problem     States "my meds aren't working" "I can't get out of bed to do anything" and "I can't sit still".      Chief complaint is anxiety.  The patient states that she does not have any urge to get out of bed.  She feels overwhelmed and anxious.  She did 1 and half months ago have her doctor.  Her daily Valium.  She was taking 10 mg 3 times a day.  She had been taking that for years.  She only complains of slight diarrhea and anxiety.  She has been eating and drinking.  She denies homicidal suicidal ideations no audio visual hallucinations.  He does have history of bipolar disease she does take vraylar and Haldol.  No complaints of fever chills earache sore throat runny nose chest pain productive cough        Review of patient's allergies indicates:   Allergen Reactions    Quetiapine Other (See Comments)     Dystonic reaction  Other reaction(s): Unknown    Aspirin     Chlorpromazine      Other reaction(s): Unknown    Gabapentin      Other reaction(s): Unknown    Haloperidol lactate      Other reaction(s): Unknown    Seroquel [quetiapine]     Pcn [penicillins] Rash     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    Endometriosis     Headache     Heartburn     Hypotension     Infertility, female     Insomnia     Migraine headache     PTSD (post-traumatic stress disorder)     Trauma      Past Surgical History:   Procedure Laterality Date    APPENDECTOMY      BLADDER SUSPENSION      CERVICAL FUSION       SECTION      CHOLECYSTECTOMY      CYSTOSCOPY WITH BIOPSY OF BLADDER N/A 2018    Procedure: CYSTOSCOPY, WITH BLADDER BIOPSY, WITH FULGURATION " IF INDICATED;  Surgeon: Luna Slater MD;  Location: [facility] OR;  Service: Urology;  Laterality: N/A;    CYSTOSCOPY WITH HYDRODISTENSION OF BLADDER N/A 12/3/2019    Procedure: CYSTOSCOPY, WITH BLADDER HYDRODISTENSION;  Surgeon: Gumaro Mcgovern MD;  Location: [facility] OR;  Service: OB/GYN;  Laterality: N/A;    DILATION OF URETHRA N/A 12/12/2018    Procedure: DILATION, URETHRA;  Surgeon: Luna Slater MD;  Location: [facility] OR;  Service: Urology;  Laterality: N/A;    ESOPHAGOGASTRODUODENOSCOPY N/A 1/21/2022    Procedure: EGD (ESOPHAGOGASTRODUODENOSCOPY);  Surgeon: Satinder Rdz MD;  Location[facility];  Service: Endoscopy;  Laterality: N/A;    HYSTERECTOMY      KNEE SURGERY Bilateral     TIBIA FRACTURE SURGERY Right     tibia/ fibula repaired with pins and screws    TOE SURGERY Left      Family History   Problem Relation Age of Onset    Hypertension Father     Hypertension Mother     Stroke Mother     Breast cancer Mother     Breast cancer Maternal Grandmother      Social History     Tobacco Use    Smoking status: Current Every Day Smoker     Packs/day: 1.00     Types: Cigarettes, Vaping with nicotine    Smokeless tobacco: Never Used   Substance Use Topics    Alcohol use: No    Drug use: Yes     Types: Marijuana     Comment: last dose last week.     Review of Systems   Constitutional: Negative for chills and fever.   HENT: Negative for ear pain, rhinorrhea and sore throat.    Eyes: Negative for pain and visual disturbance.   Respiratory: Negative for cough and shortness of breath.    Cardiovascular: Negative for chest pain and palpitations.   Gastrointestinal: Negative for abdominal pain, constipation, diarrhea, nausea and vomiting.   Genitourinary: Negative for dysuria, frequency, hematuria and urgency.   Musculoskeletal: Negative for back pain, joint swelling and myalgias.   Skin: Negative for rash.   Neurological: Negative for dizziness, seizures, weakness and headaches.    Psychiatric/Behavioral: Positive for agitation. Negative for dysphoric mood, hallucinations, self-injury and suicidal ideas. The patient is nervous/anxious.        Physical Exam     Initial Vitals [05/31/22 0031]   BP Pulse Resp Temp SpO2   (!) 137/95 97 20 98.6 °F (37 °C) 99 %      MAP       --         Physical Exam    Nursing note and vitals reviewed.  Constitutional: She appears well-developed and well-nourished.   HENT:   Head: Normocephalic and atraumatic.   Nose: Nose normal.   Eyes: Conjunctivae, EOM and lids are normal. Pupils are equal, round, and reactive to light.   Neck: Trachea normal. Neck supple. No thyroid mass and no thyromegaly present.   Normal range of motion.  Cardiovascular: Normal rate, regular rhythm and normal heart sounds.   Pulmonary/Chest: Effort normal and breath sounds normal.   Abdominal: Abdomen is soft. There is no abdominal tenderness.   Musculoskeletal:         General: Normal range of motion.      Cervical back: Normal range of motion and neck supple.     Neurological: She is alert and oriented to person, place, and time. She has normal strength and normal reflexes. No cranial nerve deficit or sensory deficit.   Anxious   Skin: Skin is warm and dry.   Psychiatric: She has a normal mood and affect. Her speech is normal and behavior is normal. Judgment and thought content normal.         ED Course   Procedures  Labs Reviewed   URINALYSIS, REFLEX TO URINE CULTURE - Abnormal; Notable for the following components:       Result Value    Glucose, UA Trace (*)     Leukocytes, UA Trace (*)     All other components within normal limits    Narrative:     Specimen Source->Urine   URINALYSIS MICROSCOPIC - Abnormal; Notable for the following components:    WBC, UA 9 (*)     Hyaline Casts, UA 7 (*)     All other components within normal limits    Narrative:     Specimen Source->Urine   DRUG SCREEN PANEL, URINE EMERGENCY    Narrative:     Specimen Source->Urine          Imaging Results     None          Medications   ALPRAZolam tablet 0.5 mg (0.5 mg Oral Given 5/31/22 0114)   ALPRAZolam tablet 0.5 mg (0.5 mg Oral Given 5/31/22 0422)   methocarbamoL tablet 500 mg (500 mg Oral Given 5/31/22 0422)     Medical Decision Making:   ED Management:  The patient has anxiety will be given 3 days with medicines to she sees her doctor.                      Clinical Impression:   Final diagnoses:  [F41.9] Anxiety (Primary)          ED Disposition Condition    Discharge Stable        ED Prescriptions     Medication Sig Dispense Start Date End Date Auth. Provider    ALPRAZolam (XANAX) 0.25 MG tablet Take 2 tablets (0.5 mg total) by mouth 3 (three) times daily as needed for Anxiety. 12 tablet 5/31/2022 6/4/2022 Sb Bentley MD        Follow-up Information     Follow up With Specialties Details Why Contact Info    Alberto Fairchild MD Internal Medicine Schedule an appointment as soon as possible for a visit in 2 days  [address] [place] [address]  [address] [place] [id number]  [phone]             Sb Bentley MD  05/31/22 9180

## 2022-09-09 ENCOUNTER — HOSPITAL ENCOUNTER (EMERGENCY)
Facility: HOSPITAL | Age: 47
Discharge: HOME | End: 2022-09-09
Attending: EMERGENCY MEDICINE
Payer: MEDICARE

## 2022-09-09 VITALS
HEIGHT: 66 IN | SYSTOLIC BLOOD PRESSURE: 91 MMHG | RESPIRATION RATE: 18 BRPM | TEMPERATURE: 98 F | OXYGEN SATURATION: 95 % | WEIGHT: 165 LBS | HEART RATE: 60 BPM | BODY MASS INDEX: 26.52 KG/M2 | DIASTOLIC BLOOD PRESSURE: 55 MMHG

## 2022-09-09 DIAGNOSIS — R52 PAIN: ICD-10-CM

## 2022-09-09 DIAGNOSIS — R10.13 EPIGASTRIC PAIN: Primary | ICD-10-CM

## 2022-09-09 LAB
ALBUMIN SERPL BCP-MCNC: 3.6 G/DL (ref 3.5–5.2)
ALP SERPL-CCNC: 73 U/L (ref 55–135)
ALT SERPL W/O P-5'-P-CCNC: 33 U/L (ref 10–44)
ANION GAP SERPL CALC-SCNC: 9 MMOL/L (ref 8–16)
AST SERPL-CCNC: 27 U/L (ref 10–40)
BASOPHILS # BLD AUTO: 0.05 K/UL (ref 0–0.2)
BASOPHILS NFR BLD: 0.6 % (ref 0–1.9)
BILIRUB SERPL-MCNC: 0.6 MG/DL (ref 0.1–1)
BILIRUB UR QL STRIP: NEGATIVE
BUN SERPL-MCNC: 12 MG/DL (ref 6–20)
CALCIUM SERPL-MCNC: 9 MG/DL (ref 8.7–10.5)
CHLORIDE SERPL-SCNC: 109 MMOL/L (ref 95–110)
CLARITY UR: CLEAR
CO2 SERPL-SCNC: 25 MMOL/L (ref 23–29)
COLOR UR: YELLOW
CREAT SERPL-MCNC: 0.9 MG/DL (ref 0.5–1.4)
DIFFERENTIAL METHOD: ABNORMAL
EOSINOPHIL # BLD AUTO: 0.4 K/UL (ref 0–0.5)
EOSINOPHIL NFR BLD: 4.9 % (ref 0–8)
ERYTHROCYTE [DISTWIDTH] IN BLOOD BY AUTOMATED COUNT: 12.5 % (ref 11.5–14.5)
EST. GFR  (NO RACE VARIABLE): >60 ML/MIN/1.73 M^2
GLUCOSE SERPL-MCNC: 104 MG/DL (ref 70–110)
GLUCOSE UR QL STRIP: NEGATIVE
HCT VFR BLD AUTO: 41.8 % (ref 37–48.5)
HGB BLD-MCNC: 14.4 G/DL (ref 12–16)
HGB UR QL STRIP: NEGATIVE
IMM GRANULOCYTES # BLD AUTO: 0.03 K/UL (ref 0–0.04)
IMM GRANULOCYTES NFR BLD AUTO: 0.4 % (ref 0–0.5)
INFLUENZA A, MOLECULAR: NEGATIVE
INFLUENZA B, MOLECULAR: NEGATIVE
KETONES UR QL STRIP: NEGATIVE
LEUKOCYTE ESTERASE UR QL STRIP: NEGATIVE
LIPASE SERPL-CCNC: 30 U/L (ref 4–60)
LYMPHOCYTES # BLD AUTO: 2.1 K/UL (ref 1–4.8)
LYMPHOCYTES NFR BLD: 25.1 % (ref 18–48)
MCH RBC QN AUTO: 30 PG (ref 27–31)
MCHC RBC AUTO-ENTMCNC: 34.4 G/DL (ref 32–36)
MCV RBC AUTO: 87 FL (ref 82–98)
MONOCYTES # BLD AUTO: 0.5 K/UL (ref 0.3–1)
MONOCYTES NFR BLD: 5.8 % (ref 4–15)
NEUTROPHILS # BLD AUTO: 5.2 K/UL (ref 1.8–7.7)
NEUTROPHILS NFR BLD: 63.2 % (ref 38–73)
NITRITE UR QL STRIP: NEGATIVE
NRBC BLD-RTO: 0 /100 WBC
PH UR STRIP: 7 [PH] (ref 5–8)
PLATELET # BLD AUTO: 324 K/UL (ref 150–450)
PMV BLD AUTO: 9.1 FL (ref 9.2–12.9)
POTASSIUM SERPL-SCNC: 3.6 MMOL/L (ref 3.5–5.1)
PROT SERPL-MCNC: 6.5 G/DL (ref 6–8.4)
PROT UR QL STRIP: NEGATIVE
RBC # BLD AUTO: 4.8 M/UL (ref 4–5.4)
SARS-COV-2 RDRP RESP QL NAA+PROBE: NEGATIVE
SODIUM SERPL-SCNC: 143 MMOL/L (ref 136–145)
SP GR UR STRIP: 1.01 (ref 1–1.03)
SPECIMEN SOURCE: NORMAL
TROPONIN I SERPL DL<=0.01 NG/ML-MCNC: <0.03 NG/ML
URN SPEC COLLECT METH UR: NORMAL
UROBILINOGEN UR STRIP-ACNC: NEGATIVE EU/DL
WBC # BLD AUTO: 8.29 K/UL (ref 3.9–12.7)

## 2022-09-09 PROCEDURE — 93010 EKG 12-LEAD: ICD-10-PCS | Mod: ,,, | Performed by: INTERNAL MEDICINE

## 2022-09-09 PROCEDURE — U0002 COVID-19 LAB TEST NON-CDC: HCPCS | Performed by: EMERGENCY MEDICINE

## 2022-09-09 PROCEDURE — 96375 TX/PRO/DX INJ NEW DRUG ADDON: CPT

## 2022-09-09 PROCEDURE — 63600175 PHARM REV CODE 636 W HCPCS: Performed by: PHYSICIAN ASSISTANT

## 2022-09-09 PROCEDURE — 83690 ASSAY OF LIPASE: CPT | Performed by: PHYSICIAN ASSISTANT

## 2022-09-09 PROCEDURE — 93005 ELECTROCARDIOGRAM TRACING: CPT | Performed by: INTERNAL MEDICINE

## 2022-09-09 PROCEDURE — 85025 COMPLETE CBC W/AUTO DIFF WBC: CPT | Performed by: PHYSICIAN ASSISTANT

## 2022-09-09 PROCEDURE — 25000003 PHARM REV CODE 250: Performed by: PHYSICIAN ASSISTANT

## 2022-09-09 PROCEDURE — 63600175 PHARM REV CODE 636 W HCPCS: Performed by: EMERGENCY MEDICINE

## 2022-09-09 PROCEDURE — 25500020 PHARM REV CODE 255: Performed by: EMERGENCY MEDICINE

## 2022-09-09 PROCEDURE — 80053 COMPREHEN METABOLIC PANEL: CPT | Performed by: PHYSICIAN ASSISTANT

## 2022-09-09 PROCEDURE — 93010 ELECTROCARDIOGRAM REPORT: CPT | Mod: ,,, | Performed by: INTERNAL MEDICINE

## 2022-09-09 PROCEDURE — 81003 URINALYSIS AUTO W/O SCOPE: CPT | Performed by: PHYSICIAN ASSISTANT

## 2022-09-09 PROCEDURE — 96361 HYDRATE IV INFUSION ADD-ON: CPT

## 2022-09-09 PROCEDURE — 96374 THER/PROPH/DIAG INJ IV PUSH: CPT | Mod: 59

## 2022-09-09 PROCEDURE — 96376 TX/PRO/DX INJ SAME DRUG ADON: CPT

## 2022-09-09 PROCEDURE — 87502 INFLUENZA DNA AMP PROBE: CPT | Performed by: EMERGENCY MEDICINE

## 2022-09-09 PROCEDURE — 84484 ASSAY OF TROPONIN QUANT: CPT | Performed by: EMERGENCY MEDICINE

## 2022-09-09 PROCEDURE — 99285 EMERGENCY DEPT VISIT HI MDM: CPT | Mod: 25

## 2022-09-09 RX ORDER — DICYCLOMINE HYDROCHLORIDE 20 MG/1
20 TABLET ORAL 2 TIMES DAILY
Qty: 20 TABLET | Refills: 0 | Status: SHIPPED | OUTPATIENT
Start: 2022-09-09 | End: 2022-10-02

## 2022-09-09 RX ORDER — ONDANSETRON 4 MG/1
4 TABLET, ORALLY DISINTEGRATING ORAL EVERY 6 HOURS PRN
Qty: 12 TABLET | Refills: 0 | Status: SHIPPED | OUTPATIENT
Start: 2022-09-09 | End: 2022-10-02

## 2022-09-09 RX ORDER — ONDANSETRON 2 MG/ML
4 INJECTION INTRAMUSCULAR; INTRAVENOUS
Status: COMPLETED | OUTPATIENT
Start: 2022-09-09 | End: 2022-09-09

## 2022-09-09 RX ORDER — MORPHINE SULFATE 2 MG/ML
2 INJECTION, SOLUTION INTRAMUSCULAR; INTRAVENOUS
Status: COMPLETED | OUTPATIENT
Start: 2022-09-09 | End: 2022-09-09

## 2022-09-09 RX ORDER — DICYCLOMINE HYDROCHLORIDE 10 MG/1
20 CAPSULE ORAL
Status: COMPLETED | OUTPATIENT
Start: 2022-09-09 | End: 2022-09-09

## 2022-09-09 RX ADMIN — SODIUM CHLORIDE 1000 ML: 0.9 INJECTION, SOLUTION INTRAVENOUS at 01:09

## 2022-09-09 RX ADMIN — IOHEXOL 100 ML: 350 INJECTION, SOLUTION INTRAVENOUS at 03:09

## 2022-09-09 RX ADMIN — ONDANSETRON 4 MG: 2 INJECTION, SOLUTION INTRAMUSCULAR; INTRAVENOUS at 04:09

## 2022-09-09 RX ADMIN — MORPHINE SULFATE 2 MG: 2 INJECTION, SOLUTION INTRAMUSCULAR; INTRAVENOUS at 04:09

## 2022-09-09 RX ADMIN — ONDANSETRON 4 MG: 2 INJECTION, SOLUTION INTRAMUSCULAR; INTRAVENOUS at 01:09

## 2022-09-09 RX ADMIN — DICYCLOMINE HYDROCHLORIDE 20 MG: 10 CAPSULE ORAL at 01:09

## 2022-09-09 NOTE — FIRST PROVIDER EVALUATION
" Emergency Department TeleTriage Encounter Note      CHIEF COMPLAINT    Chief Complaint   Patient presents with    Abdominal Pain     Pt has been feeling bad all week, n/v/d today her lower abd hurts the worst.        VITAL SIGNS   Initial Vitals [09/09/22 1242]   BP Pulse Resp Temp SpO2   116/82 69 (!) 22 97.9 °F (36.6 °C) 99 %      MAP       --            ALLERGIES    Review of patient's allergies indicates:   Allergen Reactions    Quetiapine Other (See Comments)     Dystonic reaction  Other reaction(s): Unknown    Aspirin     Chlorpromazine      Other reaction(s): Unknown    Gabapentin      Other reaction(s): Unknown    Haloperidol lactate      Other reaction(s): Unknown    Seroquel [quetiapine]     Pcn [penicillins] Rash       PROVIDER TRIAGE NOTE  Patient presents with abdominal pain, nausea, vomiting, diarrhea and "feeling hot". No recent travel, antibiotics or exposure to illness. No melena or hematochezia.       ORDERS  Labs Reviewed - No data to display    ED Orders (720h ago, onward)      None              Virtual Visit Note: The provider triage portion of this emergency department evaluation and documentation was performed via [email or web address], a HIPAA-compliant telemedicine application, in concert with a tele-presenter in the room. A face to face patient evaluation with one of my colleagues will occur once the patient is placed in an emergency department room.      DISCLAIMER: This note was prepared with [employer] voice recognition transcription software. Garbled syntax, mangled pronouns, and other bizarre constructions may be attributed to that software system.    "

## 2022-09-09 NOTE — ED PROVIDER NOTES
Encounter Date: 2022       History     Chief Complaint   Patient presents with    Abdominal Pain     Pt has been feeling bad all week, n/v/d today her lower abd hurts the worst.      47-year-old female presents complaining of abdominal pain patient describes abdominal pain as sharp and localized in the epigastric and right upper quadrant region patient reports associated nausea and vomiting as well as diarrhea patient reports symptoms for the past 3-4 days patient has a past medical history of bipolar disorder, endometriosis, patient has had her gallbladder removed and has had several abdominal surgeries.    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    Endometriosis     Headache     Heartburn     Hypotension     Infertility, female     Insomnia     Migraine headache     PTSD (post-traumatic stress disorder)     Trauma      Past Surgical History:   Procedure Laterality Date    APPENDECTOMY      BLADDER SUSPENSION      CERVICAL FUSION       SECTION      CHOLECYSTECTOMY      CYSTOSCOPY WITH BIOPSY OF BLADDER N/A 2018    Procedure: CYSTOSCOPY, WITH BLADDER BIOPSY, WITH FULGURATION IF INDICATED;  Surgeon: Lnua Slater MD;  [facility];  Service: Urology;  Laterality: N/A;    CYSTOSCOPY WITH HYDRODISTENSION OF BLADDER N/A 12/3/2019    Procedure: CYSTOSCOPY, WITH BLADDER HYDRODISTENSION;   Surgeon: Gumaro Mcgovern MD;  Location: [facility] OR;  Service: OB/GYN;  Laterality: N/A;    DILATION OF URETHRA N/A 12/12/2018    Procedure: DILATION, URETHRA;  Surgeon: Luna Slater MD;  Location: [facility] OR;  Service: Urology;  Laterality: N/A;    ESOPHAGOGASTRODUODENOSCOPY N/A 1/21/2022    Procedure: EGD (ESOPHAGOGASTRODUODENOSCOPY);  Surgeon: Satinder Rdz MD;  Location: [facility] ENDO;  Service: Endoscopy;  Laterality: N/A;    HYSTERECTOMY      KNEE SURGERY Bilateral     TIBIA FRACTURE SURGERY Right     tibia/ fibula repaired with pins and screws    TOE SURGERY Left      Family History   Problem Relation Age of Onset    Hypertension Father     Hypertension Mother     Stroke Mother     Breast cancer Mother     Breast cancer Maternal Grandmother      Social History     Tobacco Use    Smoking status: Every Day     Packs/day: 1.00     Types: Cigarettes, Vaping with nicotine    Smokeless tobacco: Never   Substance Use Topics    Alcohol use: No    Drug use: Yes     Types: Marijuana     Comment: last dose last week.     Review of Systems   Constitutional:  Negative for fever.   HENT:  Negative for congestion, rhinorrhea, sore throat and trouble swallowing.    Eyes:  Negative for visual disturbance.   Respiratory:  Negative for cough, chest tightness, shortness of breath and wheezing.    Cardiovascular:  Negative for chest pain, palpitations and leg swelling.   Gastrointestinal:  Positive for abdominal pain, nausea and vomiting. Negative for abdominal distention, constipation and diarrhea.   Genitourinary:  Negative for difficulty urinating, dysuria, flank pain and frequency.   Musculoskeletal:  Negative for arthralgias, back pain, joint swelling and neck pain.   Skin:  Negative for color change and rash.   Neurological:  Negative for dizziness, syncope, speech difficulty, weakness, numbness and headaches.   All other systems reviewed and are negative.    Physical Exam     Initial Vitals [09/09/22 1242]   BP Pulse  Resp Temp SpO2   116/82 69 (!) 22 97.9 °F (36.6 °C) 99 %      MAP       --         Physical Exam    Nursing note and vitals reviewed.  Constitutional: She appears well-developed and well-nourished. She is not diaphoretic. No distress.   HENT:   Head: Normocephalic and atraumatic.   Right Ear: External ear normal.   Left Ear: External ear normal.   Nose: Nose normal.   Mouth/Throat: Oropharynx is clear and moist. No oropharyngeal exudate.   Eyes: Conjunctivae and EOM are normal. Pupils are equal, round, and reactive to light. Right eye exhibits no discharge. Left eye exhibits no discharge. No scleral icterus.   Neck: Neck supple. No thyromegaly present. No tracheal deviation present. No JVD present.   Normal range of motion.  Cardiovascular:  Normal rate, regular rhythm, normal heart sounds and intact distal pulses.     Exam reveals no gallop and no friction rub.       No murmur heard.  Pulmonary/Chest: Breath sounds normal. No stridor. No respiratory distress. She has no wheezes. She has no rhonchi. She has no rales. She exhibits no tenderness.   Abdominal: Abdomen is soft. Bowel sounds are normal. She exhibits no distension and no mass. There is abdominal tenderness.   Tenderness to palpation in the epigastric region There is no rebound and no guarding.   Musculoskeletal:         General: No tenderness or edema. Normal range of motion.      Cervical back: Normal range of motion and neck supple.     Lymphadenopathy:     She has no cervical adenopathy.   Neurological: She is alert and oriented to person, place, and time. She has normal strength. No cranial nerve deficit or sensory deficit.   Skin: Skin is warm and dry. No rash and no abscess noted. No erythema. No pallor.       ED Course   Procedures  Labs Reviewed   CBC W/ AUTO DIFFERENTIAL - Abnormal; Notable for the following components:       Result Value    MPV 9.1 (*)     All other components within normal limits    Narrative:     Recoll. [id number] by ANYI at  09/09/2022 13:57, reason: not labeled   COMPREHENSIVE METABOLIC PANEL    Narrative:     Recoll. [id number] by [email or web address] at 09/09/2022 13:57, reason: not labeled   LIPASE    Narrative:     Recoll. [id number] by [email or web address] at 09/09/2022 13:57, reason: not labeled   URINALYSIS, REFLEX TO URINE CULTURE    Narrative:     Specimen Source->Urine   SARS-COV-2 RNA AMPLIFICATION, QUAL   TROPONIN I    Narrative:     Recoll. [id number] by [email or web address] at 09/09/2022 13:57, reason: not labeled   INFLUENZA A AND B ANTIGEN    Narrative:     Specimen Source->Nasopharyngeal Swab        ECG Results              EKG 12-lead (In process)  Result time 09/09/22 15:44:46      In process by Interface, Lab In [facility] (09/09/22 15:44:46)                   Narrative:    Test Reason : R52,    Vent. Rate : 050 BPM     Atrial Rate : 050 BPM     P-R Int : 172 ms          QRS Dur : 072 ms      QT Int : 420 ms       P-R-T Axes : 000 086 019 degrees     QTc Int : 382 ms    Sinus bradycardia  Otherwise normal ECG  When compared with ECG of 15-AUG-2021 20:37,  QT has shortened    Referred By: [email or web address]   SELF           Confirmed By:                                   Imaging Results              CT Abdomen Pelvis With Contrast (Final result)  Result time 09/09/22 15:57:37      Final result by Chetan Reeder MD (09/09/22 15:57:37)                   Narrative:    CT ABDOMEN AND PELVIS WITH CONTRAST    HISTORY: Epigastric pain    CMS MANDATED QUALITY DATA - CT RADIATION  436    All CT scans at this facility utilize dose modulation, iterative reconstruction, and/or weight based dosing when appropriate to reduce radiation dose to as low as reasonably achievable    FINDINGS:    Post infusion images were obtained from the lung bases to the pubic symphysis. 100 cc nonionic contrast was administered for the examination.    Comparison is made to January 2022.    The lung bases are unremarkable.    The liver has a normal appearance. The gallbladder is absent. The biliary tree  is nondilated. The spleen, pancreas, and adrenal glands are normal. The left kidney has a normal appearance. Multifocal right renal cortical scarring is noted, chronic and unchanged, with small cortical calcification at the midpole also stable. The abdominal aorta is normal in caliber.    There is no pathologic bowel wall thickening or evidence of obstruction. The appendix, by report, is surgically absent. There is no free fluid.    Images of the pelvis demonstrate slight wall thickening of the urinary bladder, likely related to incomplete distention. There is no free fluid. Bowel structures are unremarkable.    No acute osseous abnormalities are identified. Unilateral right-sided spondylolysis is noted at L5.    IMPRESSION:      1. No acute CT abnormalities.  2. Chronic, stable findings as noted above.    Electronically signed by:  Chetan Reeder MD  9/9/2022 3:57 PM CDT Workstation: [id number]                                     X-Ray Chest AP Portable (Final result)  Result time 09/09/22 14:20:01      Final result by Yannick Negron Jr., MD (09/09/22 14:20:01)                   Narrative:    Examination: 1V chest    CLINICAL HISTORY: Abdominal pain.    COMPARISON: 8/15/2021.    TECHNIQUE: Single view of the chest.    FINDINGS: Cardiomediastinal silhouette is normal in size. Mediastinal and hilar contours are well-maintained. Lungs are well expanded and clear. Right and left CP angles are clear. Patient has undergone previous anterior cervical fusion of the lower cervical spine. Upper abdominal cavity appears unremarkable. The regional skeleton appears normal.    IMPRESSION:  1. No evidence of acute cardiopulmonary findings.  2. Operative fusion of the lower cervical spine.  3. No evidence of adverse change upon comparison.    Electronically signed by:  Yannick Negron MD  9/9/2022 2:20 PM CDT Workstation: [id number]                                     Medications   sodium chloride 0.9% bolus 1,000 mL (0 mLs  Intravenous Stopped 9/9/22 1445)   ondansetron injection 4 mg (4 mg Intravenous Given 9/9/22 1346)   dicyclomine capsule 20 mg (20 mg Oral Given 9/9/22 1345)   iohexoL (OMNIPAQUE 350) injection 100 mL (100 mLs Intravenous Given 9/9/22 1534)   morphine injection 2 mg (2 mg Intravenous Given 9/9/22 1621)   ondansetron injection 4 mg (4 mg Intravenous Given 9/9/22 1621)     Medical Decision Making:   History:   Old Medical Records: I decided to obtain old medical records.  Initial Assessment:   Emergent evaluation of a 47-year-old female presenting with abdominal pain differential diagnosis includes infection, soft tissue injury, electrolyte abnormality, endocrine dysfunction, infection          Attending Attestation:             Attending ED Notes:   Patient reports alleviation of pain she is tolerating p.o. in the ER, patient referred to GI for further evaluation and management and patient is to follow up with PCP in the next 2-3 days for further evaluation as well patient is cautioned to return immediately to the ER for any worsening or any further concerns    I had a detailed discussion with the patient and/or guardian regarding: The historical points, exam findings, and diagnostic results supporting the discharge diagnosis, lab results, pertinent radiology results, and the need for outpatient follow-up, for definitive care with a family practitioner and to return to the emergency department if symptoms worsen or persist or if there are any questions or concerns that arise at home. All questions have been answered in detail. Strict return to Emergency Department precautions have been provided.     A dictation software program was used for this note.  Please expect some simple typographical  errors in this note.     This patient was seen during the context of the Covid 19 global pandemic where local, state, hospital guidelines, were followed to the best of ability given the circumstances of the pandemic.                    Clinical Impression:   Final diagnoses:  [R52] Pain  [R10.13] Epigastric pain (Primary)        ED Disposition Condition    Discharge Stable          ED Prescriptions       Medication Sig Dispense Start Date End Date Auth. Provider    dicyclomine (BENTYL) 20 mg tablet Take 1 tablet (20 mg total) by mouth 2 (two) times daily. 20 tablet 9/9/2022 10/9/2022 Quirino Brown MD    ondansetron (ZOFRAN-ODT) 4 MG TbDL Take 1 tablet (4 mg total) by mouth every 6 (six) hours as needed. 12 tablet 9/9/2022 -- Quirino Brown MD          Follow-up Information       Follow up With Specialties Details Why Contact Info Additional Information    [facility] - Emergency Dept Emergency Medicine  If symptoms worsen [address] [id number] [phone] 1st floor    Edgisela Gonzalez III, MD Gastroenterology Schedule an appointment as soon as possible for a visit in 2 days  [address] [address] [id number]  [phone]                Quirino Brown MD  09/09/22 0681

## 2022-09-16 ENCOUNTER — HOSPITAL ENCOUNTER (EMERGENCY)
Facility: HOSPITAL | Age: 47
Discharge: PSYCHIATRIC OR SUBSTANCE USE FACILITY | End: 2022-09-16
Attending: EMERGENCY MEDICINE
Payer: MEDICARE

## 2022-09-16 VITALS
WEIGHT: 165 LBS | HEART RATE: 63 BPM | TEMPERATURE: 98 F | DIASTOLIC BLOOD PRESSURE: 62 MMHG | HEIGHT: 66 IN | OXYGEN SATURATION: 98 % | RESPIRATION RATE: 14 BRPM | BODY MASS INDEX: 26.52 KG/M2 | SYSTOLIC BLOOD PRESSURE: 102 MMHG

## 2022-09-16 DIAGNOSIS — R45.851 DEPRESSION WITH SUICIDAL IDEATION: Primary | ICD-10-CM

## 2022-09-16 DIAGNOSIS — F32.A DEPRESSION WITH SUICIDAL IDEATION: Primary | ICD-10-CM

## 2022-09-16 LAB
ALBUMIN SERPL BCP-MCNC: 3.9 G/DL (ref 3.5–5.2)
ALP SERPL-CCNC: 75 U/L (ref 55–135)
ALT SERPL W/O P-5'-P-CCNC: 19 U/L (ref 10–44)
AMPHET+METHAMPHET UR QL: NEGATIVE
ANION GAP SERPL CALC-SCNC: 13 MMOL/L (ref 8–16)
APAP SERPL-MCNC: <3 UG/ML (ref 10–20)
AST SERPL-CCNC: 16 U/L (ref 10–40)
BACTERIA #/AREA URNS HPF: ABNORMAL /HPF
BARBITURATES UR QL SCN>200 NG/ML: NEGATIVE
BASOPHILS # BLD AUTO: 0.12 K/UL (ref 0–0.2)
BASOPHILS NFR BLD: 1.4 % (ref 0–1.9)
BENZODIAZ UR QL SCN>200 NG/ML: ABNORMAL
BILIRUB SERPL-MCNC: 0.2 MG/DL (ref 0.1–1)
BILIRUB UR QL STRIP: NEGATIVE
BUN SERPL-MCNC: 15 MG/DL (ref 6–20)
BZE UR QL SCN: NEGATIVE
CALCIUM SERPL-MCNC: 9.1 MG/DL (ref 8.7–10.5)
CANNABINOIDS UR QL SCN: ABNORMAL
CHLORIDE SERPL-SCNC: 110 MMOL/L (ref 95–110)
CLARITY UR: CLEAR
CO2 SERPL-SCNC: 21 MMOL/L (ref 23–29)
COLOR UR: YELLOW
CREAT SERPL-MCNC: 0.8 MG/DL (ref 0.5–1.4)
CREAT UR-MCNC: 174.8 MG/DL (ref 15–325)
DIFFERENTIAL METHOD: ABNORMAL
EOSINOPHIL # BLD AUTO: 0.8 K/UL (ref 0–0.5)
EOSINOPHIL NFR BLD: 9 % (ref 0–8)
ERYTHROCYTE [DISTWIDTH] IN BLOOD BY AUTOMATED COUNT: 12.5 % (ref 11.5–14.5)
EST. GFR  (NO RACE VARIABLE): >60 ML/MIN/1.73 M^2
ETHANOL SERPL-MCNC: <10 MG/DL (ref 0–10)
GLUCOSE SERPL-MCNC: 101 MG/DL (ref 70–110)
GLUCOSE UR QL STRIP: NEGATIVE
HCT VFR BLD AUTO: 43.8 % (ref 37–48.5)
HGB BLD-MCNC: 15.1 G/DL (ref 12–16)
HGB UR QL STRIP: ABNORMAL
IMM GRANULOCYTES # BLD AUTO: 0.03 K/UL (ref 0–0.04)
IMM GRANULOCYTES NFR BLD AUTO: 0.3 % (ref 0–0.5)
KETONES UR QL STRIP: NEGATIVE
LEUKOCYTE ESTERASE UR QL STRIP: NEGATIVE
LYMPHOCYTES # BLD AUTO: 3.4 K/UL (ref 1–4.8)
LYMPHOCYTES NFR BLD: 38.2 % (ref 18–48)
MCH RBC QN AUTO: 29.5 PG (ref 27–31)
MCHC RBC AUTO-ENTMCNC: 34.5 G/DL (ref 32–36)
MCV RBC AUTO: 86 FL (ref 82–98)
METHADONE UR QL SCN>300 NG/ML: NEGATIVE
MICROSCOPIC COMMENT: ABNORMAL
MONOCYTES # BLD AUTO: 0.5 K/UL (ref 0.3–1)
MONOCYTES NFR BLD: 5.2 % (ref 4–15)
NEUTROPHILS # BLD AUTO: 4.1 K/UL (ref 1.8–7.7)
NEUTROPHILS NFR BLD: 45.9 % (ref 38–73)
NITRITE UR QL STRIP: POSITIVE
NRBC BLD-RTO: 0 /100 WBC
OPIATES UR QL SCN: NEGATIVE
PCP UR QL SCN>25 NG/ML: NEGATIVE
PH UR STRIP: 6 [PH] (ref 5–8)
PLATELET # BLD AUTO: 319 K/UL (ref 150–450)
PMV BLD AUTO: 9.1 FL (ref 9.2–12.9)
POTASSIUM SERPL-SCNC: 3.9 MMOL/L (ref 3.5–5.1)
PROT SERPL-MCNC: 7.1 G/DL (ref 6–8.4)
PROT UR QL STRIP: NEGATIVE
RBC # BLD AUTO: 5.11 M/UL (ref 4–5.4)
RBC #/AREA URNS HPF: 2 /HPF (ref 0–4)
SALICYLATES SERPL-MCNC: <5 MG/DL (ref 15–30)
SARS-COV-2 RDRP RESP QL NAA+PROBE: NEGATIVE
SODIUM SERPL-SCNC: 144 MMOL/L (ref 136–145)
SP GR UR STRIP: 1.02 (ref 1–1.03)
SQUAMOUS #/AREA URNS HPF: 2 /HPF
TOXICOLOGY INFORMATION: ABNORMAL
TSH SERPL DL<=0.005 MIU/L-ACNC: 1.34 UIU/ML (ref 0.4–4)
URN SPEC COLLECT METH UR: ABNORMAL
UROBILINOGEN UR STRIP-ACNC: NEGATIVE EU/DL
WBC # BLD AUTO: 8.88 K/UL (ref 3.9–12.7)
WBC #/AREA URNS HPF: 4 /HPF (ref 0–5)

## 2022-09-16 PROCEDURE — 25000003 PHARM REV CODE 250: Performed by: EMERGENCY MEDICINE

## 2022-09-16 PROCEDURE — 82077 ASSAY SPEC XCP UR&BREATH IA: CPT | Performed by: EMERGENCY MEDICINE

## 2022-09-16 PROCEDURE — 63600175 PHARM REV CODE 636 W HCPCS: Performed by: EMERGENCY MEDICINE

## 2022-09-16 PROCEDURE — 99285 EMERGENCY DEPT VISIT HI MDM: CPT | Mod: 25

## 2022-09-16 PROCEDURE — 80307 DRUG TEST PRSMV CHEM ANLYZR: CPT | Performed by: EMERGENCY MEDICINE

## 2022-09-16 PROCEDURE — 96361 HYDRATE IV INFUSION ADD-ON: CPT

## 2022-09-16 PROCEDURE — 80143 DRUG ASSAY ACETAMINOPHEN: CPT | Performed by: EMERGENCY MEDICINE

## 2022-09-16 PROCEDURE — 96365 THER/PROPH/DIAG IV INF INIT: CPT

## 2022-09-16 PROCEDURE — 80179 DRUG ASSAY SALICYLATE: CPT | Performed by: EMERGENCY MEDICINE

## 2022-09-16 PROCEDURE — 80053 COMPREHEN METABOLIC PANEL: CPT | Performed by: EMERGENCY MEDICINE

## 2022-09-16 PROCEDURE — U0002 COVID-19 LAB TEST NON-CDC: HCPCS | Performed by: EMERGENCY MEDICINE

## 2022-09-16 PROCEDURE — S0166 INJ OLANZAPINE 2.5MG: HCPCS | Performed by: EMERGENCY MEDICINE

## 2022-09-16 PROCEDURE — 85025 COMPLETE CBC W/AUTO DIFF WBC: CPT | Performed by: EMERGENCY MEDICINE

## 2022-09-16 PROCEDURE — 96372 THER/PROPH/DIAG INJ SC/IM: CPT | Mod: 59 | Performed by: EMERGENCY MEDICINE

## 2022-09-16 PROCEDURE — 81000 URINALYSIS NONAUTO W/SCOPE: CPT | Mod: 59 | Performed by: EMERGENCY MEDICINE

## 2022-09-16 PROCEDURE — 84443 ASSAY THYROID STIM HORMONE: CPT | Performed by: EMERGENCY MEDICINE

## 2022-09-16 RX ORDER — HYDROXYZINE PAMOATE 25 MG/1
50 CAPSULE ORAL EVERY 8 HOURS PRN
Status: DISCONTINUED | OUTPATIENT
Start: 2022-09-16 | End: 2022-09-17 | Stop reason: HOSPADM

## 2022-09-16 RX ORDER — OLANZAPINE 10 MG/2ML
10 INJECTION, POWDER, FOR SOLUTION INTRAMUSCULAR ONCE AS NEEDED
Status: COMPLETED | OUTPATIENT
Start: 2022-09-16 | End: 2022-09-16

## 2022-09-16 RX ORDER — OLANZAPINE 5 MG/1
10 TABLET, ORALLY DISINTEGRATING ORAL
Status: COMPLETED | OUTPATIENT
Start: 2022-09-16 | End: 2022-09-16

## 2022-09-16 RX ORDER — ACETAMINOPHEN 325 MG/1
650 TABLET ORAL
Status: COMPLETED | OUTPATIENT
Start: 2022-09-16 | End: 2022-09-16

## 2022-09-16 RX ORDER — OLANZAPINE 5 MG/1
10 TABLET, ORALLY DISINTEGRATING ORAL NIGHTLY
Status: DISCONTINUED | OUTPATIENT
Start: 2022-09-16 | End: 2022-09-16

## 2022-09-16 RX ORDER — HYDROXYZINE PAMOATE 25 MG/1
50 CAPSULE ORAL EVERY 8 HOURS PRN
Status: DISCONTINUED | OUTPATIENT
Start: 2022-09-16 | End: 2022-09-16

## 2022-09-16 RX ADMIN — CEFTRIAXONE 1 G: 1 INJECTION, SOLUTION INTRAVENOUS at 01:09

## 2022-09-16 RX ADMIN — HYDROXYZINE PAMOATE 50 MG: 25 CAPSULE ORAL at 01:09

## 2022-09-16 RX ADMIN — ACETAMINOPHEN 650 MG: 325 TABLET ORAL at 04:09

## 2022-09-16 RX ADMIN — OLANZAPINE 10 MG: 10 INJECTION, POWDER, LYOPHILIZED, FOR SOLUTION INTRAMUSCULAR at 07:09

## 2022-09-16 RX ADMIN — OLANZAPINE 10 MG: 5 TABLET, ORALLY DISINTEGRATING ORAL at 04:09

## 2022-09-16 RX ADMIN — HYDROXYZINE PAMOATE 50 MG: 25 CAPSULE ORAL at 06:09

## 2022-09-16 RX ADMIN — SODIUM CHLORIDE 1000 ML: 0.9 INJECTION, SOLUTION INTRAVENOUS at 02:09

## 2022-09-16 RX ADMIN — SODIUM CHLORIDE 1000 ML: 0.9 INJECTION, SOLUTION INTRAVENOUS at 01:09

## 2022-09-16 NOTE — ED NOTES
Pt's belongings itemized and placed in the top locker. Security scanned belongings and took scissors to the front office. Pt is now in green gown with ring still on her finger due to it being stuck and she has slide sandals. Pt provided socks and warm blankets and is still complaining of nausea and headache at this time.

## 2022-09-16 NOTE — ED NOTES
Pt is lying in bed at this time. Pt is awake alert and orientated. Pt denies any pain. Pt states that she is very depressed at this time

## 2022-09-16 NOTE — ED NOTES
"Pt states "This isn't working. I want the doctor to give me something else." Dr Hutchins made aware.   "

## 2022-09-16 NOTE — ED NOTES
Patient complains of racing thoughts and unable to get her brain to stop. Update provided to Dr. Hutchins and Chanell RN

## 2022-09-16 NOTE — ED PROVIDER NOTES
Encounter Date: 9/16/2022       History     Chief Complaint   Patient presents with    Suicidal     Pt states that she doesn't want to wake up. She has no plan of how she would hurt herself, just that she doesn't want to exist. Pt states that she has been off her meds for weeks now and she is trying to get to [facility] the [facility] in [place].      47-year-old female here from home via private vehicle seeking transfer to psychiatric facility.  Patient states it is been several months since she took any of her medications for bipolar disorder.  For the past several weeks she has been feeling as if her mind is racing and she cannot concentrate.  She also feels depressed and states that she does not want to live anymore.  She states that she wants to go to sleep and not wake up.  She denies any specific plan of suicide but admits that she has attempted suicide by overdose on at least 3 occasions in the past.  Patient states that she calls several local psychiatric hospitals and none had bed availability, but [facility] told her that they will have beds opening up in the morning.    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    Endometriosis     Headache     Heartburn     Hypotension     Infertility, female     Insomnia     Migraine headache     PTSD (post-traumatic stress disorder)     Trauma      Past Surgical History:   Procedure  Laterality Date    APPENDECTOMY      BLADDER SUSPENSION      CERVICAL FUSION       SECTION      CHOLECYSTECTOMY      CYSTOSCOPY WITH BIOPSY OF BLADDER N/A 2018    Procedure: CYSTOSCOPY, WITH BLADDER BIOPSY, WITH FULGURATION IF INDICATED;  Surgeon: Luna Slater MD;  Location: [facility] OR;  Service: Urology;  Laterality: N/A;    CYSTOSCOPY WITH HYDRODISTENSION OF BLADDER N/A 12/3/2019    Procedure: CYSTOSCOPY, WITH BLADDER HYDRODISTENSION;  Surgeon: Gumaro Mcgovern MD;  Location: [facility] OR;  Service: OB/GYN;  Laterality: N/A;    DILATION OF URETHRA N/A 2018    Procedure: DILATION, URETHRA;  Surgeon: Luna Slater MD;  Location: [facility] OR;  Service: Urology;  Laterality: N/A;    ESOPHAGOGASTRODUODENOSCOPY N/A 2022    Procedure: EGD (ESOPHAGOGASTRODUODENOSCOPY);  Surgeon: Satinder Rdz MD;  Location: [facility];  Service: Endoscopy;  Laterality: N/A;    HYSTERECTOMY      KNEE SURGERY Bilateral     TIBIA FRACTURE SURGERY Right     tibia/ fibula repaired with pins and screws    TOE SURGERY Left      Family History   Problem Relation Age of Onset    Hypertension Father     Hypertension Mother     Stroke Mother     Breast cancer Mother     Breast cancer Maternal Grandmother      Social History     Tobacco Use    Smoking status: Every Day     Packs/day: 1.00     Types: Cigarettes, Vaping with nicotine    Smokeless tobacco: Never   Substance Use Topics    Alcohol use: Yes     Comment: occ    Drug use: Yes     Types: Marijuana     Comment: last dose today.     Review of Systems   Constitutional: Negative.    HENT: Negative.     Eyes: Negative.    Respiratory: Negative.     Cardiovascular: Negative.    Gastrointestinal: Negative.    Endocrine: Negative.    Genitourinary: Negative.    Musculoskeletal: Negative.    Skin: Negative.    Neurological: Negative.    Psychiatric/Behavioral:  Positive for dysphoric mood and suicidal ideas.      Physical Exam     Initial Vitals [22 0223]    BP Pulse Resp Temp SpO2   (!) 82/63 76 20 97.8 °F (36.6 °C) 97 %      MAP       --         Physical Exam    Nursing note and vitals reviewed.  Constitutional: She appears well-developed and well-nourished. She is not diaphoretic. She appears distressed.   HENT:   Head: Normocephalic and atraumatic.   Right Ear: External ear normal.   Left Ear: External ear normal.   Nose: Nose normal.   Mouth/Throat: Oropharynx is clear and moist.   Eyes: Conjunctivae and EOM are normal. Pupils are equal, round, and reactive to light. No scleral icterus.   Neck: Neck supple. No JVD present.   Normal range of motion.  Cardiovascular:  Normal rate, regular rhythm, normal heart sounds and intact distal pulses.           No murmur heard.  Pulmonary/Chest: Breath sounds normal. No stridor. No respiratory distress. She has no wheezes. She has no rhonchi. She has no rales.   Abdominal: Abdomen is soft. Bowel sounds are normal. She exhibits no distension. There is no abdominal tenderness.   Musculoskeletal:         General: No tenderness or edema. Normal range of motion.      Cervical back: Normal range of motion and neck supple.     Neurological: She is alert and oriented to person, place, and time. She has normal strength. No cranial nerve deficit or sensory deficit.   Skin: Skin is warm and dry. No rash noted. No erythema.   Psychiatric:   Patient is anxious, sad, and appears depressed.       ED Course   Procedures  Labs Reviewed   CBC W/ AUTO DIFFERENTIAL - Abnormal; Notable for the following components:       Result Value    MPV 9.1 (*)     Eos # 0.8 (*)     Eosinophil % 9.0 (*)     All other components within normal limits   COMPREHENSIVE METABOLIC PANEL - Abnormal; Notable for the following components:    CO2 21 (*)     All other components within normal limits   URINALYSIS, REFLEX TO URINE CULTURE - Abnormal; Notable for the following components:    Occult Blood UA Trace (*)     Nitrite, UA Positive (*)     All other components  within normal limits    Narrative:     Preferred Collection Type->Urine, Clean Catch  Specimen Source->Urine   DRUG SCREEN PANEL, URINE EMERGENCY - Abnormal; Notable for the following components:    Benzodiazepines Presumptive Positive (*)     THC Presumptive Positive (*)     All other components within normal limits    Narrative:     Preferred Collection Type->Urine, Clean Catch  Specimen Source->Urine   ACETAMINOPHEN LEVEL - Abnormal; Notable for the following components:    Acetaminophen (Tylenol), Serum <3.0 (*)     All other components within normal limits   SALICYLATE LEVEL - Abnormal; Notable for the following components:    Salicylate Lvl <5.0 (*)     All other components within normal limits   URINALYSIS MICROSCOPIC - Abnormal; Notable for the following components:    Bacteria Moderate (*)     All other components within normal limits    Narrative:     Preferred Collection Type->Urine, Clean Catch  Specimen Source->Urine   TSH   ALCOHOL,MEDICAL (ETHANOL)   SARS-COV-2 RNA AMPLIFICATION, QUAL    Narrative:     Is the patient symptomatic?->No          Imaging Results    None          Medications   sodium chloride 0.9% bolus 1,000 mL (0 mLs Intravenous Stopped 9/16/22 4184)   acetaminophen tablet 650 mg (650 mg Oral Given 9/16/22 3553)   olanzapine zydis disintegrating tablet 10 mg (10 mg Oral Given 9/16/22 7336)     Medical Decision Making:   Differential Diagnosis:   Depression, suicidal ideation, medication noncompliance, etc.  ED Management:  Patient's lab results are unremarkable except for her urinalysis which shows positive for benzodiazepines and THC.  She states she has prescription for THC, given for treatment of PTSD.  Med list includes Xanax and Ativan.  Patient is seeking placement in a psychiatric facility, specifically [facility].  She is medically cleared, and attempts at placement will be undertaken.  Patient has requested something for mild headache so she was given Tylenol, and she also  requested something to help her sleep, so she will be given 10 mg Zyprexa by mouth.              Medically cleared for psychiatry placement: 9/16/2022  4:15 AM         Clinical Impression:   Final diagnoses:  [F32.A, R45.851] Depression with suicidal ideation (Primary)      ED Disposition Condition    Transfer to Psych Facility Stable          ED Prescriptions    None       Follow-up Information    None          Ran Hutchins MD  09/16/22 0417

## 2022-09-16 NOTE — ED TRIAGE NOTES
"Pt arrives POV with her spouse. She states that she has been off all of her psych medication for months now. She states that for weeks she has just been in a dark depression. She reports staying in bed for days on end and being unable to preform ADL's. She states that she has no motivation to preform them. She states that she hasn't eaten due to lack of appetite. She also states that she has thoughts just non stop "spinning" in her head. She is unable to rest and when she is awake she constantly is thinking she just "doesn't want to wake up," "doesn't want to exist," and "would be better off dead." Pt states that she has no active plan on how she would kill herself at this time. She reports that she has had three attempts on her life in the past- All OD. Pt reports that she called [facility] in [facility] and they had a bed available for the morning. She states that she just wanted to come here for medication because she cannot handle her racing thoughts anymore.   "

## 2022-09-16 NOTE — ED NOTES
Called [facility] to get ETA on transport.

## 2022-09-17 NOTE — ED NOTES
Pt complains that the voices in her head are still there and the medications did not help. Pt requests new medications at this time. It is explained to the pt that it has not been long enough to assess if the medications worked. Will re-assess.

## 2022-09-17 NOTE — ED NOTES
Pt is physically hitting her head because the voices aren't stopping. Pt is verbally re-directed. MD Hutchins made aware. He is re-assessing. Standing by for orders.

## 2022-09-17 NOTE — ED NOTES
I called and spoke with dispatch at [facility] for upddate on transport, she states pt is 3rd in line and they are busy with emergencies at this time, will get here as soon as possible. MD made aware.

## 2022-09-17 NOTE — ED NOTES
Pt requests different medication because medication given is not working. Stated the voices in her head will not go away and began physically hitting herself in the head. Nurse was notified and explained to pt that the doctor is looking at different medication to give her. Pt continues to be anxious and restless. Tech sitting at bedside.

## 2022-09-17 NOTE — ED NOTES
Pt has eaten sandwich and been given cup of water. Pt is resting in bed. No signs of anxiety or restlessness noted.

## 2022-09-28 ENCOUNTER — HOSPITAL ENCOUNTER (OUTPATIENT)
Facility: HOSPITAL | Age: 47
Discharge: LEFT BEFORE DISCHARGE | End: 2022-09-29
Attending: EMERGENCY MEDICINE | Admitting: INTERNAL MEDICINE
Payer: MEDICARE

## 2022-09-28 DIAGNOSIS — R11.10 VOMITING: ICD-10-CM

## 2022-09-28 DIAGNOSIS — E87.6 HYPOKALEMIA: ICD-10-CM

## 2022-09-28 DIAGNOSIS — R11.2 INTRACTABLE VOMITING WITH NAUSEA, UNSPECIFIED VOMITING TYPE: ICD-10-CM

## 2022-09-28 DIAGNOSIS — R19.7 DIARRHEA, UNSPECIFIED TYPE: ICD-10-CM

## 2022-09-28 DIAGNOSIS — K56.7 ILEUS: Primary | ICD-10-CM

## 2022-09-28 LAB
ALBUMIN SERPL BCP-MCNC: 3.3 G/DL (ref 3.5–5.2)
ALP SERPL-CCNC: 69 U/L (ref 55–135)
ALT SERPL W/O P-5'-P-CCNC: 56 U/L (ref 10–44)
ANION GAP SERPL CALC-SCNC: 8 MMOL/L (ref 8–16)
AST SERPL-CCNC: 26 U/L (ref 10–40)
BASOPHILS # BLD AUTO: 0.01 K/UL (ref 0–0.2)
BASOPHILS NFR BLD: 0.1 % (ref 0–1.9)
BILIRUB SERPL-MCNC: 0.8 MG/DL (ref 0.1–1)
BUN SERPL-MCNC: 10 MG/DL (ref 6–20)
CALCIUM SERPL-MCNC: 8.2 MG/DL (ref 8.7–10.5)
CHLORIDE SERPL-SCNC: 104 MMOL/L (ref 95–110)
CO2 SERPL-SCNC: 24 MMOL/L (ref 23–29)
CREAT SERPL-MCNC: 0.7 MG/DL (ref 0.5–1.4)
DIFFERENTIAL METHOD: ABNORMAL
EOSINOPHIL # BLD AUTO: 1.1 K/UL (ref 0–0.5)
EOSINOPHIL NFR BLD: 10.7 % (ref 0–8)
ERYTHROCYTE [DISTWIDTH] IN BLOOD BY AUTOMATED COUNT: 12.8 % (ref 11.5–14.5)
EST. GFR  (NO RACE VARIABLE): >60 ML/MIN/1.73 M^2
GLUCOSE SERPL-MCNC: 114 MG/DL (ref 70–110)
HCT VFR BLD AUTO: 42 % (ref 37–48.5)
HGB BLD-MCNC: 14.1 G/DL (ref 12–16)
IMM GRANULOCYTES # BLD AUTO: 0.05 K/UL (ref 0–0.04)
IMM GRANULOCYTES NFR BLD AUTO: 0.5 % (ref 0–0.5)
LIPASE SERPL-CCNC: 20 U/L (ref 4–60)
LYMPHOCYTES # BLD AUTO: 1.9 K/UL (ref 1–4.8)
LYMPHOCYTES NFR BLD: 18.2 % (ref 18–48)
MCH RBC QN AUTO: 30 PG (ref 27–31)
MCHC RBC AUTO-ENTMCNC: 33.6 G/DL (ref 32–36)
MCV RBC AUTO: 89 FL (ref 82–98)
MONOCYTES # BLD AUTO: 0.9 K/UL (ref 0.3–1)
MONOCYTES NFR BLD: 9.3 % (ref 4–15)
NEUTROPHILS # BLD AUTO: 6.2 K/UL (ref 1.8–7.7)
NEUTROPHILS NFR BLD: 61.2 % (ref 38–73)
NRBC BLD-RTO: 0 /100 WBC
PLATELET # BLD AUTO: 295 K/UL (ref 150–450)
PMV BLD AUTO: 9.9 FL (ref 9.2–12.9)
POTASSIUM SERPL-SCNC: 3.4 MMOL/L (ref 3.5–5.1)
PROT SERPL-MCNC: 6.2 G/DL (ref 6–8.4)
RBC # BLD AUTO: 4.7 M/UL (ref 4–5.4)
SODIUM SERPL-SCNC: 136 MMOL/L (ref 136–145)
WBC # BLD AUTO: 10.16 K/UL (ref 3.9–12.7)

## 2022-09-28 PROCEDURE — 25500020 PHARM REV CODE 255: Performed by: EMERGENCY MEDICINE

## 2022-09-28 PROCEDURE — 80053 COMPREHEN METABOLIC PANEL: CPT | Performed by: EMERGENCY MEDICINE

## 2022-09-28 PROCEDURE — 96375 TX/PRO/DX INJ NEW DRUG ADDON: CPT

## 2022-09-28 PROCEDURE — C9113 INJ PANTOPRAZOLE SODIUM, VIA: HCPCS | Performed by: EMERGENCY MEDICINE

## 2022-09-28 PROCEDURE — 99285 EMERGENCY DEPT VISIT HI MDM: CPT | Mod: 25

## 2022-09-28 PROCEDURE — 83690 ASSAY OF LIPASE: CPT | Performed by: EMERGENCY MEDICINE

## 2022-09-28 PROCEDURE — 85025 COMPLETE CBC W/AUTO DIFF WBC: CPT | Performed by: EMERGENCY MEDICINE

## 2022-09-28 PROCEDURE — 96361 HYDRATE IV INFUSION ADD-ON: CPT

## 2022-09-28 PROCEDURE — 93005 ELECTROCARDIOGRAM TRACING: CPT | Performed by: INTERNAL MEDICINE

## 2022-09-28 PROCEDURE — 93010 EKG 12-LEAD: ICD-10-PCS | Mod: ,,, | Performed by: INTERNAL MEDICINE

## 2022-09-28 PROCEDURE — 63600175 PHARM REV CODE 636 W HCPCS: Performed by: EMERGENCY MEDICINE

## 2022-09-28 PROCEDURE — 93010 ELECTROCARDIOGRAM REPORT: CPT | Mod: ,,, | Performed by: INTERNAL MEDICINE

## 2022-09-28 RX ORDER — ONDANSETRON 2 MG/ML
4 INJECTION INTRAMUSCULAR; INTRAVENOUS
Status: COMPLETED | OUTPATIENT
Start: 2022-09-28 | End: 2022-09-28

## 2022-09-28 RX ORDER — PANTOPRAZOLE SODIUM 40 MG/10ML
40 INJECTION, POWDER, LYOPHILIZED, FOR SOLUTION INTRAVENOUS
Status: COMPLETED | OUTPATIENT
Start: 2022-09-28 | End: 2022-09-28

## 2022-09-28 RX ORDER — DROPERIDOL 2.5 MG/ML
0.62 INJECTION, SOLUTION INTRAMUSCULAR; INTRAVENOUS ONCE
Status: COMPLETED | OUTPATIENT
Start: 2022-09-29 | End: 2022-09-28

## 2022-09-28 RX ADMIN — DROPERIDOL 0.62 MG: 2.5 INJECTION, SOLUTION INTRAMUSCULAR; INTRAVENOUS at 11:09

## 2022-09-28 RX ADMIN — PANTOPRAZOLE SODIUM 40 MG: 40 INJECTION, POWDER, FOR SOLUTION INTRAVENOUS at 10:09

## 2022-09-28 RX ADMIN — IOHEXOL 100 ML: 350 INJECTION, SOLUTION INTRAVENOUS at 11:09

## 2022-09-28 RX ADMIN — ONDANSETRON 4 MG: 2 INJECTION INTRAMUSCULAR; INTRAVENOUS at 10:09

## 2022-09-28 RX ADMIN — SODIUM CHLORIDE, SODIUM LACTATE, POTASSIUM CHLORIDE, AND CALCIUM CHLORIDE 1000 ML: .6; .31; .03; .02 INJECTION, SOLUTION INTRAVENOUS at 10:09

## 2022-09-28 NOTE — Clinical Note
Diagnosis: Ileus [[id number]]   Future Attending Provider: JESSICA ESPINO [[id number]]   Admitting Provider:: JESSICA ESPINO [[id number]]   Special Needs:: No Special Needs [1]

## 2022-09-29 VITALS
SYSTOLIC BLOOD PRESSURE: 103 MMHG | TEMPERATURE: 98 F | DIASTOLIC BLOOD PRESSURE: 56 MMHG | RESPIRATION RATE: 18 BRPM | BODY MASS INDEX: 26.52 KG/M2 | WEIGHT: 165 LBS | HEART RATE: 119 BPM | HEIGHT: 66 IN | OXYGEN SATURATION: 96 %

## 2022-09-29 PROBLEM — K56.7 ILEUS: Status: ACTIVE | Noted: 2022-09-29

## 2022-09-29 PROCEDURE — 63600175 PHARM REV CODE 636 W HCPCS: Performed by: EMERGENCY MEDICINE

## 2022-09-29 PROCEDURE — 63600175 PHARM REV CODE 636 W HCPCS: Performed by: INTERNAL MEDICINE

## 2022-09-29 PROCEDURE — 96361 HYDRATE IV INFUSION ADD-ON: CPT

## 2022-09-29 PROCEDURE — 96365 THER/PROPH/DIAG IV INF INIT: CPT

## 2022-09-29 PROCEDURE — G0378 HOSPITAL OBSERVATION PER HR: HCPCS

## 2022-09-29 RX ORDER — SODIUM,POTASSIUM PHOSPHATES 280-250MG
2 POWDER IN PACKET (EA) ORAL
Status: DISCONTINUED | OUTPATIENT
Start: 2022-09-29 | End: 2022-09-29 | Stop reason: HOSPADM

## 2022-09-29 RX ORDER — PANTOPRAZOLE SODIUM 40 MG/1
40 TABLET, DELAYED RELEASE ORAL DAILY
Status: DISCONTINUED | OUTPATIENT
Start: 2022-09-29 | End: 2022-09-29 | Stop reason: HOSPADM

## 2022-09-29 RX ORDER — DULOXETIN HYDROCHLORIDE 20 MG/1
20 CAPSULE, DELAYED RELEASE ORAL DAILY
Status: DISCONTINUED | OUTPATIENT
Start: 2022-09-29 | End: 2022-09-29 | Stop reason: HOSPADM

## 2022-09-29 RX ORDER — POTASSIUM CHLORIDE 7.45 MG/ML
10 INJECTION INTRAVENOUS ONCE
Status: COMPLETED | OUTPATIENT
Start: 2022-09-29 | End: 2022-09-29

## 2022-09-29 RX ORDER — SODIUM CHLORIDE, SODIUM LACTATE, POTASSIUM CHLORIDE, CALCIUM CHLORIDE 600; 310; 30; 20 MG/100ML; MG/100ML; MG/100ML; MG/100ML
INJECTION, SOLUTION INTRAVENOUS CONTINUOUS
Status: DISCONTINUED | OUTPATIENT
Start: 2022-09-29 | End: 2022-09-29 | Stop reason: HOSPADM

## 2022-09-29 RX ORDER — DIAZEPAM 5 MG/1
10 TABLET ORAL 2 TIMES DAILY
Status: DISCONTINUED | OUTPATIENT
Start: 2022-09-29 | End: 2022-09-29 | Stop reason: HOSPADM

## 2022-09-29 RX ORDER — LANOLIN ALCOHOL/MO/W.PET/CERES
800 CREAM (GRAM) TOPICAL
Status: DISCONTINUED | OUTPATIENT
Start: 2022-09-29 | End: 2022-09-29 | Stop reason: HOSPADM

## 2022-09-29 RX ORDER — MORPHINE SULFATE 2 MG/ML
2 INJECTION, SOLUTION INTRAMUSCULAR; INTRAVENOUS EVERY 4 HOURS PRN
Status: DISCONTINUED | OUTPATIENT
Start: 2022-09-29 | End: 2022-09-29 | Stop reason: HOSPADM

## 2022-09-29 RX ORDER — ONDANSETRON 2 MG/ML
4 INJECTION INTRAMUSCULAR; INTRAVENOUS EVERY 8 HOURS PRN
Status: DISCONTINUED | OUTPATIENT
Start: 2022-09-29 | End: 2022-09-29 | Stop reason: HOSPADM

## 2022-09-29 RX ORDER — ACETAMINOPHEN 325 MG/1
650 TABLET ORAL EVERY 8 HOURS PRN
Status: DISCONTINUED | OUTPATIENT
Start: 2022-09-29 | End: 2022-09-29 | Stop reason: HOSPADM

## 2022-09-29 RX ADMIN — SODIUM CHLORIDE, SODIUM LACTATE, POTASSIUM CHLORIDE, AND CALCIUM CHLORIDE: .6; .31; .03; .02 INJECTION, SOLUTION INTRAVENOUS at 03:09

## 2022-09-29 RX ADMIN — POTASSIUM CHLORIDE 10 MEQ: 7.46 INJECTION, SOLUTION INTRAVENOUS at 01:09

## 2022-09-29 NOTE — ASSESSMENT & PLAN NOTE
Patient has Non- operative ileus which is adynamic in etiology which is stable. This is inherent to her procedure. Will treat conservatively with bowel rest, IV fluids, serial abdominal exams and avoidance of GI paralytics such as narcotics or anti-spasmodics. Monitor patient closely.     Will send stool for C dif studies; hold antibiotics currently; clears; consult her GI; hydration

## 2022-09-29 NOTE — ED PROVIDER NOTES
Encounter Date: 2022       History     Chief Complaint   Patient presents with    Vomiting    Diarrhea     Pt c/o n/v/d since before the .      47-year-old female with a history of bipolar disorder, chronic abdominal pain, gastroparesis presents emergency department with vomiting and diarrhea.  She tells me that these symptoms have been intermittent over the past month but have recently intensified.  She tells me that she has vomiting and diarrhea all day.  She has diffuse abdominal pain.  She also tells me that she has diffuse body aches but denies any fever.  She took Phenergan 50 mg p.o. without any improvement symptoms so she came to the emergency department.  No recent antibiotics.    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    Endometriosis     Headache     Heartburn     Hypotension     Infertility, female     Insomnia     Migraine headache     PTSD (post-traumatic stress disorder)     Trauma      Past Surgical History:   Procedure Laterality Date    APPENDECTOMY      BLADDER SUSPENSION      CERVICAL FUSION       SECTION      CHOLECYSTECTOMY      CYSTOSCOPY WITH BIOPSY OF BLADDER N/A 2018    Procedure: CYSTOSCOPY, WITH BLADDER BIOPSY, WITH FULGURATION IF INDICATED;  Surgeon: Luna Slater MD;  Location: [facility];  Service: Urology;  Laterality: N/A;    CYSTOSCOPY WITH  HYDRODISTENSION OF BLADDER N/A 12/3/2019    Procedure: CYSTOSCOPY, WITH BLADDER HYDRODISTENSION;  Surgeon: Gumaro Mcgovern MD;  Location: [facility] OR;  Service: OB/GYN;  Laterality: N/A;    DILATION OF URETHRA N/A 12/12/2018    Procedure: DILATION, URETHRA;  Surgeon: Luna Slater MD;  Location: [facility] OR;  Service: Urology;  Laterality: N/A;    ESOPHAGOGASTRODUODENOSCOPY N/A 1/21/2022    Procedure: EGD (ESOPHAGOGASTRODUODENOSCOPY);  Surgeon: Satinder Rdz MD;  Location: [facility] ENDO;  Service: Endoscopy;  Laterality: N/A;    HYSTERECTOMY      KNEE SURGERY Bilateral     TIBIA FRACTURE SURGERY Right     tibia/ fibula repaired with pins and screws    TOE SURGERY Left      Family History   Problem Relation Age of Onset    Hypertension Father     Hypertension Mother     Stroke Mother     Breast cancer Mother     Breast cancer Maternal Grandmother      Social History     Tobacco Use    Smoking status: Every Day     Packs/day: 1.00     Types: Cigarettes, Vaping with nicotine    Smokeless tobacco: Never   Substance Use Topics    Alcohol use: Yes     Comment: occ    Drug use: Yes     Types: Marijuana     Comment: last dose today.     Review of Systems   Constitutional:  Negative for fever.   HENT:  Negative for sore throat.    Respiratory:  Negative for shortness of breath.    Cardiovascular:  Negative for chest pain.   Gastrointestinal:  Positive for abdominal pain, diarrhea, nausea and vomiting.   Genitourinary:  Negative for dysuria.   Musculoskeletal:  Negative for back pain.   Skin:  Negative for rash.   Neurological:  Negative for weakness.   Hematological:  Does not bruise/bleed easily.   All other systems reviewed and are negative.    Physical Exam     Initial Vitals [09/28/22 2136]   BP Pulse Resp Temp SpO2   127/79 (!) 119 18 98.2 °F (36.8 °C) 96 %      MAP       --         Physical Exam    Nursing note and vitals reviewed.  Constitutional: She appears well-developed and well-nourished. She appears distressed  (Appears uncomfortable, tearful).   HENT:   Head: Normocephalic and atraumatic.   Eyes: EOM are normal.   Neck:   Normal range of motion.  Cardiovascular:  Regular rhythm, normal heart sounds and intact distal pulses.           Tachycardic to 120   Pulmonary/Chest: Breath sounds normal. She has no wheezes. She has no rhonchi. She has no rales.   Abdominal: Abdomen is soft. She exhibits no distension. There is abdominal tenderness (diffuse with no rebound or guarding). There is no rebound.   Musculoskeletal:         General: Normal range of motion.      Cervical back: Normal range of motion.     Neurological: She is alert and oriented to person, place, and time. She has normal strength.   Skin: Skin is warm and dry.   Psychiatric: Thought content normal.       ED Course   Procedures  Labs Reviewed   CBC W/ AUTO DIFFERENTIAL - Abnormal; Notable for the following components:       Result Value    Immature Grans (Abs) 0.05 (*)     Eos # 1.1 (*)     Eosinophil % 10.7 (*)     All other components within normal limits   COMPREHENSIVE METABOLIC PANEL - Abnormal; Notable for the following components:    Potassium 3.4 (*)     Glucose 114 (*)     Calcium 8.2 (*)     Albumin 3.3 (*)     ALT 56 (*)     All other components within normal limits   CULTURE, STOOL   CLOSTRIDIUM DIFFICILE   LIPASE   URINALYSIS, REFLEX TO URINE CULTURE   STOOL EXAM-OVA,CYSTS,PARASITES   WBC, STOOL   OCCULT BLOOD X 1, STOOL          Imaging Results              CT Abdomen Pelvis With Contrast (Final result)  Result time 09/28/22 23:29:53      Final result by Joselin Mahoney MD (09/28/22 23:29:53)                   Narrative:    PROCEDURE: CT ABDOMEN PELVIS WITH CONTRAST    CLINICAL HISTORY: 47 years  Female  Nausea/vomiting; Abdominal pain, acute, nonlocalized    TECHNIQUE: Contiguous axial images obtained through the abdomen and pelvis following intravenous contrast administration. Coronal and sagittal reformatted images provided.    This CT exam was  performed according to our departmental dose-optimization program, which includes one or more of the following dose reduction techniques: automated exposure control, adjustment of the mA and/or kV according to patient size, and/or use of iterative reconstruction technique.    COMPARISON: 9/9/2022    FINDINGS:    Prior cholecystectomy likely accounts for mild biliary prominence.    The lung bases, liver, pancreas, spleen, adrenal glands, left kidney, and urinary bladder are normal.    Right renal scarring again noted, chronic. No hydronephrosis or pyelonephritis. Prior hysterectomy.    There is segmental, intermittent fluid distention of the small bowel no definite transition point. Fluid and gas in the colon. No bowel wall thickening, pneumatosis, free intraperitoneal air, abscess, ascites.    Chronic right-sided spondylolysis at L5. Chronic compression deformity at L1. No acute osseous abnormality.    IMPRESSION:    Suspected mild enterocolitis with a small bowel ileus. No high-grade bowel obstruction or perforation.    No other acute findings in the abdomen or pelvis.    Electronically signed by:  Joselin Mahoney MD  9/28/2022 11:29 PM CDT Workstation: [id number]                                     Medications   potassium bicarbonate disintegrating tablet 20 mEq (has no administration in time range)   lactated ringers bolus 1,000 mL (has no administration in time range)   lactated ringers bolus 1,000 mL (0 mLs Intravenous Stopped 9/29/22 0135)   pantoprazole injection 40 mg (40 mg Intravenous Given 9/28/22 2213)   ondansetron injection 4 mg (4 mg Intravenous Given 9/28/22 2200)   droperidoL injection 0.625 mg (0.625 mg Intravenous Given 9/28/22 2312)   iohexoL (OMNIPAQUE 350) injection 100 mL (100 mLs Intravenous Given 9/28/22 2301)   potassium chloride 10 mEq in 100 mL IVPB (10 mEq Intravenous New Bag 9/29/22 0135)     Medical Decision Making:   ED Management:  47-year-old female presents emergency department with  abdominal pain, vomiting and diarrhea. Differential includes obstruction, biliary disease, hepatic disease, gastritis/PUD, UTI, ureterolithiasis, diverticular disease, malignancy, mesenteric ischemia, pancreatitis.  Workup remarkable for normal white count.  She does have hypokalemia.  Lipase normal.  CT scan shows mild enterocolitis with small bowel ileus.  Attempted to replete potassium and tear early however patient began vomiting again.  She still remains extremely symptomatic.  Will consult hospitalist for admission for further management of ileus and hypokalemia.  Stool studies pending.    Lamar Thomas MD  Emergency Medicine  09/29/2022 1:44 AM                                Clinical Impression:   Final diagnoses:  [R11.10] Vomiting  [K56.7] Ileus (Primary)  [R11.2] Intractable vomiting with nausea, unspecified vomiting type  [R19.7] Diarrhea, unspecified type  [E87.6] Hypokalemia        ED Disposition Condition    Admit Stable                Lamar Thomas MD  09/29/22 0144

## 2022-09-29 NOTE — H&P
"[facility] - Emergency Dept  Hospital Medicine  History & Physical    Patient Name: Rosalina Jacobo  MRN: [id number]  Patient Class: OP- Observation  Admission Date: 9/28/2022  Attending Physician: Ron Renee MD  Primary Care Provider: Alberto Fairchild MD         Patient information was obtained from patient, past medical records, ER records and ED MD.     Subjective:     Principal Problem:Ileus    Chief Complaint:   Chief Complaint   Patient presents with    Vomiting    Diarrhea     Pt c/o n/v/d since before the 9th of September.         HPI: 47 year old female with history of Bipolar Disorder, Migraine, CVA, Gastroparesis ([employer]) presented to ED reportedly at the request of her Gastroenterologist for N/V and loose stools. Vomitus: foodstuffs; no blood or coffee grounds. Diarrhea: brown watery; no blood or black. Has had intermittent generalized abdominal discomfort aching, waxing and waning of own accord over the past 1-2 days no rads.    In ED: Labs reviewed and noted below; normal CBC; minimal hypokalemia (treated in ED) with normal renal function; normal transaminases, bilirubin and lipase. CT Abdo/Pelvis: "Suspected mild enterocolitis with a small bowel ileus. No high-grade bowel obstruction or perforation. No other acute findings in the abdomen or pelvis".    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    Endometriosis     Headache     Heartburn     Hypotension     Infertility, female     Insomnia     Migraine headache     PTSD (post-traumatic stress disorder)     Trauma        Past Surgical History: "   Procedure Laterality Date    APPENDECTOMY      BLADDER SUSPENSION      CERVICAL FUSION       SECTION      CHOLECYSTECTOMY      CYSTOSCOPY WITH BIOPSY OF BLADDER N/A 2018    Procedure: CYSTOSCOPY, WITH BLADDER BIOPSY, WITH FULGURATION IF INDICATED;  Surgeon: Luna Slater MD;  Location: [facility] OR;  Service: Urology;  Laterality: N/A;    CYSTOSCOPY WITH HYDRODISTENSION OF BLADDER N/A 12/3/2019    Procedure: CYSTOSCOPY, WITH BLADDER HYDRODISTENSION;  Surgeon: Gumaro Mcgovern MD;  [facility];  Service: OB/GYN;  Laterality: N/A;    DILATION OF URETHRA N/A 2018    Procedure: DILATION, URETHRA;  Surgeon: Luna Slater MD;  Location: [facility] OR;  Service: Urology;  Laterality: N/A;    ESOPHAGOGASTRODUODENOSCOPY N/A 2022    Procedure: EGD (ESOPHAGOGASTRODUODENOSCOPY);  Surgeon: Satinder Rdz MD;  Location: [facility];  Service: Endoscopy;  Laterality: N/A;    HYSTERECTOMY      KNEE SURGERY Bilateral     TIBIA FRACTURE SURGERY Right     tibia/ fibula repaired with pins and screws    TOE SURGERY Left        Review of patient's allergies indicates:   Allergen Reactions    Quetiapine Other (See Comments)     Dystonic reaction  Other reaction(s): Unknown    Aspirin     Chlorpromazine      Other reaction(s): Unknown    Gabapentin      Other reaction(s): Unknown    Haloperidol lactate      Other reaction(s): Unknown    Seroquel [quetiapine]     Pcn [penicillins] Rash       Current Facility-Administered Medications on File Prior to Encounter   Medication    lactated ringers infusion    sodium chloride 0.9% flush 3 mL     Current Outpatient Medications on File Prior to Encounter   Medication Sig    ALPRAZolam (XANAX) 0.25 MG tablet Take 2 tablets (0.5 mg total) by mouth 3 (three) times daily as needed for Anxiety.    azithromycin (ZITHROMAX) 500 MG tablet Take 1 tablet by mouth once daily for 5 days    cariprazine (VRAYLAR) 3 mg Cap Take 1 capsule (3 mg  total) by mouth once daily.    clindamycin (CLEOCIN) 150 MG capsule TAKE 1 CAPSULE BY MOUTH EVERY 6 HOURS UNTIL GONE    diazePAM (VALIUM) 10 MG Tab Take 1 tablet twice a day    dicyclomine (BENTYL) 20 mg tablet Take 1 tablet (20 mg total) by mouth 2 (two) times daily.    DULoxetine (CYMBALTA) 20 MG capsule Take 1 capsule (20 mg total) by mouth once daily.    DULoxetine (CYMBALTA) 20 MG capsule TAKE 1 CAPSULE BY MOUTH ONCE DAILY.    ergocalciferol (ERGOCALCIFEROL) 50,000 unit Cap Take one capsule by mouth once per week (Patient taking differently: Take 50,000 Units by mouth every 7 days.)    haloperidoL (HALDOL) 10 MG tablet Take 1/2 tablet by mouth once daily for 1 week then 1 tablet by mouth once daily    HYDROcodone-acetaminophen (NORCO)  mg per tablet TAKE 1 TABLET BY MOUTH EVERY 6 HOURS AS NEEDED FOR DENTAL PAIN. Not to exceed 4 tablets in a 24 hours period.    neomycin-polymyxin-dexamethasone (MAXITROL) 3.5mg/mL-10,000 unit/mL-0.1 % DrpS Place 1 drop in the left eye three times daily for 1 week    ondansetron (ZOFRAN-ODT) 4 MG TbDL Take 1 tablet (4 mg total) by mouth every 6 (six) hours as needed.    pantoprazole (PROTONIX) 40 MG tablet Take 1 tablet (40 mg total) by mouth once daily. for 30 doses    rifAXIMin (XIFAXAN) 550 mg Tab Take 1 tablet by mouth 3 times daily for 14 days.    risperiDONE (RISPERDAL) 2 MG tablet TAKE 1 TABLET BY MOUTH TWICE DAILY.    rizatriptan (MAXALT) 10 MG tablet Take 1 tablet by mouth once.    [DISCONTINUED] ondansetron (ZOFRAN) 4 MG tablet Take 1 tablet (4 mg total) by mouth every 8 (eight) hours as needed for Nausea.     Family History       Problem Relation (Age of Onset)    Breast cancer Mother, Maternal Grandmother    Hypertension Father, Mother    Stroke Mother          Tobacco Use    Smoking status: Every Day     Packs/day: 1.00     Types: Cigarettes, Vaping with nicotine    Smokeless tobacco: Never   Substance and Sexual Activity    Alcohol use: Yes      Comment: occ    Drug use: Yes     Types: Marijuana     Comment: last dose today.    Sexual activity: Yes     Partners: Male     Birth control/protection: See Surgical Hx     Review of Systems   Constitutional:  Positive for fatigue.   HENT: Negative.     Eyes: Negative.    Respiratory: Negative.     Cardiovascular: Negative.    Gastrointestinal:  Positive for abdominal distention, abdominal pain, diarrhea, nausea and vomiting.   Endocrine: Negative.    Genitourinary: Negative.    Musculoskeletal: Negative.    Skin: Negative.    Allergic/Immunologic: Negative.    Neurological: Negative.    Hematological: Negative.    All other systems reviewed and are negative.  Objective:     Vital Signs (Most Recent):  Temp: 98.2 °F (36.8 °C) (09/28/22 2136)  Pulse: (!) 119 (09/28/22 2136)  Resp: 18 (09/28/22 2136)  BP: 127/79 (09/28/22 2136)  SpO2: 96 % (09/28/22 2136)   Vital Signs (24h Range):  Temp:  [98.2 °F (36.8 °C)] 98.2 °F (36.8 °C)  Pulse:  [119] 119  Resp:  [18] 18  SpO2:  [96 %] 96 %  BP: (127)/(79) 127/79     Weight: 74.8 kg (165 lb)  Body mass index is 26.63 kg/m².    Physical Exam  Vitals and nursing note reviewed.   Constitutional:       Appearance: She is well-developed. She is obese.   HENT:      Head: Normocephalic and atraumatic.      Right Ear: External ear normal.      Left Ear: External ear normal.      Nose: Nose normal.   Eyes:      Conjunctiva/sclera: Conjunctivae normal.      Pupils: Pupils are equal, round, and reactive to light.   Cardiovascular:      Rate and Rhythm: Normal rate and regular rhythm.      Heart sounds: Normal heart sounds.   Pulmonary:      Effort: Pulmonary effort is normal.      Breath sounds: Normal breath sounds.      Comments: Decreased entry without adventitious sounds  Abdominal:      General: Bowel sounds are normal.      Palpations: Abdomen is soft.      Comments: Soft, voluntary guarding without peritoneal signs; BS+   Musculoskeletal:         General: Normal range of  motion.      Cervical back: Normal range of motion and neck supple.   Skin:     General: Skin is warm and dry.      Capillary Refill: Capillary refill takes less than 2 seconds.      Comments: Vitiligo    Neurological:      Mental Status: She is alert and oriented to person, place, and time.   Psychiatric:         Behavior: Behavior normal.         Thought Content: Thought content normal.         Judgment: Judgment normal.         CRANIAL NERVES     CN III, IV, VI   Pupils are equal, round, and reactive to light.     Significant Labs: All pertinent labs within the past 24 hours have been reviewed.  CBC:   Recent Labs   Lab 09/28/22  2214   WBC 10.16   HGB 14.1   HCT 42.0        CMP:   Recent Labs   Lab 09/28/22  2214      K 3.4*      CO2 24   *   BUN 10   CREATININE 0.7   CALCIUM 8.2*   PROT 6.2   ALBUMIN 3.3*   BILITOT 0.8   ALKPHOS 69   AST 26   ALT 56*   ANIONGAP 8     Cardiac Markers: No results for input(s): CKMB, MYOGLOBIN, BNP, TROPISTAT in the last 48 hours.  Troponin: No results for input(s): TROPONINI, TROPONINIHS in the last 48 hours.    Significant Imaging: I have reviewed all pertinent imaging results/findings within the past 24 hours.    Assessment/Plan:     * Ileus  Patient has Non- operative ileus which is adynamic in etiology which is stable. This is inherent to her procedure. Will treat conservatively with bowel rest, IV fluids, serial abdominal exams and avoidance of GI paralytics such as narcotics or anti-spasmodics. Monitor patient closely.     Will send stool for C dif studies; hold antibiotics currently; clears; consult her GI; hydration      VTE Risk Mitigation (From admission, onward)    None             Ron Renee MD  Department of Hospital Medicine   [facility] - Emergency Dept

## 2022-09-29 NOTE — SUBJECTIVE & OBJECTIVE
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    Endometriosis     Headache     Heartburn     Hypotension     Infertility, female     Insomnia     Migraine headache     PTSD (post-traumatic stress disorder)     Trauma        Past Surgical History:   Procedure Laterality Date    APPENDECTOMY      BLADDER SUSPENSION      CERVICAL FUSION       SECTION      CHOLECYSTECTOMY      CYSTOSCOPY WITH BIOPSY OF BLADDER N/A 2018    Procedure: CYSTOSCOPY, WITH BLADDER BIOPSY, WITH FULGURATION IF INDICATED;  Surgeon: Luna Slater MD;  Location: [facility] OR;  Service: Urology;  Laterality: N/A;    CYSTOSCOPY WITH HYDRODISTENSION OF BLADDER N/A 12/3/2019    Procedure: CYSTOSCOPY, WITH BLADDER HYDRODISTENSION;  Surgeon: Gumaro Mcgovern MD;  Location: [facility] OR;  Service: OB/GYN;  Laterality: N/A;    DILATION OF URETHRA N/A 2018    Procedure: DILATION, URETHRA;  Surgeon: Luna Slater MD;  Location: [facility] OR;  Service: Urology;  Laterality: N/A;    ESOPHAGOGASTRODUODENOSCOPY N/A 2022    Procedure: EGD (ESOPHAGOGASTRODUODENOSCOPY);  Surgeon: Satinder Rdz MD;  Location: [facility];  Service: Endoscopy;  Laterality: N/A;    HYSTERECTOMY      KNEE SURGERY Bilateral     TIBIA FRACTURE SURGERY Right     tibia/ fibula repaired with pins and screws    TOE SURGERY Left        Review of patient's allergies indicates:   Allergen Reactions    Quetiapine Other (See Comments)     Dystonic reaction  Other reaction(s): Unknown    Aspirin     Chlorpromazine      Other reaction(s): Unknown    Gabapentin      Other reaction(s): Unknown    Haloperidol lactate      Other reaction(s): Unknown    Seroquel [quetiapine]     Pcn [penicillins] Rash       Current Facility-Administered  Medications on File Prior to Encounter   Medication    lactated ringers infusion    sodium chloride 0.9% flush 3 mL     Current Outpatient Medications on File Prior to Encounter   Medication Sig    ALPRAZolam (XANAX) 0.25 MG tablet Take 2 tablets (0.5 mg total) by mouth 3 (three) times daily as needed for Anxiety.    azithromycin (ZITHROMAX) 500 MG tablet Take 1 tablet by mouth once daily for 5 days    cariprazine (VRAYLAR) 3 mg Cap Take 1 capsule (3 mg total) by mouth once daily.    clindamycin (CLEOCIN) 150 MG capsule TAKE 1 CAPSULE BY MOUTH EVERY 6 HOURS UNTIL GONE    diazePAM (VALIUM) 10 MG Tab Take 1 tablet twice a day    dicyclomine (BENTYL) 20 mg tablet Take 1 tablet (20 mg total) by mouth 2 (two) times daily.    DULoxetine (CYMBALTA) 20 MG capsule Take 1 capsule (20 mg total) by mouth once daily.    DULoxetine (CYMBALTA) 20 MG capsule TAKE 1 CAPSULE BY MOUTH ONCE DAILY.    ergocalciferol (ERGOCALCIFEROL) 50,000 unit Cap Take one capsule by mouth once per week (Patient taking differently: Take 50,000 Units by mouth every 7 days.)    haloperidoL (HALDOL) 10 MG tablet Take 1/2 tablet by mouth once daily for 1 week then 1 tablet by mouth once daily    HYDROcodone-acetaminophen (NORCO)  mg per tablet TAKE 1 TABLET BY MOUTH EVERY 6 HOURS AS NEEDED FOR DENTAL PAIN. Not to exceed 4 tablets in a 24 hours period.    neomycin-polymyxin-dexamethasone (MAXITROL) 3.5mg/mL-10,000 unit/mL-0.1 % DrpS Place 1 drop in the left eye three times daily for 1 week    ondansetron (ZOFRAN-ODT) 4 MG TbDL Take 1 tablet (4 mg total) by mouth every 6 (six) hours as needed.    pantoprazole (PROTONIX) 40 MG tablet Take 1 tablet (40 mg total) by mouth once daily. for 30 doses    rifAXIMin (XIFAXAN) 550 mg Tab Take 1 tablet by mouth 3 times daily for 14 days.    risperiDONE (RISPERDAL) 2 MG tablet TAKE 1 TABLET BY MOUTH TWICE DAILY.    rizatriptan (MAXALT) 10 MG tablet Take 1 tablet by mouth once.    [DISCONTINUED] ondansetron (ZOFRAN)  4 MG tablet Take 1 tablet (4 mg total) by mouth every 8 (eight) hours as needed for Nausea.     Family History       Problem Relation (Age of Onset)    Breast cancer Mother, Maternal Grandmother    Hypertension Father, Mother    Stroke Mother          Tobacco Use    Smoking status: Every Day     Packs/day: 1.00     Types: Cigarettes, Vaping with nicotine    Smokeless tobacco: Never   Substance and Sexual Activity    Alcohol use: Yes     Comment: occ    Drug use: Yes     Types: Marijuana     Comment: last dose today.    Sexual activity: Yes     Partners: Male     Birth control/protection: See Surgical Hx     Review of Systems   Constitutional:  Positive for fatigue.   HENT: Negative.     Eyes: Negative.    Respiratory: Negative.     Cardiovascular: Negative.    Gastrointestinal:  Positive for abdominal distention, abdominal pain, diarrhea, nausea and vomiting.   Endocrine: Negative.    Genitourinary: Negative.    Musculoskeletal: Negative.    Skin: Negative.    Allergic/Immunologic: Negative.    Neurological: Negative.    Hematological: Negative.    All other systems reviewed and are negative.  Objective:     Vital Signs (Most Recent):  Temp: 98.2 °F (36.8 °C) (09/28/22 2136)  Pulse: (!) 119 (09/28/22 2136)  Resp: 18 (09/28/22 2136)  BP: 127/79 (09/28/22 2136)  SpO2: 96 % (09/28/22 2136)   Vital Signs (24h Range):  Temp:  [98.2 °F (36.8 °C)] 98.2 °F (36.8 °C)  Pulse:  [119] 119  Resp:  [18] 18  SpO2:  [96 %] 96 %  BP: (127)/(79) 127/79     Weight: 74.8 kg (165 lb)  Body mass index is 26.63 kg/m².    Physical Exam  Vitals and nursing note reviewed.   Constitutional:       Appearance: She is well-developed. She is obese.   HENT:      Head: Normocephalic and atraumatic.      Right Ear: External ear normal.      Left Ear: External ear normal.      Nose: Nose normal.   Eyes:      Conjunctiva/sclera: Conjunctivae normal.      Pupils: Pupils are equal, round, and reactive to light.   Cardiovascular:      Rate and Rhythm:  Normal rate and regular rhythm.      Heart sounds: Normal heart sounds.   Pulmonary:      Effort: Pulmonary effort is normal.      Breath sounds: Normal breath sounds.      Comments: Decreased entry without adventitious sounds  Abdominal:      General: Bowel sounds are normal.      Palpations: Abdomen is soft.      Comments: Soft, voluntary guarding without peritoneal signs; BS+   Musculoskeletal:         General: Normal range of motion.      Cervical back: Normal range of motion and neck supple.   Skin:     General: Skin is warm and dry.      Capillary Refill: Capillary refill takes less than 2 seconds.      Comments: Vitiligo    Neurological:      Mental Status: She is alert and oriented to person, place, and time.   Psychiatric:         Behavior: Behavior normal.         Thought Content: Thought content normal.         Judgment: Judgment normal.         CRANIAL NERVES     CN III, IV, VI   Pupils are equal, round, and reactive to light.     Significant Labs: All pertinent labs within the past 24 hours have been reviewed.  CBC:   Recent Labs   Lab 09/28/22  2214   WBC 10.16   HGB 14.1   HCT 42.0        CMP:   Recent Labs   Lab 09/28/22  2214      K 3.4*      CO2 24   *   BUN 10   CREATININE 0.7   CALCIUM 8.2*   PROT 6.2   ALBUMIN 3.3*   BILITOT 0.8   ALKPHOS 69   AST 26   ALT 56*   ANIONGAP 8     Cardiac Markers: No results for input(s): CKMB, MYOGLOBIN, BNP, TROPISTAT in the last 48 hours.  Troponin: No results for input(s): TROPONINI, TROPONINIHS in the last 48 hours.    Significant Imaging: I have reviewed all pertinent imaging results/findings within the past 24 hours.

## 2022-09-29 NOTE — HPI
"47 year old female with history of Bipolar Disorder, Migraine, CVA, Gastroparesis (Polypharmaceutic's) presented to ED reportedly at the request of her Gastroenterologist for N/V and loose stools. Vomitus: foodstuffs; no blood or coffee grounds. Diarrhea: brown watery; no blood or black. Has had intermittent generalized abdominal discomfort aching, waxing and waning of own accord over the past 1-2 days no rads.    In ED: Labs reviewed and noted below; normal CBC; minimal hypokalemia (treated in ED) with normal renal function; normal transaminases, bilirubin and lipase. CT Abdo/Pelvis: "Suspected mild enterocolitis with a small bowel ileus. No high-grade bowel obstruction or perforation. No other acute findings in the abdomen or pelvis".    Discussed with ED MD: Will send stool for C dif studies; hold antibiotics currently; clears; consult her GI; hydration    "

## 2022-09-29 NOTE — ED NOTES
Patient signed AMA after being informed at length of the risks of doing so, verbalized understanding.

## 2022-10-02 ENCOUNTER — HOSPITAL ENCOUNTER (INPATIENT)
Facility: HOSPITAL | Age: 47
LOS: 2 days | Discharge: HOME | DRG: 392 | End: 2022-10-04
Attending: EMERGENCY MEDICINE | Admitting: INTERNAL MEDICINE
Payer: MEDICARE

## 2022-10-02 DIAGNOSIS — K56.7 ILEUS: ICD-10-CM

## 2022-10-02 DIAGNOSIS — K31.84 GASTROPARESIS: Primary | ICD-10-CM

## 2022-10-02 DIAGNOSIS — E87.6 HYPOKALEMIA: ICD-10-CM

## 2022-10-02 PROBLEM — R65.20 SEVERE SEPSIS: Status: ACTIVE | Noted: 2022-10-02

## 2022-10-02 PROBLEM — A41.9 SEVERE SEPSIS: Status: ACTIVE | Noted: 2022-10-02

## 2022-10-02 PROBLEM — N30.90 CYSTITIS: Status: ACTIVE | Noted: 2022-10-02

## 2022-10-02 LAB
ALBUMIN SERPL BCP-MCNC: 2.7 G/DL (ref 3.5–5.2)
ALP SERPL-CCNC: 99 U/L (ref 55–135)
ALT SERPL W/O P-5'-P-CCNC: 24 U/L (ref 10–44)
ANION GAP SERPL CALC-SCNC: 10 MMOL/L (ref 8–16)
AST SERPL-CCNC: 18 U/L (ref 10–40)
BACTERIA #/AREA URNS HPF: ABNORMAL /HPF
BASOPHILS # BLD AUTO: 0.09 K/UL (ref 0–0.2)
BASOPHILS NFR BLD: 0.7 % (ref 0–1.9)
BILIRUB SERPL-MCNC: 0.4 MG/DL (ref 0.1–1)
BILIRUB UR QL STRIP: NEGATIVE
BUN SERPL-MCNC: 14 MG/DL (ref 6–20)
CALCIUM SERPL-MCNC: 7.9 MG/DL (ref 8.7–10.5)
CHLORIDE SERPL-SCNC: 100 MMOL/L (ref 95–110)
CLARITY UR: ABNORMAL
CO2 SERPL-SCNC: 23 MMOL/L (ref 23–29)
COLOR UR: YELLOW
CREAT SERPL-MCNC: 1.1 MG/DL (ref 0.5–1.4)
DIFFERENTIAL METHOD: ABNORMAL
EOSINOPHIL # BLD AUTO: 1 K/UL (ref 0–0.5)
EOSINOPHIL NFR BLD: 7.1 % (ref 0–8)
ERYTHROCYTE [DISTWIDTH] IN BLOOD BY AUTOMATED COUNT: 13.2 % (ref 11.5–14.5)
EST. GFR  (NO RACE VARIABLE): >60 ML/MIN/1.73 M^2
GLUCOSE SERPL-MCNC: 128 MG/DL (ref 70–110)
GLUCOSE UR QL STRIP: NEGATIVE
HCT VFR BLD AUTO: 46.7 % (ref 37–48.5)
HGB BLD-MCNC: 16.1 G/DL (ref 12–16)
HGB UR QL STRIP: NEGATIVE
HYALINE CASTS #/AREA URNS LPF: 84 /LPF
IMM GRANULOCYTES # BLD AUTO: 0.43 K/UL (ref 0–0.04)
IMM GRANULOCYTES NFR BLD AUTO: 3.2 % (ref 0–0.5)
KETONES UR QL STRIP: NEGATIVE
LACTATE SERPL-SCNC: 2.3 MMOL/L (ref 0.5–1.9)
LEUKOCYTE ESTERASE UR QL STRIP: ABNORMAL
LIPASE SERPL-CCNC: 19 U/L (ref 4–60)
LYMPHOCYTES # BLD AUTO: 3.7 K/UL (ref 1–4.8)
LYMPHOCYTES NFR BLD: 27.5 % (ref 18–48)
MCH RBC QN AUTO: 29.5 PG (ref 27–31)
MCHC RBC AUTO-ENTMCNC: 34.5 G/DL (ref 32–36)
MCV RBC AUTO: 86 FL (ref 82–98)
MICROSCOPIC COMMENT: ABNORMAL
MONOCYTES # BLD AUTO: 0.7 K/UL (ref 0.3–1)
MONOCYTES NFR BLD: 5.1 % (ref 4–15)
NEUTROPHILS # BLD AUTO: 7.5 K/UL (ref 1.8–7.7)
NEUTROPHILS NFR BLD: 56.4 % (ref 38–73)
NITRITE UR QL STRIP: NEGATIVE
NRBC BLD-RTO: 0 /100 WBC
PH UR STRIP: 6 [PH] (ref 5–8)
PLATELET # BLD AUTO: 496 K/UL (ref 150–450)
PMV BLD AUTO: 8.5 FL (ref 9.2–12.9)
POTASSIUM SERPL-SCNC: 2.7 MMOL/L (ref 3.5–5.1)
PROT SERPL-MCNC: 5.5 G/DL (ref 6–8.4)
PROT UR QL STRIP: ABNORMAL
RBC # BLD AUTO: 5.45 M/UL (ref 4–5.4)
RBC #/AREA URNS HPF: 3 /HPF (ref 0–4)
SARS-COV-2 RDRP RESP QL NAA+PROBE: NEGATIVE
SODIUM SERPL-SCNC: 133 MMOL/L (ref 136–145)
SP GR UR STRIP: 1.01 (ref 1–1.03)
SQUAMOUS #/AREA URNS HPF: 54 /HPF
URN SPEC COLLECT METH UR: ABNORMAL
UROBILINOGEN UR STRIP-ACNC: NEGATIVE EU/DL
WBC # BLD AUTO: 13.29 K/UL (ref 3.9–12.7)
WBC #/AREA URNS HPF: 42 /HPF (ref 0–5)

## 2022-10-02 PROCEDURE — 96367 TX/PROPH/DG ADDL SEQ IV INF: CPT

## 2022-10-02 PROCEDURE — 25000003 PHARM REV CODE 250: Performed by: EMERGENCY MEDICINE

## 2022-10-02 PROCEDURE — 96375 TX/PRO/DX INJ NEW DRUG ADDON: CPT

## 2022-10-02 PROCEDURE — 81001 URINALYSIS AUTO W/SCOPE: CPT | Performed by: EMERGENCY MEDICINE

## 2022-10-02 PROCEDURE — U0002 COVID-19 LAB TEST NON-CDC: HCPCS | Performed by: EMERGENCY MEDICINE

## 2022-10-02 PROCEDURE — 93010 EKG 12-LEAD: ICD-10-PCS | Mod: ,,, | Performed by: GENERAL PRACTICE

## 2022-10-02 PROCEDURE — 80053 COMPREHEN METABOLIC PANEL: CPT | Performed by: EMERGENCY MEDICINE

## 2022-10-02 PROCEDURE — 85025 COMPLETE CBC W/AUTO DIFF WBC: CPT | Performed by: EMERGENCY MEDICINE

## 2022-10-02 PROCEDURE — 87040 BLOOD CULTURE FOR BACTERIA: CPT | Performed by: INTERNAL MEDICINE

## 2022-10-02 PROCEDURE — 83690 ASSAY OF LIPASE: CPT | Performed by: EMERGENCY MEDICINE

## 2022-10-02 PROCEDURE — 63600175 PHARM REV CODE 636 W HCPCS: Performed by: EMERGENCY MEDICINE

## 2022-10-02 PROCEDURE — 93010 ELECTROCARDIOGRAM REPORT: CPT | Mod: ,,, | Performed by: GENERAL PRACTICE

## 2022-10-02 PROCEDURE — 99285 EMERGENCY DEPT VISIT HI MDM: CPT | Mod: 25

## 2022-10-02 PROCEDURE — 25500020 PHARM REV CODE 255: Performed by: EMERGENCY MEDICINE

## 2022-10-02 PROCEDURE — 96366 THER/PROPH/DIAG IV INF ADDON: CPT

## 2022-10-02 PROCEDURE — 93005 ELECTROCARDIOGRAM TRACING: CPT | Performed by: GENERAL PRACTICE

## 2022-10-02 PROCEDURE — 83605 ASSAY OF LACTIC ACID: CPT | Performed by: EMERGENCY MEDICINE

## 2022-10-02 PROCEDURE — 21000000 HC CCU ICU ROOM CHARGE

## 2022-10-02 PROCEDURE — 87086 URINE CULTURE/COLONY COUNT: CPT | Performed by: EMERGENCY MEDICINE

## 2022-10-02 PROCEDURE — 96365 THER/PROPH/DIAG IV INF INIT: CPT

## 2022-10-02 RX ORDER — LEVOFLOXACIN 5 MG/ML
750 INJECTION, SOLUTION INTRAVENOUS
Status: COMPLETED | OUTPATIENT
Start: 2022-10-02 | End: 2022-10-02

## 2022-10-02 RX ORDER — LANOLIN ALCOHOL/MO/W.PET/CERES
800 CREAM (GRAM) TOPICAL
Status: DISCONTINUED | OUTPATIENT
Start: 2022-10-03 | End: 2022-10-04 | Stop reason: HOSPADM

## 2022-10-02 RX ORDER — KETOROLAC TROMETHAMINE 30 MG/ML
15 INJECTION, SOLUTION INTRAMUSCULAR; INTRAVENOUS
Status: COMPLETED | OUTPATIENT
Start: 2022-10-02 | End: 2022-10-02

## 2022-10-02 RX ORDER — DIPHENHYDRAMINE HYDROCHLORIDE 50 MG/ML
12.5 INJECTION INTRAMUSCULAR; INTRAVENOUS
Status: COMPLETED | OUTPATIENT
Start: 2022-10-02 | End: 2022-10-02

## 2022-10-02 RX ORDER — SODIUM,POTASSIUM PHOSPHATES 280-250MG
2 POWDER IN PACKET (EA) ORAL
Status: DISCONTINUED | OUTPATIENT
Start: 2022-10-03 | End: 2022-10-04 | Stop reason: HOSPADM

## 2022-10-02 RX ORDER — ONDANSETRON 2 MG/ML
4 INJECTION INTRAMUSCULAR; INTRAVENOUS
Status: COMPLETED | OUTPATIENT
Start: 2022-10-02 | End: 2022-10-02

## 2022-10-02 RX ORDER — POTASSIUM CHLORIDE 7.45 MG/ML
10 INJECTION INTRAVENOUS ONCE
Status: COMPLETED | OUTPATIENT
Start: 2022-10-02 | End: 2022-10-02

## 2022-10-02 RX ORDER — BUSPIRONE HYDROCHLORIDE 10 MG/1
10 TABLET ORAL 2 TIMES DAILY
COMMUNITY
Start: 2022-09-22

## 2022-10-02 RX ORDER — CHLORPROMAZINE HYDROCHLORIDE 50 MG/1
50 TABLET, FILM COATED ORAL 3 TIMES DAILY
COMMUNITY
Start: 2022-09-22

## 2022-10-02 RX ORDER — MONTELUKAST SODIUM 10 MG/1
1 TABLET ORAL
COMMUNITY
End: 2022-10-02

## 2022-10-02 RX ORDER — ONDANSETRON HYDROCHLORIDE 8 MG/1
8 TABLET, FILM COATED ORAL 3 TIMES DAILY
COMMUNITY
Start: 2022-09-30 | End: 2022-10-02

## 2022-10-02 RX ADMIN — SODIUM CHLORIDE 1000 ML: 0.9 INJECTION, SOLUTION INTRAVENOUS at 07:10

## 2022-10-02 RX ADMIN — IOHEXOL 100 ML: 350 INJECTION, SOLUTION INTRAVENOUS at 08:10

## 2022-10-02 RX ADMIN — LEVOFLOXACIN 750 MG: 5 INJECTION, SOLUTION INTRAVENOUS at 09:10

## 2022-10-02 RX ADMIN — SODIUM CHLORIDE 1000 ML: 0.9 INJECTION, SOLUTION INTRAVENOUS at 09:10

## 2022-10-02 RX ADMIN — DIPHENHYDRAMINE HYDROCHLORIDE 12.5 MG: 50 INJECTION, SOLUTION INTRAMUSCULAR; INTRAVENOUS at 07:10

## 2022-10-02 RX ADMIN — ONDANSETRON 4 MG: 2 INJECTION, SOLUTION INTRAMUSCULAR; INTRAVENOUS at 07:10

## 2022-10-02 RX ADMIN — POTASSIUM CHLORIDE 10 MEQ: 7.46 INJECTION, SOLUTION INTRAVENOUS at 07:10

## 2022-10-02 RX ADMIN — KETOROLAC TROMETHAMINE 15 MG: 30 INJECTION, SOLUTION INTRAMUSCULAR; INTRAVENOUS at 07:10

## 2022-10-03 LAB
ANION GAP SERPL CALC-SCNC: 6 MMOL/L (ref 8–16)
BASOPHILS # BLD AUTO: 0.06 K/UL (ref 0–0.2)
BASOPHILS NFR BLD: 0.6 % (ref 0–1.9)
BUN SERPL-MCNC: 8 MG/DL (ref 6–20)
C DIFF GDH STL QL: NEGATIVE
C DIFF TOX A+B STL QL IA: NEGATIVE
CALCIUM SERPL-MCNC: 7.3 MG/DL (ref 8.7–10.5)
CHLORIDE SERPL-SCNC: 108 MMOL/L (ref 95–110)
CO2 SERPL-SCNC: 23 MMOL/L (ref 23–29)
CREAT SERPL-MCNC: 0.6 MG/DL (ref 0.5–1.4)
DIFFERENTIAL METHOD: ABNORMAL
EOSINOPHIL # BLD AUTO: 1.1 K/UL (ref 0–0.5)
EOSINOPHIL NFR BLD: 10.6 % (ref 0–8)
ERYTHROCYTE [DISTWIDTH] IN BLOOD BY AUTOMATED COUNT: 13.2 % (ref 11.5–14.5)
EST. GFR  (NO RACE VARIABLE): >60 ML/MIN/1.73 M^2
GLUCOSE SERPL-MCNC: 74 MG/DL (ref 70–110)
HCT VFR BLD AUTO: 38.5 % (ref 37–48.5)
HGB BLD-MCNC: 13.2 G/DL (ref 12–16)
IMM GRANULOCYTES # BLD AUTO: 0.18 K/UL (ref 0–0.04)
IMM GRANULOCYTES NFR BLD AUTO: 1.8 % (ref 0–0.5)
LACTATE SERPL-SCNC: 1.2 MMOL/L (ref 0.5–1.9)
LYMPHOCYTES # BLD AUTO: 3.1 K/UL (ref 1–4.8)
LYMPHOCYTES NFR BLD: 31.1 % (ref 18–48)
MAGNESIUM SERPL-MCNC: 1.2 MG/DL (ref 1.6–2.6)
MAGNESIUM SERPL-MCNC: 1.4 MG/DL (ref 1.6–2.6)
MCH RBC QN AUTO: 30.1 PG (ref 27–31)
MCHC RBC AUTO-ENTMCNC: 34.3 G/DL (ref 32–36)
MCV RBC AUTO: 88 FL (ref 82–98)
MONOCYTES # BLD AUTO: 0.8 K/UL (ref 0.3–1)
MONOCYTES NFR BLD: 7.6 % (ref 4–15)
NEUTROPHILS # BLD AUTO: 4.8 K/UL (ref 1.8–7.7)
NEUTROPHILS NFR BLD: 48.3 % (ref 38–73)
NRBC BLD-RTO: 0 /100 WBC
OB PNL STL: POSITIVE
PHOSPHATE SERPL-MCNC: 2.8 MG/DL (ref 2.7–4.5)
PLATELET # BLD AUTO: 331 K/UL (ref 150–450)
PLATELET BLD QL SMEAR: ABNORMAL
PMV BLD AUTO: 8.9 FL (ref 9.2–12.9)
POTASSIUM SERPL-SCNC: 2.8 MMOL/L (ref 3.5–5.1)
POTASSIUM SERPL-SCNC: 2.9 MMOL/L (ref 3.5–5.1)
RBC # BLD AUTO: 4.39 M/UL (ref 4–5.4)
SODIUM SERPL-SCNC: 137 MMOL/L (ref 136–145)
WBC # BLD AUTO: 9.97 K/UL (ref 3.9–12.7)
WBC #/AREA STL HPF: NORMAL /[HPF]

## 2022-10-03 PROCEDURE — 84100 ASSAY OF PHOSPHORUS: CPT | Performed by: INTERNAL MEDICINE

## 2022-10-03 PROCEDURE — 87177 OVA AND PARASITES SMEARS: CPT | Performed by: INTERNAL MEDICINE

## 2022-10-03 PROCEDURE — 63600175 PHARM REV CODE 636 W HCPCS: Performed by: INTERNAL MEDICINE

## 2022-10-03 PROCEDURE — 87209 SMEAR COMPLEX STAIN: CPT

## 2022-10-03 PROCEDURE — 87046 STOOL CULTR AEROBIC BACT EA: CPT | Performed by: INTERNAL MEDICINE

## 2022-10-03 PROCEDURE — 63600175 PHARM REV CODE 636 W HCPCS: Performed by: HOSPITALIST

## 2022-10-03 PROCEDURE — 83735 ASSAY OF MAGNESIUM: CPT | Mod: 91 | Performed by: HOSPITALIST

## 2022-10-03 PROCEDURE — 87209 SMEAR COMPLEX STAIN: CPT | Performed by: INTERNAL MEDICINE

## 2022-10-03 PROCEDURE — 85025 COMPLETE CBC W/AUTO DIFF WBC: CPT | Performed by: INTERNAL MEDICINE

## 2022-10-03 PROCEDURE — 99900035 HC TECH TIME PER 15 MIN (STAT)

## 2022-10-03 PROCEDURE — 36415 COLL VENOUS BLD VENIPUNCTURE: CPT | Performed by: INTERNAL MEDICINE

## 2022-10-03 PROCEDURE — 87449 NOS EACH ORGANISM AG IA: CPT | Performed by: INTERNAL MEDICINE

## 2022-10-03 PROCEDURE — 84132 ASSAY OF SERUM POTASSIUM: CPT | Performed by: HOSPITALIST

## 2022-10-03 PROCEDURE — 83735 ASSAY OF MAGNESIUM: CPT | Performed by: INTERNAL MEDICINE

## 2022-10-03 PROCEDURE — 82272 OCCULT BLD FECES 1-3 TESTS: CPT | Performed by: INTERNAL MEDICINE

## 2022-10-03 PROCEDURE — 36415 COLL VENOUS BLD VENIPUNCTURE: CPT | Performed by: HOSPITALIST

## 2022-10-03 PROCEDURE — 25000003 PHARM REV CODE 250: Performed by: INTERNAL MEDICINE

## 2022-10-03 PROCEDURE — 89055 LEUKOCYTE ASSESSMENT FECAL: CPT | Performed by: INTERNAL MEDICINE

## 2022-10-03 PROCEDURE — 87045 FECES CULTURE AEROBIC BACT: CPT | Performed by: INTERNAL MEDICINE

## 2022-10-03 PROCEDURE — 80048 BASIC METABOLIC PNL TOTAL CA: CPT | Performed by: INTERNAL MEDICINE

## 2022-10-03 PROCEDURE — 83605 ASSAY OF LACTIC ACID: CPT | Performed by: INTERNAL MEDICINE

## 2022-10-03 PROCEDURE — 21000000 HC CCU ICU ROOM CHARGE

## 2022-10-03 PROCEDURE — 30000890 HC MISC. SEND OUT TEST

## 2022-10-03 PROCEDURE — 25000003 PHARM REV CODE 250: Performed by: HOSPITALIST

## 2022-10-03 PROCEDURE — S0030 INJECTION, METRONIDAZOLE: HCPCS | Performed by: INTERNAL MEDICINE

## 2022-10-03 PROCEDURE — 99900031 HC PATIENT EDUCATION (STAT)

## 2022-10-03 PROCEDURE — 94761 N-INVAS EAR/PLS OXIMETRY MLT: CPT

## 2022-10-03 PROCEDURE — 30000890 LABCORP MISCELLANEOUS TEST: Performed by: INTERNAL MEDICINE

## 2022-10-03 RX ORDER — METRONIDAZOLE 500 MG/100ML
500 INJECTION, SOLUTION INTRAVENOUS
Status: DISCONTINUED | OUTPATIENT
Start: 2022-10-03 | End: 2022-10-04 | Stop reason: HOSPADM

## 2022-10-03 RX ORDER — DIAZEPAM 5 MG/1
10 TABLET ORAL 2 TIMES DAILY
Status: DISCONTINUED | OUTPATIENT
Start: 2022-10-03 | End: 2022-10-03

## 2022-10-03 RX ORDER — ALPRAZOLAM 0.25 MG/1
0.25 TABLET ORAL 3 TIMES DAILY PRN
Status: DISCONTINUED | OUTPATIENT
Start: 2022-10-03 | End: 2022-10-04 | Stop reason: HOSPADM

## 2022-10-03 RX ORDER — ACETAMINOPHEN 325 MG/1
650 TABLET ORAL EVERY 8 HOURS PRN
Status: DISCONTINUED | OUTPATIENT
Start: 2022-10-03 | End: 2022-10-04 | Stop reason: HOSPADM

## 2022-10-03 RX ORDER — PANTOPRAZOLE SODIUM 40 MG/1
40 TABLET, DELAYED RELEASE ORAL
Status: DISCONTINUED | OUTPATIENT
Start: 2022-10-03 | End: 2022-10-04 | Stop reason: HOSPADM

## 2022-10-03 RX ORDER — TALC
6 POWDER (GRAM) TOPICAL NIGHTLY PRN
Status: DISCONTINUED | OUTPATIENT
Start: 2022-10-03 | End: 2022-10-04 | Stop reason: HOSPADM

## 2022-10-03 RX ORDER — CHLORPROMAZINE HYDROCHLORIDE 25 MG/1
50 TABLET, FILM COATED ORAL 3 TIMES DAILY
Status: DISCONTINUED | OUTPATIENT
Start: 2022-10-03 | End: 2022-10-04 | Stop reason: HOSPADM

## 2022-10-03 RX ORDER — SODIUM CHLORIDE, SODIUM LACTATE, POTASSIUM CHLORIDE, CALCIUM CHLORIDE 600; 310; 30; 20 MG/100ML; MG/100ML; MG/100ML; MG/100ML
INJECTION, SOLUTION INTRAVENOUS CONTINUOUS
Status: DISCONTINUED | OUTPATIENT
Start: 2022-10-03 | End: 2022-10-03

## 2022-10-03 RX ORDER — POTASSIUM CHLORIDE 7.45 MG/ML
20 INJECTION INTRAVENOUS ONCE
Status: COMPLETED | OUTPATIENT
Start: 2022-10-03 | End: 2022-10-03

## 2022-10-03 RX ORDER — DEXTROSE MONOHYDRATE, SODIUM CHLORIDE, AND POTASSIUM CHLORIDE 50; 2.98; 4.5 G/1000ML; G/1000ML; G/1000ML
INJECTION, SOLUTION INTRAVENOUS CONTINUOUS
Status: DISCONTINUED | OUTPATIENT
Start: 2022-10-03 | End: 2022-10-04 | Stop reason: HOSPADM

## 2022-10-03 RX ORDER — LEVOFLOXACIN 5 MG/ML
750 INJECTION, SOLUTION INTRAVENOUS
Status: DISCONTINUED | OUTPATIENT
Start: 2022-10-03 | End: 2022-10-04 | Stop reason: HOSPADM

## 2022-10-03 RX ORDER — ONDANSETRON 2 MG/ML
4 INJECTION INTRAMUSCULAR; INTRAVENOUS EVERY 8 HOURS PRN
Status: DISCONTINUED | OUTPATIENT
Start: 2022-10-03 | End: 2022-10-04 | Stop reason: HOSPADM

## 2022-10-03 RX ORDER — TRAMADOL HYDROCHLORIDE 50 MG/1
50 TABLET ORAL EVERY 6 HOURS PRN
Status: DISCONTINUED | OUTPATIENT
Start: 2022-10-03 | End: 2022-10-04 | Stop reason: HOSPADM

## 2022-10-03 RX ORDER — BUSPIRONE HYDROCHLORIDE 5 MG/1
10 TABLET ORAL 2 TIMES DAILY
Status: DISCONTINUED | OUTPATIENT
Start: 2022-10-03 | End: 2022-10-04 | Stop reason: HOSPADM

## 2022-10-03 RX ADMIN — POTASSIUM CHLORIDE 20 MEQ: 7.46 INJECTION, SOLUTION INTRAVENOUS at 06:10

## 2022-10-03 RX ADMIN — METRONIDAZOLE 500 MG: 5 INJECTION, SOLUTION INTRAVENOUS at 11:10

## 2022-10-03 RX ADMIN — ONDANSETRON 4 MG: 2 INJECTION INTRAMUSCULAR; INTRAVENOUS at 09:10

## 2022-10-03 RX ADMIN — LEVOFLOXACIN 750 MG: 5 INJECTION, SOLUTION INTRAVENOUS at 08:10

## 2022-10-03 RX ADMIN — BUSPIRONE HYDROCHLORIDE 10 MG: 5 TABLET ORAL at 09:10

## 2022-10-03 RX ADMIN — TRAMADOL HYDROCHLORIDE 50 MG: 50 TABLET, COATED ORAL at 08:10

## 2022-10-03 RX ADMIN — CHLORPROMAZINE HYDROCHLORIDE 50 MG: 25 TABLET, FILM COATED ORAL at 08:10

## 2022-10-03 RX ADMIN — BUSPIRONE HYDROCHLORIDE 10 MG: 5 TABLET ORAL at 08:10

## 2022-10-03 RX ADMIN — ONDANSETRON 4 MG: 2 INJECTION INTRAMUSCULAR; INTRAVENOUS at 08:10

## 2022-10-03 RX ADMIN — DEXTROSE, SODIUM CHLORIDE, AND POTASSIUM CHLORIDE 100 ML/HR: 5; .45; .3 INJECTION INTRAVENOUS at 12:10

## 2022-10-03 RX ADMIN — SODIUM CHLORIDE, SODIUM LACTATE, POTASSIUM CHLORIDE, AND CALCIUM CHLORIDE: .6; .31; .03; .02 INJECTION, SOLUTION INTRAVENOUS at 12:10

## 2022-10-03 RX ADMIN — POTASSIUM BICARBONATE 60 MEQ: 391 TABLET, EFFERVESCENT ORAL at 10:10

## 2022-10-03 RX ADMIN — Medication 800 MG: at 03:10

## 2022-10-03 RX ADMIN — METRONIDAZOLE 500 MG: 5 INJECTION, SOLUTION INTRAVENOUS at 05:10

## 2022-10-03 RX ADMIN — CHLORPROMAZINE HYDROCHLORIDE 50 MG: 25 TABLET, FILM COATED ORAL at 09:10

## 2022-10-03 RX ADMIN — ONDANSETRON 4 MG: 2 INJECTION INTRAMUSCULAR; INTRAVENOUS at 01:10

## 2022-10-03 RX ADMIN — ALPRAZOLAM 0.25 MG: 0.25 TABLET ORAL at 08:10

## 2022-10-03 RX ADMIN — METRONIDAZOLE 500 MG: 5 INJECTION, SOLUTION INTRAVENOUS at 01:10

## 2022-10-03 RX ADMIN — Medication 800 MG: at 10:10

## 2022-10-03 RX ADMIN — PANTOPRAZOLE SODIUM 40 MG: 40 TABLET, DELAYED RELEASE ORAL at 05:10

## 2022-10-03 RX ADMIN — CHLORPROMAZINE HYDROCHLORIDE 50 MG: 25 TABLET, FILM COATED ORAL at 03:10

## 2022-10-03 RX ADMIN — TRAMADOL HYDROCHLORIDE 50 MG: 50 TABLET, COATED ORAL at 09:10

## 2022-10-03 NOTE — H&P
[facility] Medicine  History & Physical    Patient Name: Rosalina Jacobo  MRN: [id number]  Patient Class: IP- Inpatient  Admission Date: 10/2/2022  Attending Physician: Ron Renee MD  Primary Care Provider: Alberto Fairchild MD         Patient information was obtained from patient, past medical records, ER records and ED MD.     Subjective:     Principal Problem:Ileus    Chief Complaint:   Chief Complaint   Patient presents with    Abdominal Pain     Pt presents to ER with c/o RUQ abdominal pain along with N/V/D since September per pt.         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 The pain waxes and wanes of own accord, but is worsened by eating/drinking. She was seen here and admitted 09/29 but left the ED after being admitted (AMA) because she wanted more pain medicine than was being given to her. She returned when her discomfort persisted. She was hypotensive (90's) and tachycardic (120's) coming through the door. This has improved after bolus of three liters. She has not been given narcotics because of the hypotension--she has been given Toradol, which has held her discomfort. She has had nausea without vomiting. She has had loose stools since leaving AM: brown watery; no blood or black--C dif toxin and isolation sent and started respectively. No CP/SOB. Patient stated she will not leave AMA this visit.     In ED: CT Abdo/Pelvis: early ileus and cystitis. CXR reviewed: NAPD. EKG reviewed: sinus with flattened T waves laterally, but no acute segments. Labs reivewed and noted below: leukocytosis with normal H/H; trivial hyponatremia;. hypokalemia (treated in ED); mildly elevated lactate. COVID negative. Urine dirty and reflexed. Blood cultured.    Discussed with ED MD: Inpatient admission for ileus and possible early sepsis; volume  replaced in ED and  ml/hr continued; broad spectrum antibiotics until cultures are specified and sensitivities known; non-narcotic analgesia. 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    Endometriosis     Headache     Heartburn     Hypotension     Infertility, female     Insomnia     Migraine headache     PTSD (post-traumatic stress disorder)     Trauma        Past Surgical History:   Procedure Laterality Date    APPENDECTOMY      BLADDER SUSPENSION      CERVICAL FUSION       SECTION      CHOLECYSTECTOMY      CYSTOSCOPY WITH BIOPSY OF BLADDER N/A 2018    Procedure: CYSTOSCOPY, WITH BLADDER BIOPSY, WITH FULGURATION IF INDICATED;  Surgeon: Luna Slater MD;  Location: [facility] OR;  Service: Urology;  Laterality: N/A;    CYSTOSCOPY WITH HYDRODISTENSION OF BLADDER N/A 12/3/2019    Procedure: CYSTOSCOPY, WITH BLADDER HYDRODISTENSION;  Surgeon: Gumaro Mcgovern MD;  Location: [facility] OR;  Service: OB/GYN;  Laterality: N/A;    DILATION OF URETHRA N/A 2018    Procedure: DILATION, URETHRA;  Surgeon: Luna Slater MD;  Location: [facility] OR;  Service: Urology;  Laterality: N/A;    ESOPHAGOGASTRODUODENOSCOPY N/A 2022    Procedure: EGD (ESOPHAGOGASTRODUODENOSCOPY);  Surgeon: Satinder Rdz MD;  Location: [facility];  Service: Endoscopy;  Laterality: N/A;    HYSTERECTOMY      KNEE SURGERY Bilateral     TIBIA FRACTURE SURGERY Right     tibia/ fibula repaired with pins and screws    TOE SURGERY Left        Review of patient's allergies indicates:   Allergen Reactions    Quetiapine Other (See Comments)     Dystonic reaction  Other reaction(s): Unknown    Aspirin      Chlorpromazine      Other reaction(s): Unknown    Gabapentin      Other reaction(s): Unknown    Haloperidol lactate      Other reaction(s): Unknown    Seroquel [quetiapine]     Pcn [penicillins] Rash       Current Facility-Administered Medications on File Prior to Encounter   Medication    sodium chloride 0.9% flush 3 mL     Current Outpatient Medications on File Prior to Encounter   Medication Sig    busPIRone (BUSPAR) 10 MG tablet Take 10 mg by mouth 2 (two) times daily.    chlorproMAZINE (THORAZINE) 50 MG tablet Take 50 mg by mouth 3 (three) times daily.    diazePAM (VALIUM) 10 MG Tab Take 1 tablet twice a day    ALPRAZolam (XANAX) 0.25 MG tablet Take 2 tablets (0.5 mg total) by mouth 3 (three) times daily as needed for Anxiety.    pantoprazole (PROTONIX) 40 MG tablet Take 1 tablet (40 mg total) by mouth once daily. for 30 doses     Family History       Problem Relation (Age of Onset)    Breast cancer Mother, Maternal Grandmother    Hypertension Father, Mother    Stroke Mother          Tobacco Use    Smoking status: Every Day     Packs/day: 1.00     Types: Cigarettes, Vaping with nicotine    Smokeless tobacco: Never   Substance and Sexual Activity    Alcohol use: Yes     Comment: occ    Drug use: Yes     Types: Marijuana     Comment: last dose today.    Sexual activity: Yes     Partners: Male     Birth control/protection: See Surgical Hx     Review of Systems   Constitutional: Negative.    HENT: Negative.     Eyes: Negative.    Respiratory: Negative.     Cardiovascular: Negative.    Gastrointestinal:  Positive for abdominal distention, abdominal pain, diarrhea and nausea.   Endocrine: Negative.    Genitourinary: Negative.    Musculoskeletal: Negative.    Skin: Negative.    Allergic/Immunologic: Negative.    Neurological: Negative.    Hematological: Negative.    All other systems reviewed and are negative.  Objective:     Vital Signs (Most Recent):  Temp: 98 °F (36.7 °C) (10/02/22 1848)  Pulse:  91 (10/02/22 2328)  Resp: 20 (10/02/22 2258)  BP: 106/67 (10/02/22 2328)  SpO2: 100 % (10/02/22 2328) Vital Signs (24h Range):  Temp:  [98 °F (36.7 °C)] 98 °F (36.7 °C)  Pulse:  [] 91  Resp:  [18-20] 20  SpO2:  [97 %-100 %] 100 %  BP: ()/(52-89) 106/67     Weight: 75.8 kg (167 lb)  Body mass index is 26.95 kg/m².    Physical Exam  Vitals and nursing note reviewed.   Constitutional:       Appearance: She is well-developed. She is obese.   HENT:      Head: Normocephalic and atraumatic.      Right Ear: External ear normal.      Left Ear: External ear normal.      Nose: Nose normal.   Eyes:      Conjunctiva/sclera: Conjunctivae normal.      Pupils: Pupils are equal, round, and reactive to light.   Cardiovascular:      Rate and Rhythm: Normal rate and regular rhythm.      Heart sounds: Normal heart sounds.   Pulmonary:      Effort: Pulmonary effort is normal.      Breath sounds: Normal breath sounds.   Abdominal:      General: Bowel sounds are normal.      Palpations: Abdomen is soft.      Comments: Soft with voluntary guarding and tenderness to palpation; BS+   Musculoskeletal:         General: Normal range of motion.      Cervical back: Normal range of motion and neck supple.   Skin:     General: Skin is warm and dry.      Capillary Refill: Capillary refill takes less than 2 seconds.   Neurological:      Mental Status: She is alert and oriented to person, place, and time.   Psychiatric:         Behavior: Behavior normal.         Thought Content: Thought content normal.         Judgment: Judgment normal.         CRANIAL NERVES     CN III, IV, VI   Pupils are equal, round, and reactive to light.     Significant Labs: All pertinent labs within the past 24 hours have been reviewed.  CBC:   Recent Labs   Lab 10/02/22  1908   WBC 13.29*   HGB 16.1*   HCT 46.7   *     CMP:   Recent Labs   Lab 10/02/22  1908   *   K 2.7*      CO2 23   *   BUN 14   CREATININE 1.1   CALCIUM 7.9*   PROT 5.5*    ALBUMIN 2.7*   BILITOT 0.4   ALKPHOS 99   AST 18   ALT 24   ANIONGAP 10     Lactic Acid:   Recent Labs   Lab 10/02/22  1935   LACTATE 2.3*       Significant Imaging: I have reviewed all pertinent imaging results/findings within the past 24 hours.    Assessment/Plan:     * Ileus  Patient has Non- operative ileus which is adynamic in etiology which is stable. This is inherent to her procedure. Will treat conservatively with bowel rest, IV fluids, serial abdominal exams and avoidance of GI paralytics such as narcotics or anti-spasmodics. Monitor patient closely.     Inpatient admission for ileus and possible early sepsis; volume replaced in ED and  ml/hr continued; broad spectrum antibiotics until cultures are specified and sensitivities known; non-narcotic analgesia. AM labs for review; NPO except ice chips and sips with meds      Severe sepsis  Inpatient admission for ileus and possible early sepsis; volume replaced in ED and  ml/hr continued; broad spectrum antibiotics until cultures are specified and sensitivities known; non-narcotic analgesia. AM labs for review; NPO except ice chips and sips with meds. Pressors not needed      Cystitis  Inpatient admission for ileus and possible early sepsis; volume replaced in ED and  ml/hr continued; broad spectrum antibiotics until cultures are specified and sensitivities known; non-narcotic analgesia. AM labs for review; NPO except ice chips and sips with meds        VTE Risk Mitigation (From admission, onward)    None             Ron Renee MD  Department of Hospital Medicine   [facility]

## 2022-10-03 NOTE — ED PROVIDER NOTES
Encounter Date: 10/2/2022       History     Chief Complaint   Patient presents with    Abdominal Pain     Pt presents to ER with c/o RUQ abdominal pain along with N/V/D since September per pt.      47-year-old female presents complaining of abdominal pain diarrhea nausea and vomiting patient reports that she was diagnosed with an ileus on  and was admitted to the hospital patient reports that she stayed a few hours and then signed out of the hospital against medical advice.  Patient is back now continuing to complain of abdominal pain she reports that she did not feel any better after she left and she is here with continuing symptoms patient denies fever cough shortness of breath or chest pain patient has no other acute complaints at this time.    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    Endometriosis     Headache     Heartburn     Hypotension     Infertility, female     Insomnia     Migraine headache     PTSD (post-traumatic stress disorder)     Trauma      Past Surgical History:   Procedure Laterality Date    APPENDECTOMY      BLADDER SUSPENSION      CERVICAL FUSION       SECTION      CHOLECYSTECTOMY      CYSTOSCOPY WITH BIOPSY OF BLADDER N/A 2018    Procedure: CYSTOSCOPY, WITH BLADDER BIOPSY, WITH FULGURATION IF INDICATED;  Surgeon: Luna Slater MD;  Location: [facility];  Service: Urology;  Laterality: N/A;    CYSTOSCOPY WITH HYDRODISTENSION OF BLADDER  N/A 12/3/2019    Procedure: CYSTOSCOPY, WITH BLADDER HYDRODISTENSION;  Surgeon: Gumaro Mcgovern MD;  Location: [facility] OR;  Service: OB/GYN;  Laterality: N/A;    DILATION OF URETHRA N/A 12/12/2018    Procedure: DILATION, URETHRA;  Surgeon: Luna Slater MD;  Location: [facility] OR;  Service: Urology;  Laterality: N/A;    ESOPHAGOGASTRODUODENOSCOPY N/A 1/21/2022    Procedure: EGD (ESOPHAGOGASTRODUODENOSCOPY);  Surgeon: Satinder Rdz MD;  Location: [facility] ENDO;  Service: Endoscopy;  Laterality: N/A;    HYSTERECTOMY      KNEE SURGERY Bilateral     TIBIA FRACTURE SURGERY Right     tibia/ fibula repaired with pins and screws    TOE SURGERY Left      Family History   Problem Relation Age of Onset    Hypertension Father     Hypertension Mother     Stroke Mother     Breast cancer Mother     Breast cancer Maternal Grandmother      Social History     Tobacco Use    Smoking status: Every Day     Packs/day: 1.00     Types: Cigarettes, Vaping with nicotine    Smokeless tobacco: Never   Substance Use Topics    Alcohol use: Yes     Comment: occ    Drug use: Yes     Types: Marijuana     Comment: last dose today.     Review of Systems   Constitutional:  Negative for fever.   HENT:  Negative for congestion, rhinorrhea, sore throat and trouble swallowing.    Eyes:  Negative for visual disturbance.   Respiratory:  Negative for cough, chest tightness, shortness of breath and wheezing.    Cardiovascular:  Negative for chest pain, palpitations and leg swelling.   Gastrointestinal:  Positive for abdominal pain, diarrhea, nausea and vomiting. Negative for abdominal distention and constipation.   Genitourinary:  Negative for difficulty urinating, dysuria, flank pain and frequency.   Musculoskeletal:  Negative for arthralgias, back pain, joint swelling and neck pain.   Skin:  Negative for color change and rash.   Neurological:  Negative for dizziness, syncope, speech difficulty, weakness, numbness and headaches.   All other systems  reviewed and are negative.    Physical Exam     Initial Vitals [10/02/22 1848]   BP Pulse Resp Temp SpO2   114/89 (!) 118 20 98 °F (36.7 °C) 99 %      MAP       --         Physical Exam    Nursing note and vitals reviewed.  Constitutional: She appears well-developed and well-nourished. She is not diaphoretic. No distress.   HENT:   Head: Normocephalic and atraumatic.   Right Ear: External ear normal.   Left Ear: External ear normal.   Nose: Nose normal.   Mouth/Throat: Oropharynx is clear and moist. No oropharyngeal exudate.   Eyes: Conjunctivae and EOM are normal. Pupils are equal, round, and reactive to light. Right eye exhibits no discharge. Left eye exhibits no discharge. No scleral icterus.   Neck: Neck supple. No thyromegaly present. No tracheal deviation present. No JVD present.   Normal range of motion.  Cardiovascular:  Normal rate, regular rhythm, normal heart sounds and intact distal pulses.     Exam reveals no gallop and no friction rub.       No murmur heard.  Pulmonary/Chest: Breath sounds normal. No stridor. No respiratory distress. She has no wheezes. She has no rhonchi. She has no rales. She exhibits no tenderness.   Abdominal: Abdomen is soft. Bowel sounds are normal. She exhibits no distension and no mass. There is abdominal tenderness.   Patient has tenderness to palpation in the right lower quadrant no rebound or guarding noted There is no rebound and no guarding.   Musculoskeletal:         General: No tenderness or edema. Normal range of motion.      Cervical back: Normal range of motion and neck supple.     Lymphadenopathy:     She has no cervical adenopathy.   Neurological: She is alert and oriented to person, place, and time. She has normal strength. No cranial nerve deficit or sensory deficit.   Skin: Skin is warm and dry. No rash and no abscess noted. No erythema. No pallor.       ED Course   Procedures  Labs Reviewed   CBC W/ AUTO DIFFERENTIAL - Abnormal; Notable for the following  components:       Result Value    WBC 13.29 (*)     RBC 5.45 (*)     Hemoglobin 16.1 (*)     Platelets 496 (*)     MPV 8.5 (*)     Immature Granulocytes 3.2 (*)     Immature Grans (Abs) 0.43 (*)     Eos # 1.0 (*)     All other components within normal limits   COMPREHENSIVE METABOLIC PANEL - Abnormal; Notable for the following components:    Sodium 133 (*)     Potassium 2.7 (*)     Glucose 128 (*)     Calcium 7.9 (*)     Total Protein 5.5 (*)     Albumin 2.7 (*)     All other components within normal limits    Narrative:     POTASSIUM    critical result(s) called and verbal readback obtained   from  ANIA KENYON RN ER. by TC1 10/02/2022 19:38   URINALYSIS, REFLEX TO URINE CULTURE - Abnormal; Notable for the following components:    Appearance, UA Cloudy (*)     Protein, UA Trace (*)     Leukocytes, UA 2+ (*)     All other components within normal limits    Narrative:     Specimen Source->Urine   LACTIC ACID, PLASMA - Abnormal; Notable for the following components:    Lactate (Lactic Acid) 2.3 (*)     All other components within normal limits    Narrative:     LACTIC ACID   critical result(s) called and verbal readback obtained   from   SABA YEAGER RN ER. by TC1 10/02/2022 20:21   URINALYSIS MICROSCOPIC - Abnormal; Notable for the following components:    WBC, UA 42 (*)     Bacteria Few (*)     Hyaline Casts, UA 84 (*)     All other components within normal limits    Narrative:     Specimen Source->Urine   CULTURE, STOOL   CLOSTRIDIUM DIFFICILE   CULTURE, URINE   CULTURE, BLOOD   CULTURE, BLOOD   LIPASE   SARS-COV-2 RNA AMPLIFICATION, QUAL   STOOL EXAM-OVA,CYSTS,PARASITES   WBC, STOOL   OCCULT BLOOD X 1, STOOL          Imaging Results              CT Abdomen Pelvis With Contrast (Final result)  Result time 10/02/22 22:28:21      Final result by Danay Lima DO (10/02/22 22:28:21)                   Narrative:    EXAM:  CT Abdomen and Pelvis With Intravenous Contrast    FACILITY:  [facility]  Hospital    REFERRING:  AAAREFERRAL SELF    CLINICAL HISTORY:  47 years, Female; Bowel obstruction suspected nausea vomiting diarrhea .7    TECHNIQUE:  Axial computed tomography images of the abdomen and pelvis with intravenous contrast.  Sagittal and coronal reformatted images were created and reviewed.  This CT exam was performed using one or more of the following dose reduction techniques:  automated exposure control, adjustment of the mA and/or kV according to patient size, and/or use of iterative reconstruction technique.    CONTRAST:  100 mL    COMPARISON:  September 28, 2022    FINDINGS:  Lung bases:  No acute pathology.  No mass.  No consolidation.    ABDOMEN:  Liver:  No acute pathology.  No mass.  Gallbladder and bile ducts:  Cholecystectomy.  No ductal dilation.  Pancreas:  No acute pathology.  No mass.  No ductal dilation.  Spleen:  No acute pathology.  No splenomegaly.  Adrenals:  No acute pathology.  No mass.  Kidneys and ureters:  Right renal scarring similar to prior study. No hydronephrosis or hydroureter.  Stomach and bowel:  Fluid-filled loops of small bowel with scattered air-fluid levels in borderline wall thickening. Small bowel measures up to 2.8 cm within the right abdomen. The terminal small bowel is decompressed. No definite transition point. G Findings can be seen with enteritis with ileus. No high-grade bowel obstruction is noted. I cannot exclude a developing or low-grade obstruction. Close clinical follow-up is recommended.  The stomach is unremarkable.    PELVIS:  Appendix:  No evidence of acute appendicitis.  Bladder:  There is wall thickening within the bladder. Correlate with urinalysis for cystitis.  Reproductive:  Hysterectomy.    ABDOMEN and PELVIS:  Intraperitoneal space:  No free air.  No free fluid.  Bones/joints:  There is a compression deformity at L1 unchanged from prior study. Right-sided spondylolysis at L5 unchanged from prior study No acute osseous  abnormality.  No dislocation.  Soft tissues:  No acute pathology.  Vasculature:  No aneurysm or dissection.  Lymph nodes:  No acute pathology.  No enlarged lymph nodes.    IMPRESSION:  1.  Fluid-filled loops of small bowel with scattered air-fluid levels in borderline wall thickening. Small bowel measures up to 2.8 cm within the right abdomen. The terminal small bowel is decompressed. No definite transition point. G Findings can be seen with enteritis with ileus. No high-grade bowel obstruction is noted. I cannot exclude a developing or low-grade obstruction. Close clinical follow-up is recommended.  2.  There is wall thickening within the bladder. Correlate with urinalysis for cystitis.  3.  Hysterectomy.  4. Cholecystectomy.    Electronically signed by:  Danay Lima DO  10/2/2022 10:28 PM CDT Workstation: [id number]                                     X-Ray Chest AP Portable (In process)  Result time 10/02/22 20:32:40                     Medications   busPIRone tablet 10 mg (has no administration in time range)   chlorproMAZINE tablet 50 mg (has no administration in time range)   diazePAM tablet 10 mg (10 mg Oral Given 10/3/22 0101)   pantoprazole EC tablet 40 mg (has no administration in time range)   melatonin tablet 6 mg (has no administration in time range)   acetaminophen tablet 650 mg (has no administration in time range)   traMADoL tablet 50 mg (has no administration in time range)   ondansetron injection 4 mg (4 mg Intravenous Given 10/3/22 0100)   lactated ringers infusion ( Intravenous New Bag 10/3/22 0042)   potassium bicarbonate disintegrating tablet 50 mEq (has no administration in time range)   potassium bicarbonate disintegrating tablet 35 mEq (has no administration in time range)   potassium bicarbonate disintegrating tablet 60 mEq (has no administration in time range)   magnesium oxide tablet 800 mg (has no administration in time range)   magnesium oxide tablet 800 mg (has no  administration in time range)   potassium, sodium phosphates 280-160-250 mg packet 2 packet (has no administration in time range)   potassium, sodium phosphates 280-160-250 mg packet 2 packet (has no administration in time range)   potassium, sodium phosphates 280-160-250 mg packet 2 packet (has no administration in time range)   potassium bicarbonate disintegrating tablet 50 mEq (has no administration in time range)   potassium bicarbonate disintegrating tablet 35 mEq (has no administration in time range)   potassium bicarbonate disintegrating tablet 60 mEq (has no administration in time range)   magnesium oxide tablet 800 mg (has no administration in time range)   magnesium oxide tablet 800 mg (has no administration in time range)   potassium, sodium phosphates 280-160-250 mg packet 2 packet (has no administration in time range)   potassium, sodium phosphates 280-160-250 mg packet 2 packet (has no administration in time range)   potassium, sodium phosphates 280-160-250 mg packet 2 packet (has no administration in time range)   metronidazole IVPB 500 mg (500 mg Intravenous New Bag 10/3/22 0132)   levoFLOXacin 750 mg/150 mL IVPB 750 mg (has no administration in time range)   sodium chloride 0.9% bolus 1,000 mL (0 mLs Intravenous Stopped 10/2/22 2054)   ondansetron injection 4 mg (4 mg Intravenous Given 10/2/22 1928)   ketorolac injection 15 mg (15 mg Intravenous Given 10/2/22 1928)   potassium chloride 10 mEq in 100 mL IVPB (0 mEq Intravenous Stopped 10/2/22 2054)   diphenhydrAMINE injection 12.5 mg (12.5 mg Intravenous Given 10/2/22 1942)   iohexoL (OMNIPAQUE 350) injection 100 mL (100 mLs Intravenous Given 10/2/22 2042)   sodium chloride 0.9% bolus 1,000 mL (0 mLs Intravenous Stopped 10/2/22 2313)   levoFLOXacin 750 mg/150 mL IVPB 750 mg (0 mg Intravenous Stopped 10/2/22 2313)     Medical Decision Making:   History:   Old Medical Records: I decided to obtain old medical records.  Initial Assessment:   Emergent  evaluation of a 47-year-old female presenting with nausea vomiting and abdominal pain differential diagnosis includes infection obstruction perforation          Attending Attestation:         Attending Critical Care:   Critical Care Times:   Direct Patient Care (initial evaluation, reassessments, and time considering the case)................................................................15 minutes.   Additional History from reviewing old medical records or taking additional history from the family, EMS, PCP, etc.......................5 minutes.   Ordering, Reviewing, and Interpreting Diagnostic Studies...............................................................................................................5 minutes.   Documentation..................................................................................................................................................................................10 minutes.   Consultation with other Physicians. .................................................................................................................................................5 minutes.   ==============================================================  Total Critical Care Time - exclusive of procedural time: 40 minutes.  ==============================================================  Critical care was necessary to treat or prevent imminent or life-threatening deterioration of the following conditions: hypotension.   Critical care was time spent personally by me on the following activities: obtaining history from patient or relative, examination of patient, review of old charts, ordering lab, x-rays, and/or EKG, development of treatment plan with patient or relative, ordering and performing treatments and interventions, evaluation of patient's response to treatment and discussion with consultants.   Critical Care Condition: potentially life-threatening       Attending ED Notes:   Patient  noted to have hypotension, hypokalemia and ileus, patient blood pressure has improved after IV hydration however still remains on the lower side patient consulted Internal Medicine for further evaluation and management                 Clinical Impression:   Final diagnoses:  [E87.6] Hypokalemia  [K56.7] Ileus (Primary)      ED Disposition Condition    Admit Stable                Quirino Brown MD  10/03/22 0222

## 2022-10-03 NOTE — HPI
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 The pain waxes and wanes of own accord, but is worsened by eating/drinking. She was seen here and admitted 09/29 but left the ED after being admitted (AMA) because she wanted more pain medicine than was being given to her. She returned when her discomfort persisted. She was hypotensive (90's) and tachycardic (120's) coming through the door. This has improved after bolus of three liters. She has not been given narcotics because of the hypotension--she has been given Toradol, which has held her discomfort. She has had nausea without vomiting. She has had loose stools since leaving AM: brown watery; no blood or black--C dif toxin and isolation sent and started respectively. No CP/SOB. Patient stated she will not leave AMA this visit.     In ED: CT Abdo/Pelvis: early ileus and cystitis. CXR reviewed: NAPD. EKG reviewed: sinus with flattened T waves laterally, but no acute segments. Labs reivewed and noted below: leukocytosis with normal H/H; trivial hyponatremia;. hypokalemia (treated in ED); mildly elevated lactate. COVID negative. Urine dirty and reflexed. Blood cultured.    Discussed with ED MD: Inpatient admission for ileus and possible early sepsis; volume replaced in ED and  ml/hr continued; broad spectrum antibiotics until cultures are specified and sensitivities known; non-narcotic analgesia. AM labs for review; NPO except ice chips and sips with meds

## 2022-10-03 NOTE — PROGRESS NOTES
[facility] Medicine  Progress Note      Patient was seen and examined bedside, reviewed admitting provider's note and plan. overall presentation is consistent with severe gastroparesis, UTI, polypharmacy.  I do not think patient has sepsis, has good tissue perfusion.     Plan:  I do not think patient has sepsis, has good tissue perfusion  Deescalate antibiotics to Levaquin for UTI.  No need for vancomycin  Check stool studies including C diff  Counseled her that she suffers from gastroparesis and use of opioid is strongly discouraged  PPI  Has critical hypokalemia, hypomagnesemia, aggressive electrolyte replacement  Continue essential home medications  Aggressive electrolyte replacement  Repeat KUB looks better, start liquid diet    Department Hospital Medicine  Critical access hospital  Earnestine Juares MD  Date of service: 10/03/2022

## 2022-10-03 NOTE — SUBJECTIVE & OBJECTIVE
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    Endometriosis     Headache     Heartburn     Hypotension     Infertility, female     Insomnia     Migraine headache     PTSD (post-traumatic stress disorder)     Trauma        Past Surgical History:   Procedure Laterality Date    APPENDECTOMY      BLADDER SUSPENSION      CERVICAL FUSION       SECTION      CHOLECYSTECTOMY      CYSTOSCOPY WITH BIOPSY OF BLADDER N/A 2018    Procedure: CYSTOSCOPY, WITH BLADDER BIOPSY, WITH FULGURATION IF INDICATED;  Surgeon: Luna Slater MD;  Location: [facility] OR;  Service: Urology;  Laterality: N/A;    CYSTOSCOPY WITH HYDRODISTENSION OF BLADDER N/A 12/3/2019    Procedure: CYSTOSCOPY, WITH BLADDER HYDRODISTENSION;  Surgeon: Gumaro Mcgovern MD;  Location: [facility] OR;  Service: OB/GYN;  Laterality: N/A;    DILATION OF URETHRA N/A 2018    Procedure: DILATION, URETHRA;  Surgeon: Luna Slater MD;  Location: [facility] OR;  Service: Urology;  Laterality: N/A;    ESOPHAGOGASTRODUODENOSCOPY N/A 2022    Procedure: EGD (ESOPHAGOGASTRODUODENOSCOPY);  Surgeon: Satinder Rdz MD;  Location: [facility];  Service: Endoscopy;  Laterality: N/A;    HYSTERECTOMY      KNEE SURGERY Bilateral     TIBIA FRACTURE SURGERY Right     tibia/ fibula repaired with pins and screws    TOE SURGERY Left        Review of patient's allergies indicates:   Allergen Reactions    Quetiapine Other (See Comments)     Dystonic reaction  Other reaction(s): Unknown    Aspirin     Chlorpromazine      Other reaction(s): Unknown    Gabapentin      Other reaction(s): Unknown    Haloperidol lactate      Other reaction(s): Unknown    Seroquel [quetiapine]     Pcn [penicillins] Rash       Current Facility-Administered  Medications on File Prior to Encounter   Medication    sodium chloride 0.9% flush 3 mL     Current Outpatient Medications on File Prior to Encounter   Medication Sig    busPIRone (BUSPAR) 10 MG tablet Take 10 mg by mouth 2 (two) times daily.    chlorproMAZINE (THORAZINE) 50 MG tablet Take 50 mg by mouth 3 (three) times daily.    diazePAM (VALIUM) 10 MG Tab Take 1 tablet twice a day    ALPRAZolam (XANAX) 0.25 MG tablet Take 2 tablets (0.5 mg total) by mouth 3 (three) times daily as needed for Anxiety.    pantoprazole (PROTONIX) 40 MG tablet Take 1 tablet (40 mg total) by mouth once daily. for 30 doses     Family History       Problem Relation (Age of Onset)    Breast cancer Mother, Maternal Grandmother    Hypertension Father, Mother    Stroke Mother          Tobacco Use    Smoking status: Every Day     Packs/day: 1.00     Types: Cigarettes, Vaping with nicotine    Smokeless tobacco: Never   Substance and Sexual Activity    Alcohol use: Yes     Comment: occ    Drug use: Yes     Types: Marijuana     Comment: last dose today.    Sexual activity: Yes     Partners: Male     Birth control/protection: See Surgical Hx     Review of Systems   Constitutional: Negative.    HENT: Negative.     Eyes: Negative.    Respiratory: Negative.     Cardiovascular: Negative.    Gastrointestinal:  Positive for abdominal distention, abdominal pain, diarrhea and nausea.   Endocrine: Negative.    Genitourinary: Negative.    Musculoskeletal: Negative.    Skin: Negative.    Allergic/Immunologic: Negative.    Neurological: Negative.    Hematological: Negative.    All other systems reviewed and are negative.  Objective:     Vital Signs (Most Recent):  Temp: 98 °F (36.7 °C) (10/02/22 1848)  Pulse: 91 (10/02/22 2328)  Resp: 20 (10/02/22 2258)  BP: 106/67 (10/02/22 2328)  SpO2: 100 % (10/02/22 2328) Vital Signs (24h Range):  Temp:  [98 °F (36.7 °C)] 98 °F (36.7 °C)  Pulse:  [] 91  Resp:  [18-20] 20  SpO2:  [97 %-100 %] 100 %  BP:  ()/(52-89) 106/67     Weight: 75.8 kg (167 lb)  Body mass index is 26.95 kg/m².    Physical Exam  Vitals and nursing note reviewed.   Constitutional:       Appearance: She is well-developed. She is obese.   HENT:      Head: Normocephalic and atraumatic.      Right Ear: External ear normal.      Left Ear: External ear normal.      Nose: Nose normal.   Eyes:      Conjunctiva/sclera: Conjunctivae normal.      Pupils: Pupils are equal, round, and reactive to light.   Cardiovascular:      Rate and Rhythm: Normal rate and regular rhythm.      Heart sounds: Normal heart sounds.   Pulmonary:      Effort: Pulmonary effort is normal.      Breath sounds: Normal breath sounds.   Abdominal:      General: Bowel sounds are normal.      Palpations: Abdomen is soft.      Comments: Soft with voluntary guarding and tenderness to palpation; BS+   Musculoskeletal:         General: Normal range of motion.      Cervical back: Normal range of motion and neck supple.   Skin:     General: Skin is warm and dry.      Capillary Refill: Capillary refill takes less than 2 seconds.   Neurological:      Mental Status: She is alert and oriented to person, place, and time.   Psychiatric:         Behavior: Behavior normal.         Thought Content: Thought content normal.         Judgment: Judgment normal.         CRANIAL NERVES     CN III, IV, VI   Pupils are equal, round, and reactive to light.     Significant Labs: All pertinent labs within the past 24 hours have been reviewed.  CBC:   Recent Labs   Lab 10/02/22  1908   WBC 13.29*   HGB 16.1*   HCT 46.7   *     CMP:   Recent Labs   Lab 10/02/22  1908   *   K 2.7*      CO2 23   *   BUN 14   CREATININE 1.1   CALCIUM 7.9*   PROT 5.5*   ALBUMIN 2.7*   BILITOT 0.4   ALKPHOS 99   AST 18   ALT 24   ANIONGAP 10     Lactic Acid:   Recent Labs   Lab 10/02/22  1935   LACTATE 2.3*       Significant Imaging: I have reviewed all pertinent imaging results/findings within the past 24  hours.

## 2022-10-03 NOTE — ASSESSMENT & PLAN NOTE
Patient has Non- operative ileus which is adynamic in etiology which is stable. This is inherent to her procedure. Will treat conservatively with bowel rest, IV fluids, serial abdominal exams and avoidance of GI paralytics such as narcotics or anti-spasmodics. Monitor patient closely.     Inpatient admission for ileus and possible early sepsis; volume replaced in ED and  ml/hr continued; broad spectrum antibiotics until cultures are specified and sensitivities known; non-narcotic analgesia. AM labs for review; NPO except ice chips and sips with meds

## 2022-10-03 NOTE — ASSESSMENT & PLAN NOTE
Inpatient admission for ileus and possible early sepsis; volume replaced in ED and  ml/hr continued; broad spectrum antibiotics until cultures are specified and sensitivities known; non-narcotic analgesia. AM labs for review; NPO except ice chips and sips with meds

## 2022-10-03 NOTE — ASSESSMENT & PLAN NOTE
Inpatient admission for ileus and possible early sepsis; volume replaced in ED and  ml/hr continued; broad spectrum antibiotics until cultures are specified and sensitivities known; non-narcotic analgesia. AM labs for review; NPO except ice chips and sips with meds. Pressors not needed

## 2022-10-03 NOTE — PLAN OF CARE
Due to isolation status for patient, spoke with spouse Judson Jacobo (Spouse) [phone] (Mobile) on phone to complete discharge assessment.     [facility]  Initial Discharge Assessment       Primary Care Provider: Alberto Fairchild MD    Admission Diagnosis: Ileus [K56.7]    Admission Date: 10/2/2022  Expected Discharge Date:     Discharge Barriers Identified: (P) None    Payor: [employer] MANAGED MEDICARE / Plan: [employer] SNP (SPECIAL NEEDS PLAN) / Product Type: Medicare Advantage /     Extended Emergency Contact Information  Primary Emergency Contact: Judson Jacobo  Address: [place] [address] [facility]  Home Phone: [phone]  Mobile Phone: [phone]  Relation: Spouse  Preferred language: English   needed? No    Discharge Plan A: (P) Home, Home with family  Discharge Plan B: (P) Home, Home with family      Local [id number] - [place], [place] - [address] AT [facility] [address]  [address]  [facility] [address]  Phone: [phone] Fax: [phone]    [id number] - [place], [place] - [address] [facility] AT [facility] [address]  [address] [facility] [address]  Phone: [phone] Fax: [phone]    [facility]  [address]  [facility] [address]  Phone: [phone] Fax: [phone]      Initial Assessment (most recent)       Adult Discharge Assessment - 10/03/22 1305          Discharge Assessment    Assessment Type Discharge Planning Assessment (P)      Confirmed/corrected address, phone number and insurance Yes (P)      Confirmed Demographics Correct on Facesheet (P)      Source of Information family (P)      When was your last doctors appointment? -- (P)    unknown    Communicated JUSTICE with patient/caregiver No (P)      Reason For Admission Ileus (P)      Lives With spouse (P)      Facility Arrived From: home (P)      Do you expect to return to  your current living situation? Yes (P)      Do you have help at home or someone to help you manage your care at home? Yes (P)      Who are your caregiver(s) and their phone number(s)? Judson Jacobo (Spouse)   [phone] (Mobile) (P)      Prior to hospitilization cognitive status: Unable to Assess (P)      Current cognitive status: Alert/Oriented (P)      Walking or Climbing Stairs Difficulty none (P)      Dressing/Bathing Difficulty none (P)      Equipment Currently Used at Home none (P)      Readmission within 30 days? No (P)      Patient currently being followed by outpatient case management? No (P)      Do you currently have service(s) that help you manage your care at home? No (P)      Do you take prescription medications? Yes (P)      Do you have prescription coverage? Yes (P)      Coverage [employer] managed medicare (P)      Do you have any problems affording any of your prescribed medications? No (P)      Is the patient taking medications as prescribed? yes (P)      Who is going to help you get home at discharge? Judson Jacobo (Spouse)   [phone] (Mobile) (P)      How do you get to doctors appointments? family or friend will provide;car, drives self (P)      Are you on dialysis? No (P)      Do you take coumadin? No (P)      Discharge Plan A Home;Home with family (P)      Discharge Plan B Home;Home with family (P)      DME Needed Upon Discharge  none (P)      Discharge Plan discussed with: Spouse/sig other (P)      Name(s) and Number(s) OdailsJudson (Spouse)   [phone] (Mobile) (P)      Discharge Barriers Identified None (P)         Relationship/Environment    Name(s) of Who Lives With Patient Judson Jacobo (Spouse)   [phone] (Mobile) (P)

## 2022-10-03 NOTE — PLAN OF CARE
Problem: Adult Inpatient Plan of Care  Goal: Plan of Care Review  Outcome: Ongoing, Progressing  Goal: Patient-Specific Goal (Individualized)  Outcome: Ongoing, Progressing  Goal: Absence of Hospital-Acquired Illness or Injury  Outcome: Ongoing, Progressing  Goal: Optimal Comfort and Wellbeing  Outcome: Ongoing, Progressing  Goal: Readiness for Transition of Care  Outcome: Ongoing, Progressing     Problem: Infection  Goal: Absence of Infection Signs and Symptoms  Outcome: Ongoing, Progressing     Problem: Adjustment to Illness (Sepsis/Septic Shock)  Goal: Optimal Coping  Outcome: Ongoing, Progressing     Problem: Bleeding (Sepsis/Septic Shock)  Goal: Absence of Bleeding  Outcome: Ongoing, Progressing     Problem: Glycemic Control Impaired (Sepsis/Septic Shock)  Goal: Blood Glucose Level Within Desired Range  Outcome: Ongoing, Progressing     Problem: Infection Progression (Sepsis/Septic Shock)  Goal: Absence of Infection Signs and Symptoms  Outcome: Ongoing, Progressing     Problem: Nutrition Impaired (Sepsis/Septic Shock)  Goal: Optimal Nutrition Intake  Outcome: Ongoing, Progressing

## 2022-10-04 VITALS
TEMPERATURE: 98 F | DIASTOLIC BLOOD PRESSURE: 64 MMHG | SYSTOLIC BLOOD PRESSURE: 114 MMHG | HEIGHT: 66 IN | WEIGHT: 177.94 LBS | RESPIRATION RATE: 16 BRPM | OXYGEN SATURATION: 99 % | BODY MASS INDEX: 28.6 KG/M2 | HEART RATE: 84 BPM

## 2022-10-04 PROBLEM — E83.42 HYPOMAGNESEMIA: Status: ACTIVE | Noted: 2022-10-04

## 2022-10-04 PROBLEM — R65.20 SEVERE SEPSIS: Status: RESOLVED | Noted: 2022-10-02 | Resolved: 2022-10-04

## 2022-10-04 PROBLEM — A41.9 SEVERE SEPSIS: Status: RESOLVED | Noted: 2022-10-02 | Resolved: 2022-10-04

## 2022-10-04 PROBLEM — K31.84 GASTROPARESIS: Status: ACTIVE | Noted: 2022-10-04

## 2022-10-04 PROBLEM — E87.6 HYPOKALEMIA: Status: ACTIVE | Noted: 2022-10-04

## 2022-10-04 PROBLEM — K56.7 ILEUS: Status: RESOLVED | Noted: 2022-09-29 | Resolved: 2022-10-04

## 2022-10-04 LAB
ALBUMIN SERPL BCP-MCNC: 2.3 G/DL (ref 3.5–5.2)
ALP SERPL-CCNC: 79 U/L (ref 55–135)
ALT SERPL W/O P-5'-P-CCNC: 17 U/L (ref 10–44)
ANION GAP SERPL CALC-SCNC: 4 MMOL/L (ref 8–16)
AST SERPL-CCNC: 14 U/L (ref 10–40)
BACTERIA UR CULT: NORMAL
BACTERIA UR CULT: NORMAL
BASOPHILS # BLD AUTO: 0.05 K/UL (ref 0–0.2)
BASOPHILS NFR BLD: 0.5 % (ref 0–1.9)
BILIRUB SERPL-MCNC: 0.1 MG/DL (ref 0.1–1)
BUN SERPL-MCNC: <5 MG/DL (ref 6–20)
CALCIUM SERPL-MCNC: 7.7 MG/DL (ref 8.7–10.5)
CHLORIDE SERPL-SCNC: 113 MMOL/L (ref 95–110)
CO2 SERPL-SCNC: 21 MMOL/L (ref 23–29)
CREAT SERPL-MCNC: 0.6 MG/DL (ref 0.5–1.4)
DIFFERENTIAL METHOD: ABNORMAL
EOSINOPHIL # BLD AUTO: 1.5 K/UL (ref 0–0.5)
EOSINOPHIL NFR BLD: 16.4 % (ref 0–8)
ERYTHROCYTE [DISTWIDTH] IN BLOOD BY AUTOMATED COUNT: 13.2 % (ref 11.5–14.5)
EST. GFR  (NO RACE VARIABLE): >60 ML/MIN/1.73 M^2
GLUCOSE SERPL-MCNC: 81 MG/DL (ref 70–110)
HCT VFR BLD AUTO: 35.7 % (ref 37–48.5)
HGB BLD-MCNC: 12 G/DL (ref 12–16)
IMM GRANULOCYTES # BLD AUTO: 0.17 K/UL (ref 0–0.04)
IMM GRANULOCYTES NFR BLD AUTO: 1.8 % (ref 0–0.5)
LYMPHOCYTES # BLD AUTO: 2.9 K/UL (ref 1–4.8)
LYMPHOCYTES NFR BLD: 30.3 % (ref 18–48)
MAGNESIUM SERPL-MCNC: 1.4 MG/DL (ref 1.6–2.6)
MCH RBC QN AUTO: 29.8 PG (ref 27–31)
MCHC RBC AUTO-ENTMCNC: 33.6 G/DL (ref 32–36)
MCV RBC AUTO: 89 FL (ref 82–98)
MONOCYTES # BLD AUTO: 0.7 K/UL (ref 0.3–1)
MONOCYTES NFR BLD: 7.8 % (ref 4–15)
NEUTROPHILS # BLD AUTO: 4.1 K/UL (ref 1.8–7.7)
NEUTROPHILS NFR BLD: 43.2 % (ref 38–73)
NRBC BLD-RTO: 0 /100 WBC
PHOSPHATE SERPL-MCNC: 2.3 MG/DL (ref 2.7–4.5)
PLATELET # BLD AUTO: 359 K/UL (ref 150–450)
PLATELET BLD QL SMEAR: ABNORMAL
PMV BLD AUTO: 8.5 FL (ref 9.2–12.9)
POTASSIUM SERPL-SCNC: 3.4 MMOL/L (ref 3.5–5.1)
PROT SERPL-MCNC: 4.7 G/DL (ref 6–8.4)
RBC # BLD AUTO: 4.03 M/UL (ref 4–5.4)
SODIUM SERPL-SCNC: 138 MMOL/L (ref 136–145)
WBC # BLD AUTO: 9.4 K/UL (ref 3.9–12.7)

## 2022-10-04 PROCEDURE — 63600175 PHARM REV CODE 636 W HCPCS: Performed by: INTERNAL MEDICINE

## 2022-10-04 PROCEDURE — 25000003 PHARM REV CODE 250: Performed by: HOSPITALIST

## 2022-10-04 PROCEDURE — 83735 ASSAY OF MAGNESIUM: CPT | Performed by: HOSPITALIST

## 2022-10-04 PROCEDURE — 85025 COMPLETE CBC W/AUTO DIFF WBC: CPT | Performed by: INTERNAL MEDICINE

## 2022-10-04 PROCEDURE — 25000003 PHARM REV CODE 250: Performed by: INTERNAL MEDICINE

## 2022-10-04 PROCEDURE — 63600175 PHARM REV CODE 636 W HCPCS: Performed by: HOSPITALIST

## 2022-10-04 PROCEDURE — 80053 COMPREHEN METABOLIC PANEL: CPT | Performed by: HOSPITALIST

## 2022-10-04 PROCEDURE — 99900035 HC TECH TIME PER 15 MIN (STAT)

## 2022-10-04 PROCEDURE — 94761 N-INVAS EAR/PLS OXIMETRY MLT: CPT

## 2022-10-04 PROCEDURE — 84100 ASSAY OF PHOSPHORUS: CPT | Performed by: HOSPITALIST

## 2022-10-04 PROCEDURE — S0030 INJECTION, METRONIDAZOLE: HCPCS | Performed by: INTERNAL MEDICINE

## 2022-10-04 PROCEDURE — 36415 COLL VENOUS BLD VENIPUNCTURE: CPT | Performed by: HOSPITALIST

## 2022-10-04 RX ORDER — LANOLIN ALCOHOL/MO/W.PET/CERES
400 CREAM (GRAM) TOPICAL 2 TIMES DAILY
Qty: 60 TABLET | Refills: 0 | Status: SHIPPED | OUTPATIENT
Start: 2022-10-04 | End: 2022-11-04

## 2022-10-04 RX ORDER — SODIUM,POTASSIUM PHOSPHATES 280-250MG
2 POWDER IN PACKET (EA) ORAL ONCE
Status: COMPLETED | OUTPATIENT
Start: 2022-10-04 | End: 2022-10-04

## 2022-10-04 RX ORDER — LEVOFLOXACIN 750 MG/1
750 TABLET ORAL DAILY
Qty: 5 TABLET | Refills: 0 | Status: SHIPPED | OUTPATIENT
Start: 2022-10-04 | End: 2022-10-09

## 2022-10-04 RX ORDER — POTASSIUM CHLORIDE 20 MEQ/1
20 TABLET, EXTENDED RELEASE ORAL DAILY
Qty: 10 TABLET | Refills: 0 | Status: SHIPPED | OUTPATIENT
Start: 2022-10-04 | End: 2022-10-14

## 2022-10-04 RX ORDER — MAGNESIUM SULFATE HEPTAHYDRATE 40 MG/ML
2 INJECTION, SOLUTION INTRAVENOUS ONCE
Status: COMPLETED | OUTPATIENT
Start: 2022-10-04 | End: 2022-10-04

## 2022-10-04 RX ORDER — PANTOPRAZOLE SODIUM 40 MG/1
40 TABLET, DELAYED RELEASE ORAL DAILY
Qty: 30 TABLET | Refills: 0 | Status: SHIPPED | OUTPATIENT
Start: 2022-10-04 | End: 2022-11-04

## 2022-10-04 RX ADMIN — POTASSIUM BICARBONATE 35 MEQ: 391 TABLET, EFFERVESCENT ORAL at 08:10

## 2022-10-04 RX ADMIN — Medication 800 MG: at 02:10

## 2022-10-04 RX ADMIN — CHLORPROMAZINE HYDROCHLORIDE 50 MG: 25 TABLET, FILM COATED ORAL at 08:10

## 2022-10-04 RX ADMIN — METRONIDAZOLE 500 MG: 5 INJECTION, SOLUTION INTRAVENOUS at 10:10

## 2022-10-04 RX ADMIN — POTASSIUM BICARBONATE 35 MEQ: 391 TABLET, EFFERVESCENT ORAL at 10:10

## 2022-10-04 RX ADMIN — ONDANSETRON 4 MG: 2 INJECTION INTRAMUSCULAR; INTRAVENOUS at 08:10

## 2022-10-04 RX ADMIN — Medication 800 MG: at 08:10

## 2022-10-04 RX ADMIN — DEXTROSE, SODIUM CHLORIDE, AND POTASSIUM CHLORIDE: 5; .45; .3 INJECTION INTRAVENOUS at 12:10

## 2022-10-04 RX ADMIN — POTASSIUM BICARBONATE 60 MEQ: 391 TABLET, EFFERVESCENT ORAL at 12:10

## 2022-10-04 RX ADMIN — POTASSIUM, SODIUM PHOSPHATES 280 MG-160 MG-250 MG ORAL POWDER PACKET 2 PACKET: POWDER IN PACKET at 11:10

## 2022-10-04 RX ADMIN — BUSPIRONE HYDROCHLORIDE 10 MG: 5 TABLET ORAL at 08:10

## 2022-10-04 RX ADMIN — MAGNESIUM SULFATE HEPTAHYDRATE 2 G: 40 INJECTION, SOLUTION INTRAVENOUS at 11:10

## 2022-10-04 RX ADMIN — ALPRAZOLAM 0.25 MG: 0.25 TABLET ORAL at 12:10

## 2022-10-04 RX ADMIN — TRAMADOL HYDROCHLORIDE 50 MG: 50 TABLET, COATED ORAL at 08:10

## 2022-10-04 RX ADMIN — PANTOPRAZOLE SODIUM 40 MG: 40 TABLET, DELAYED RELEASE ORAL at 05:10

## 2022-10-04 RX ADMIN — Medication 800 MG: at 11:10

## 2022-10-04 RX ADMIN — METRONIDAZOLE 500 MG: 5 INJECTION, SOLUTION INTRAVENOUS at 02:10

## 2022-10-04 NOTE — HOSPITAL COURSE
47-year-old lady with multiple psych problems, polypharmacy came with abdominal pain, nausea found to have UTI, ileus.  Overall symptomatology is consistent with gastroparesis likely due to polypharmacy.  She improved remarkably with antibiotics, IV hydration, aggressive electrolyte replacement.  She is tolerating diet, afebrile, had bowel movements.  She received maximum benefit of inpatient stay, today she was discharged home in hemodynamically stable condition with new prescription of Levaquin.  She was advised to take small frequent meals, unfortunately she is not a good candidate for Reglan due to her outpatient psych regimen, risk of tardive dyskinesia and prolonged QT. She was advised to follow-up with her PCP, GI and Psychiatry.     I have seen the patient on the day of discharge and reviewed the discharge instructions as outlined below. Patient verbalized understanding and is aware to contact primary care physician or return to ED if new or worsening symptoms.

## 2022-10-04 NOTE — DISCHARGE SUMMARY
[facility] Medicine  Discharge Summary      Patient Name: Rosalina Jacobo  MRN: [id number]  Patient Class: IP- Inpatient  Admission Date: 10/2/2022  Hospital Length of Stay: 2 days  Discharge Date and Time: 10/4/2022  2:00 PM  Attending Physician: No att. 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 The pain waxes and wanes of own accord, but is worsened by eating/drinking. She was seen here and admitted 09/29 but left the ED after being admitted (AMA) because she wanted more pain medicine than was being given to her. She returned when her discomfort persisted. She was hypotensive (90's) and tachycardic (120's) coming through the door. This has improved after bolus of three liters. She has not been given narcotics because of the hypotension--she has been given Toradol, which has held her discomfort. She has had nausea without vomiting. She has had loose stools since leaving AM: brown watery; no blood or black--C dif toxin and isolation sent and started respectively. No CP/SOB. Patient stated she will not leave AMA this visit.     In ED: CT Abdo/Pelvis: early ileus and cystitis. CXR reviewed: NAPD. EKG reviewed: sinus with flattened T waves laterally, but no acute segments. Labs reivewed and noted below: leukocytosis with normal H/H; trivial hyponatremia;. hypokalemia (treated in ED); mildly elevated lactate. COVID negative. Urine dirty and reflexed. Blood cultured.    Discussed with ED MD: Inpatient admission for ileus and possible early sepsis; volume replaced in ED and  ml/hr continued; broad spectrum antibiotics until cultures are specified and sensitivities known; non-narcotic analgesia. AM labs for review; NPO except ice chips  and sips with meds      * No surgery found *      Hospital Course:   47-year-old lady with multiple psych problems, polypharmacy came with abdominal pain, nausea found to have UTI, ileus.  Overall symptomatology is consistent with gastroparesis likely due to polypharmacy.  She improved remarkably with antibiotics, IV hydration, aggressive electrolyte replacement.  She is tolerating diet, afebrile, had bowel movements.  She received maximum benefit of inpatient stay, today she was discharged home in hemodynamically stable condition with new prescription of Levaquin.  She was advised to take small frequent meals, unfortunately she is not a good candidate for Reglan due to her outpatient psych regimen, risk of tardive dyskinesia and prolonged QT. She was advised to follow-up with her PCP, GI and Psychiatry.     I have seen the patient on the day of discharge and reviewed the discharge instructions as outlined below. Patient verbalized understanding and is aware to contact primary care physician or return to ED if new or worsening symptoms.        Goals of Care Treatment Preferences:  Code Status: Full Code      Consults:     No new Assessment & Plan notes have been filed under this hospital service since the last note was generated.  Service: Hospital Medicine    Final Active Diagnoses:    Diagnosis Date Noted POA    Gastroparesis [K31.84] 10/04/2022 Yes    Hypokalemia [E87.6] 10/04/2022 Yes    Hypomagnesemia [E83.42] 10/04/2022 Yes    Cystitis [N30.90] 10/02/2022 Yes      Problems Resolved During this Admission:    Diagnosis Date Noted Date Resolved POA    PRINCIPAL PROBLEM:  Ileus [K56.7] 09/29/2022 10/04/2022 Yes    Severe sepsis [A41.9, R65.20] 10/02/2022 10/04/2022 Yes       Discharged Condition: good    Disposition: Home or Self Care    Follow Up:   Follow-up Information     Alberto Fairchild MD Follow up in 1 week(s).    Specialty: Internal Medicine  Contact information:  [address]  [address]  [place]  [place] [address]  [phone]             Jonathan Peacock MD Follow up in 1 week(s).    Specialty: Gastroenterology  Contact information:  [address] [place] [address] [phone]                       Patient Instructions:      Diet Adult Regular   Order Comments: SMALL FREQUENT MEALS     Notify your health care provider if you experience any of the following:  temperature >100.4     Notify your health care provider if you experience any of the following:  persistent nausea and vomiting or diarrhea     Activity as tolerated       Significant Diagnostic Studies: Labs: All labs within the past 24 hours have been reviewed    Pending Diagnostic Studies:     None         Medications:  Reconciled Home Medications:      Medication List      START taking these medications    levoFLOXacin 750 MG tablet  Commonly known as: LEVAQUIN  Take 1 tablet (750 mg total) by mouth once daily. for 5 days     magnesium oxide 400 mg (241.3 mg magnesium) tablet  Commonly known as: MAG-OX  Take 1 tablet (400 mg total) by mouth 2 (two) times daily.     potassium chloride SA 20 MEQ tablet  Commonly known as: K-DUR,KLOR-CON  Take 1 tablet (20 mEq total) by mouth once daily for 10 days        CONTINUE taking these medications    busPIRone 10 MG tablet  Commonly known as: BUSPAR  Take 10 mg by mouth 2 (two) times daily.     chlorproMAZINE 50 MG tablet  Commonly known as: THORAZINE  Take 50 mg by mouth 3 (three) times daily.     diazePAM 10 MG Tab  Commonly known as: VALIUM  Take 1 tablet twice a day     pantoprazole 40 MG tablet  Commonly known as: PROTONIX  Take 1 tablet (40 mg total) by mouth once daily.        STOP taking these medications    ALPRAZolam 0.25 MG tablet  Commonly known as: XANAX            Indwelling Lines/Drains at time of discharge:   Lines/Drains/Airways     None                 Time spent on the discharge of patient: 35 minutes         Earnestine Juares MD  Department of Hospital Medicine  [facility]  [facility]

## 2022-10-04 NOTE — CARE UPDATE
10/04/22 0822   PRE-TX-O2   O2 Device (Oxygen Therapy) room air   Pulse Oximetry Type Continuous   $ Pulse Oximetry - Multiple Charge Pulse Oximetry - Multiple   Resp 15   Respiratory Evaluation   $ Care Plan Tech Time 15 min

## 2022-10-04 NOTE — PLAN OF CARE
Discharge orders and chart reviewed with no further post-acute discharge needs identified at this time.  At this time, patient is cleared for discharge from Case Management standpoint.        10/04/22 1155   Final Note   Assessment Type Final Discharge Note   Anticipated Discharge Disposition Home   Post-Acute Status   Discharge Delays None known at this time

## 2022-10-04 NOTE — CARE UPDATE
10/03/22 2011   PRE-TX-O2   O2 Device (Oxygen Therapy) room air   Pulse Oximetry Type Continuous   $ Pulse Oximetry - Multiple Charge Pulse Oximetry - Multiple   Education   $ Education 15 min   Respiratory Evaluation   $ Care Plan Tech Time 15 min

## 2022-10-04 NOTE — PLAN OF CARE
Problem: Adult Inpatient Plan of Care  Goal: Plan of Care Review  Outcome: Met  Goal: Patient-Specific Goal (Individualized)  Outcome: Met  Goal: Absence of Hospital-Acquired Illness or Injury  Outcome: Met  Goal: Optimal Comfort and Wellbeing  Outcome: Met  Goal: Readiness for Transition of Care  Outcome: Met     Problem: Infection  Goal: Absence of Infection Signs and Symptoms  Outcome: Met     Problem: Adjustment to Illness (Sepsis/Septic Shock)  Goal: Optimal Coping  Outcome: Met     Problem: Bleeding (Sepsis/Septic Shock)  Goal: Absence of Bleeding  Outcome: Met     Problem: Glycemic Control Impaired (Sepsis/Septic Shock)  Goal: Blood Glucose Level Within Desired Range  Outcome: Met     Problem: Infection Progression (Sepsis/Septic Shock)  Goal: Absence of Infection Signs and Symptoms  Outcome: Met     Problem: Nutrition Impaired (Sepsis/Septic Shock)  Goal: Optimal Nutrition Intake  Outcome: Met

## 2022-10-05 LAB
STOOL CULTURE: NORMAL
STOOL CULTURE: NORMAL

## 2022-10-06 DIAGNOSIS — K52.9 NONINFECTIOUS GASTROENTERITIS, UNSPECIFIED TYPE: Primary | ICD-10-CM

## 2022-10-06 DIAGNOSIS — R19.7 DIARRHEA: ICD-10-CM

## 2022-10-08 LAB
BACTERIA BLD CULT: NORMAL
BACTERIA BLD CULT: NORMAL
LABCORP MISC TEST CODE: 8623
LABCORP MISC TEST NAME: NORMAL
LABCORP MISCELLANEOUS TEST: NORMAL

## 2022-10-10 ENCOUNTER — HOSPITAL ENCOUNTER (EMERGENCY)
Facility: HOSPITAL | Age: 47
Discharge: HOME | End: 2022-10-10
Attending: EMERGENCY MEDICINE
Payer: MEDICARE

## 2022-10-10 ENCOUNTER — LAB VISIT (OUTPATIENT)
Dept: LAB | Facility: HOSPITAL | Age: 47
End: 2022-10-10
Payer: MEDICARE

## 2022-10-10 VITALS
RESPIRATION RATE: 16 BRPM | HEART RATE: 78 BPM | HEIGHT: 66 IN | DIASTOLIC BLOOD PRESSURE: 60 MMHG | WEIGHT: 177 LBS | BODY MASS INDEX: 28.45 KG/M2 | TEMPERATURE: 99 F | OXYGEN SATURATION: 97 % | SYSTOLIC BLOOD PRESSURE: 118 MMHG

## 2022-10-10 DIAGNOSIS — R10.9 CHRONIC ABDOMINAL PAIN: Primary | ICD-10-CM

## 2022-10-10 DIAGNOSIS — G89.29 CHRONIC ABDOMINAL PAIN: Primary | ICD-10-CM

## 2022-10-10 DIAGNOSIS — R19.7 DIARRHEA OF PRESUMED INFECTIOUS ORIGIN: Primary | ICD-10-CM

## 2022-10-10 LAB
ALBUMIN SERPL BCP-MCNC: 3.1 G/DL (ref 3.5–5.2)
ALP SERPL-CCNC: 88 U/L (ref 55–135)
ALT SERPL W/O P-5'-P-CCNC: 21 U/L (ref 10–44)
AMPHET+METHAMPHET UR QL: NEGATIVE
ANION GAP SERPL CALC-SCNC: 9 MMOL/L (ref 8–16)
AST SERPL-CCNC: 21 U/L (ref 10–40)
BARBITURATES UR QL SCN>200 NG/ML: NEGATIVE
BASOPHILS # BLD AUTO: 0.11 K/UL (ref 0–0.2)
BASOPHILS NFR BLD: 1.1 % (ref 0–1.9)
BENZODIAZ UR QL SCN>200 NG/ML: ABNORMAL
BILIRUB SERPL-MCNC: 0.1 MG/DL (ref 0.1–1)
BILIRUB UR QL STRIP: NEGATIVE
BUN SERPL-MCNC: 13 MG/DL (ref 6–20)
BZE UR QL SCN: NEGATIVE
CALCIUM SERPL-MCNC: 8.9 MG/DL (ref 8.7–10.5)
CANNABINOIDS UR QL SCN: ABNORMAL
CHLORIDE SERPL-SCNC: 111 MMOL/L (ref 95–110)
CLARITY UR: CLEAR
CO2 SERPL-SCNC: 21 MMOL/L (ref 23–29)
COLOR UR: YELLOW
CREAT SERPL-MCNC: 0.8 MG/DL (ref 0.5–1.4)
CREAT UR-MCNC: 71.5 MG/DL (ref 15–325)
DIFFERENTIAL METHOD: ABNORMAL
EOSINOPHIL # BLD AUTO: 0.9 K/UL (ref 0–0.5)
EOSINOPHIL NFR BLD: 9.2 % (ref 0–8)
ERYTHROCYTE [DISTWIDTH] IN BLOOD BY AUTOMATED COUNT: 13.5 % (ref 11.5–14.5)
EST. GFR  (NO RACE VARIABLE): >60 ML/MIN/1.73 M^2
GLUCOSE SERPL-MCNC: 108 MG/DL (ref 70–110)
GLUCOSE UR QL STRIP: NEGATIVE
HCT VFR BLD AUTO: 39 % (ref 37–48.5)
HGB BLD-MCNC: 13.4 G/DL (ref 12–16)
HGB UR QL STRIP: NEGATIVE
IMM GRANULOCYTES # BLD AUTO: 0.06 K/UL (ref 0–0.04)
IMM GRANULOCYTES NFR BLD AUTO: 0.6 % (ref 0–0.5)
KETONES UR QL STRIP: NEGATIVE
LEUKOCYTE ESTERASE UR QL STRIP: NEGATIVE
LIPASE SERPL-CCNC: 31 U/L (ref 4–60)
LYMPHOCYTES # BLD AUTO: 2.5 K/UL (ref 1–4.8)
LYMPHOCYTES NFR BLD: 25.9 % (ref 18–48)
MCH RBC QN AUTO: 30.5 PG (ref 27–31)
MCHC RBC AUTO-ENTMCNC: 34.4 G/DL (ref 32–36)
MCV RBC AUTO: 89 FL (ref 82–98)
METHADONE UR QL SCN>300 NG/ML: NEGATIVE
MONOCYTES # BLD AUTO: 0.5 K/UL (ref 0.3–1)
MONOCYTES NFR BLD: 5.4 % (ref 4–15)
NEUTROPHILS # BLD AUTO: 5.7 K/UL (ref 1.8–7.7)
NEUTROPHILS NFR BLD: 57.8 % (ref 38–73)
NITRITE UR QL STRIP: NEGATIVE
NRBC BLD-RTO: 0 /100 WBC
OPIATES UR QL SCN: NEGATIVE
PCP UR QL SCN>25 NG/ML: NEGATIVE
PH UR STRIP: 8 [PH] (ref 5–8)
PLATELET # BLD AUTO: 400 K/UL (ref 150–450)
PMV BLD AUTO: 9.1 FL (ref 9.2–12.9)
POTASSIUM SERPL-SCNC: 3.8 MMOL/L (ref 3.5–5.1)
PROT SERPL-MCNC: 6.4 G/DL (ref 6–8.4)
PROT UR QL STRIP: NEGATIVE
RBC # BLD AUTO: 4.4 M/UL (ref 4–5.4)
SODIUM SERPL-SCNC: 141 MMOL/L (ref 136–145)
SP GR UR STRIP: 1.02 (ref 1–1.03)
TOXICOLOGY INFORMATION: ABNORMAL
URN SPEC COLLECT METH UR: ABNORMAL
UROBILINOGEN UR STRIP-ACNC: ABNORMAL EU/DL
WBC # BLD AUTO: 9.78 K/UL (ref 3.9–12.7)

## 2022-10-10 PROCEDURE — 81003 URINALYSIS AUTO W/O SCOPE: CPT | Mod: 59 | Performed by: PHYSICIAN ASSISTANT

## 2022-10-10 PROCEDURE — 96361 HYDRATE IV INFUSION ADD-ON: CPT

## 2022-10-10 PROCEDURE — 83690 ASSAY OF LIPASE: CPT | Performed by: PHYSICIAN ASSISTANT

## 2022-10-10 PROCEDURE — 36415 COLL VENOUS BLD VENIPUNCTURE: CPT | Performed by: PHYSICIAN ASSISTANT

## 2022-10-10 PROCEDURE — 99284 EMERGENCY DEPT VISIT MOD MDM: CPT | Mod: 25

## 2022-10-10 PROCEDURE — 63600175 PHARM REV CODE 636 W HCPCS: Performed by: PHYSICIAN ASSISTANT

## 2022-10-10 PROCEDURE — 85025 COMPLETE CBC W/AUTO DIFF WBC: CPT | Performed by: PHYSICIAN ASSISTANT

## 2022-10-10 PROCEDURE — 80053 COMPREHEN METABOLIC PANEL: CPT | Performed by: PHYSICIAN ASSISTANT

## 2022-10-10 PROCEDURE — 25000003 PHARM REV CODE 250: Performed by: PHYSICIAN ASSISTANT

## 2022-10-10 PROCEDURE — 80307 DRUG TEST PRSMV CHEM ANLYZR: CPT | Performed by: PHYSICIAN ASSISTANT

## 2022-10-10 PROCEDURE — 96375 TX/PRO/DX INJ NEW DRUG ADDON: CPT

## 2022-10-10 PROCEDURE — 87507 IADNA-DNA/RNA PROBE TQ 12-25: CPT

## 2022-10-10 PROCEDURE — 96374 THER/PROPH/DIAG INJ IV PUSH: CPT

## 2022-10-10 PROCEDURE — 63600175 PHARM REV CODE 636 W HCPCS: Performed by: EMERGENCY MEDICINE

## 2022-10-10 RX ORDER — DROPERIDOL 2.5 MG/ML
1.25 INJECTION, SOLUTION INTRAMUSCULAR; INTRAVENOUS ONCE
Status: COMPLETED | OUTPATIENT
Start: 2022-10-10 | End: 2022-10-10

## 2022-10-10 RX ORDER — DIPHENHYDRAMINE HYDROCHLORIDE 50 MG/ML
25 INJECTION INTRAMUSCULAR; INTRAVENOUS
Status: COMPLETED | OUTPATIENT
Start: 2022-10-10 | End: 2022-10-10

## 2022-10-10 RX ORDER — KETOROLAC TROMETHAMINE 30 MG/ML
15 INJECTION, SOLUTION INTRAMUSCULAR; INTRAVENOUS
Status: COMPLETED | OUTPATIENT
Start: 2022-10-10 | End: 2022-10-10

## 2022-10-10 RX ORDER — ONDANSETRON 2 MG/ML
4 INJECTION INTRAMUSCULAR; INTRAVENOUS
Status: COMPLETED | OUTPATIENT
Start: 2022-10-10 | End: 2022-10-10

## 2022-10-10 RX ORDER — METOCLOPRAMIDE HYDROCHLORIDE 5 MG/ML
10 INJECTION INTRAMUSCULAR; INTRAVENOUS
Status: COMPLETED | OUTPATIENT
Start: 2022-10-10 | End: 2022-10-10

## 2022-10-10 RX ADMIN — SODIUM CHLORIDE 1000 ML: 0.9 INJECTION, SOLUTION INTRAVENOUS at 05:10

## 2022-10-10 RX ADMIN — METOCLOPRAMIDE 10 MG: 5 INJECTION, SOLUTION INTRAMUSCULAR; INTRAVENOUS at 08:10

## 2022-10-10 RX ADMIN — ONDANSETRON 4 MG: 2 INJECTION INTRAMUSCULAR; INTRAVENOUS at 07:10

## 2022-10-10 RX ADMIN — DIPHENHYDRAMINE HYDROCHLORIDE 25 MG: 50 INJECTION INTRAMUSCULAR; INTRAVENOUS at 07:10

## 2022-10-10 RX ADMIN — DROPERIDOL 1.25 MG: 2.5 INJECTION, SOLUTION INTRAMUSCULAR; INTRAVENOUS at 09:10

## 2022-10-10 RX ADMIN — KETOROLAC TROMETHAMINE 15 MG: 30 INJECTION, SOLUTION INTRAMUSCULAR; INTRAVENOUS at 09:10

## 2022-10-10 NOTE — FIRST PROVIDER EVALUATION
Emergency Department TeleTriage Encounter Note      CHIEF COMPLAINT    Chief Complaint   Patient presents with    Diarrhea    Vomiting    Abdominal Pain     X 1 month     Chills       VITAL SIGNS   Initial Vitals [10/10/22 1706]   BP Pulse Resp Temp SpO2   128/63 (!) 116 20 99 °F (37.2 °C) 97 %      MAP       --            ALLERGIES    Review of patient's allergies indicates:   Allergen Reactions    Quetiapine Other (See Comments)     Dystonic reaction  Other reaction(s): Unknown    Aspirin     Gabapentin      Other reaction(s): Unknown    Haloperidol lactate      Other reaction(s): Unknown    Pcn [penicillins] Rash       PROVIDER TRIAGE NOTE  HPI: Rosalina Jacobo, a 47 y.o. female presents to the ED for evaluation of N/V/D x 1 month.       Constitutional: well nourished, well developed, appearing stated age, NAD   HEENT: NCAT, symmetrical lids, No obvious facial deformity.  Normal phonation. Normal Conjunctiva, Gross EOMs intact   Neck: NAROM   Respiratory: Normal effort.  No obvious use of accessory muscles   Musculoskeletal: Moved upper extremities well   Neuro: Alert, answers questions appropriately    Psych: appropriate mood and affect      ORDERS  Labs Reviewed - No data to display    ED Orders (720h ago, onward)      None              Virtual Visit Note: The provider triage portion of this emergency department evaluation and documentation was performed via [employer], a HIPAA-compliant telemedicine application, in concert with a tele-presenter in the room. A face to face patient evaluation with one of my colleagues will occur once the patient is placed in an emergency department room.      DISCLAIMER: This note was prepared with [employer] voice recognition transcription software. Garbled syntax, mangled pronouns, and other bizarre constructions may be attributed to that software system.

## 2022-10-11 NOTE — ED PROVIDER NOTES
"Encounter Date: 10/10/2022       History     Chief Complaint   Patient presents with    Diarrhea    Vomiting    Abdominal Pain     X 1 month     Chills     47-year-old female with history of anxiety, bipolar disorder and chronic abdominal pain presents to the ER with diffuse abdominal pain, nausea, vomiting.  She reports symptoms for 1 month.  She has been admitted to [facility] twice recently for these same complaints.  She saw Gastroenterology Dr. Peacock who is her GI doc last week for these complaints.  She states that he told her that her complaints are "outside of his scope of practice." She has had years of chronic abdominal pain and vomiting and these sx are similar.  She has been previously diagnosed with ileus, gastroparesis.  She left [facility] recently against medical advice when she did not feel her pain was being addressed adequately.    The history is provided by the patient.   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    Endometriosis     Headache     Heartburn     Hypotension     Infertility, female     Insomnia     Migraine headache     PTSD (post-traumatic stress disorder)     Trauma      Past Surgical History:   Procedure Laterality Date    APPENDECTOMY      BLADDER SUSPENSION      CERVICAL FUSION       SECTION      CHOLECYSTECTOMY      CYSTOSCOPY WITH BIOPSY OF BLADDER N/A 2018    Procedure: CYSTOSCOPY, WITH BLADDER BIOPSY, WITH FULGURATION " IF INDICATED;  Surgeon: Luna Slater MD;  Location: [facility] OR;  Service: Urology;  Laterality: N/A;    CYSTOSCOPY WITH HYDRODISTENSION OF BLADDER N/A 12/3/2019    Procedure: CYSTOSCOPY, WITH BLADDER HYDRODISTENSION;  Surgeon: Gumaro Mcgovern MD;  Location: [facility] OR;  Service: OB/GYN;  Laterality: N/A;    DILATION OF URETHRA N/A 12/12/2018    Procedure: DILATION, URETHRA;  Surgeon: Luna Slater MD;  Location: [facility] OR;  Service: Urology;  Laterality: N/A;    ESOPHAGOGASTRODUODENOSCOPY N/A 1/21/2022    Procedure: EGD (ESOPHAGOGASTRODUODENOSCOPY);  Surgeon: Satinder Rdz MD;  Location: [facility];  Service: Endoscopy;  Laterality: N/A;    HYSTERECTOMY      KNEE SURGERY Bilateral     TIBIA FRACTURE SURGERY Right     tibia/ fibula repaired with pins and screws    TOE SURGERY Left      Family History   Problem Relation Age of Onset    Hypertension Father     Hypertension Mother     Stroke Mother     Breast cancer Mother     Breast cancer Maternal Grandmother      Social History     Tobacco Use    Smoking status: Every Day     Packs/day: 1.00     Types: Cigarettes, Vaping with nicotine    Smokeless tobacco: Never   Substance Use Topics    Alcohol use: Yes     Comment: occ    Drug use: Yes     Types: Marijuana     Comment: last dose today.     Review of Systems   Constitutional:  Positive for chills.   Gastrointestinal:  Positive for abdominal pain, nausea and vomiting.   All other systems reviewed and are negative.    Physical Exam     Initial Vitals [10/10/22 1706]   BP Pulse Resp Temp SpO2   128/63 (!) 116 20 99 °F (37.2 °C) 97 %      MAP       --         Physical Exam    Nursing note and vitals reviewed.  Constitutional: She appears well-developed and well-nourished. She is not diaphoretic. No distress.   HENT:   Head: Normocephalic and atraumatic.   Eyes: EOM are normal.   Neck: Neck supple.   Normal range of motion.  Cardiovascular:  Normal rate, regular rhythm and normal heart sounds.     Exam  reveals no gallop and no friction rub.       No murmur heard.  Pulmonary/Chest: Breath sounds normal. No respiratory distress. She has no wheezes. She has no rhonchi. She has no rales.   Abdominal: She exhibits no distension. There is abdominal tenderness.   Musculoskeletal:         General: Normal range of motion.      Cervical back: Normal range of motion and neck supple.     Neurological: She is alert and oriented to person, place, and time.   Skin: Skin is warm and dry.   Psychiatric: Her behavior is normal. Judgment and thought content normal.   Anxious       ED Course   Procedures  Labs Reviewed   CBC W/ AUTO DIFFERENTIAL - Abnormal; Notable for the following components:       Result Value    MPV 9.1 (*)     Immature Granulocytes 0.6 (*)     Immature Grans (Abs) 0.06 (*)     Eos # 0.9 (*)     Eosinophil % 9.2 (*)     All other components within normal limits   COMPREHENSIVE METABOLIC PANEL - Abnormal; Notable for the following components:    Chloride 111 (*)     CO2 21 (*)     Albumin 3.1 (*)     All other components within normal limits   URINALYSIS, REFLEX TO URINE CULTURE - Abnormal; Notable for the following components:    Urobilinogen, UA 2.0-3.0 (*)     All other components within normal limits    Narrative:     Specimen Source->Urine   DRUG SCREEN PANEL, URINE EMERGENCY - Abnormal; Notable for the following components:    Benzodiazepines Presumptive Positive (*)     THC Presumptive Positive (*)     All other components within normal limits    Narrative:     Specimen Source->Urine   LIPASE          Imaging Results    None          Medications   ondansetron injection 4 mg (4 mg Intravenous Given 10/10/22 1956)   sodium chloride 0.9% bolus 1,000 mL (0 mLs Intravenous Stopped 10/10/22 2030)   metoclopramide HCl injection 10 mg (10 mg Intravenous Given 10/10/22 2001)   diphenhydrAMINE injection 25 mg (25 mg Intravenous Given 10/10/22 1959)   ketorolac injection 15 mg (15 mg Intravenous Given 10/10/22 2118)    droperidoL injection 1.25 mg (1.25 mg Intravenous Given 10/10/22 2120)     Medical Decision Making:   History:   Old Medical Records: I decided to obtain old medical records.  Clinical Tests:   Lab Tests: Ordered and Reviewed           ED Course as of 10/10/22 2343   Mon Oct 10, 2022   2210 47-year-old female presents with an exacerbation of her chronic abdominal pain.  Patient has been admitted several times at [facility] for this.  She was recently diagnosed with ileus and gastroparesis due to polypharmacy.  She also saw her gastroenterologist last week for these complaints.  She reports vomiting and diarrhea but has not had any vomiting in the ER.  Labs are unremarkable.  She is requesting referral to hematology which will be provided because she thinks her CBC is abnormal.  I do not believe she needs CT of the abdomen and pelvis.  She has had at least 4 of those this year already.  None of them showed any surgical condition.  Pain is much better at this time with Toradol and droperidol.  She will be discharged home. [EF]      ED Course User Index  [EF] Raheem Burgess MD                 Clinical Impression:   Final diagnoses:  [R10.9, G89.29] Chronic abdominal pain (Primary)      ED Disposition Condition    Discharge Stable          ED Prescriptions    None       Follow-up Information       Follow up With Specialties Details Why Contact [facility] Emergency Dept Emergency Medicine  As needed, If symptoms worsen [address] [place] [id number] [phone]    Jonathan Peacock MD Gastroenterology Schedule an appointment as soon as possible for a visit   [id number] [facility] [address]  [phone]               Raheem Burgess MD  10/10/22 2343

## 2022-10-12 ENCOUNTER — TELEPHONE (OUTPATIENT)
Dept: HEMATOLOGY/ONCOLOGY | Facility: CLINIC | Age: 47
End: 2022-10-12
Payer: MEDICARE

## 2022-10-12 DIAGNOSIS — R79.9 ABNORMAL BLOOD FINDING: Primary | ICD-10-CM

## 2022-10-12 NOTE — TELEPHONE ENCOUNTER
----- Message from Francisca Ortega sent at 10/11/2022  4:26 PM CDT -----  Type: Needs Medical Advice  Who Called:  Patient   Symptoms (please be specific):    How long has patient had these symptoms:    Pharmacy name and phone #:    Best Call Back Number: [phone]  Additional Information: Patient is requesting a call back to schedule an appt.

## 2022-10-12 NOTE — TELEPHONE ENCOUNTER
Called and spoke to pt.  Pt lives in [place], request [facility] hem.  She said the ER provider Dr Mock has in his notes to follow up with hem due to the abnormal CBC.  Please advise the pt.

## 2022-10-12 NOTE — NURSING
Oncology Navigation   Intake  Cancer Type: Benign hem  Internal / External Referral: Internal  Date of Referral: 10/12/22  Initial Nurse Navigator Contact: 10/12/22  Referral to Initial Contact Timeline (days): 0  Date Worked: 10/12/22  First Appointment Available: 10/14/22  Appointment Date: 10/14/22 (Dr. Khoobehi)  First Available Date vs. Scheduled Date (days): 0     Treatment                              Acuity      Follow Up  No follow-ups on file.

## 2022-10-13 ENCOUNTER — LAB VISIT (OUTPATIENT)
Dept: LAB | Facility: HOSPITAL | Age: 47
End: 2022-10-13
Attending: EMERGENCY MEDICINE
Payer: MEDICARE

## 2022-10-13 DIAGNOSIS — D72.10 EOSINOPHILIA: Primary | ICD-10-CM

## 2022-10-13 LAB
ALBUMIN SERPL BCP-MCNC: 3.5 G/DL (ref 3.5–5.2)
ALP SERPL-CCNC: 81 U/L (ref 55–135)
ALT SERPL W/O P-5'-P-CCNC: 17 U/L (ref 10–44)
ANION GAP SERPL CALC-SCNC: 5 MMOL/L (ref 8–16)
AST SERPL-CCNC: 17 U/L (ref 10–40)
BASOPHILS # BLD AUTO: 0.16 K/UL (ref 0–0.2)
BASOPHILS NFR BLD: 1.4 % (ref 0–1.9)
BILIRUB SERPL-MCNC: 0.4 MG/DL (ref 0.1–1)
BUN SERPL-MCNC: 10 MG/DL (ref 6–20)
CALCIUM SERPL-MCNC: 8.3 MG/DL (ref 8.7–10.5)
CHLORIDE SERPL-SCNC: 109 MMOL/L (ref 95–110)
CO2 SERPL-SCNC: 24 MMOL/L (ref 23–29)
CREAT SERPL-MCNC: 0.8 MG/DL (ref 0.5–1.4)
DIFFERENTIAL METHOD: ABNORMAL
EOSINOPHIL # BLD AUTO: 1.3 K/UL (ref 0–0.5)
EOSINOPHIL NFR BLD: 11.7 % (ref 0–8)
ERYTHROCYTE [DISTWIDTH] IN BLOOD BY AUTOMATED COUNT: 13.5 % (ref 11.5–14.5)
EST. GFR  (NO RACE VARIABLE): >60 ML/MIN/1.73 M^2
GLUCOSE SERPL-MCNC: 103 MG/DL (ref 70–110)
HCT VFR BLD AUTO: 37.2 % (ref 37–48.5)
HGB BLD-MCNC: 12.6 G/DL (ref 12–16)
IMM GRANULOCYTES # BLD AUTO: 0.06 K/UL (ref 0–0.04)
IMM GRANULOCYTES NFR BLD AUTO: 0.5 % (ref 0–0.5)
LYMPHOCYTES # BLD AUTO: 3.1 K/UL (ref 1–4.8)
LYMPHOCYTES NFR BLD: 26.7 % (ref 18–48)
MCH RBC QN AUTO: 30.3 PG (ref 27–31)
MCHC RBC AUTO-ENTMCNC: 33.9 G/DL (ref 32–36)
MCV RBC AUTO: 89 FL (ref 82–98)
MONOCYTES # BLD AUTO: 0.7 K/UL (ref 0.3–1)
MONOCYTES NFR BLD: 6.4 % (ref 4–15)
NEUTROPHILS # BLD AUTO: 6.1 K/UL (ref 1.8–7.7)
NEUTROPHILS NFR BLD: 53.3 % (ref 38–73)
NRBC BLD-RTO: 0 /100 WBC
PLATELET # BLD AUTO: 416 K/UL (ref 150–450)
PMV BLD AUTO: 9 FL (ref 9.2–12.9)
POTASSIUM SERPL-SCNC: 3.3 MMOL/L (ref 3.5–5.1)
PROT SERPL-MCNC: 6.6 G/DL (ref 6–8.4)
RBC # BLD AUTO: 4.16 M/UL (ref 4–5.4)
SODIUM SERPL-SCNC: 138 MMOL/L (ref 136–145)
WBC # BLD AUTO: 11.47 K/UL (ref 3.9–12.7)

## 2022-10-13 PROCEDURE — 36415 COLL VENOUS BLD VENIPUNCTURE: CPT | Performed by: EMERGENCY MEDICINE

## 2022-10-13 PROCEDURE — 85025 COMPLETE CBC W/AUTO DIFF WBC: CPT | Performed by: EMERGENCY MEDICINE

## 2022-10-13 PROCEDURE — 80053 COMPREHEN METABOLIC PANEL: CPT | Performed by: EMERGENCY MEDICINE

## 2022-10-14 ENCOUNTER — LAB VISIT (OUTPATIENT)
Dept: LAB | Facility: HOSPITAL | Age: 47
End: 2022-10-14
Attending: INTERNAL MEDICINE
Payer: MEDICARE

## 2022-10-14 ENCOUNTER — OFFICE VISIT (OUTPATIENT)
Dept: HEMATOLOGY/ONCOLOGY | Facility: CLINIC | Age: 47
End: 2022-10-14
Payer: MEDICARE

## 2022-10-14 VITALS
TEMPERATURE: 98 F | BODY MASS INDEX: 28.16 KG/M2 | WEIGHT: 175.25 LBS | HEART RATE: 92 BPM | RESPIRATION RATE: 12 BRPM | HEIGHT: 66 IN | DIASTOLIC BLOOD PRESSURE: 76 MMHG | OXYGEN SATURATION: 97 % | SYSTOLIC BLOOD PRESSURE: 121 MMHG

## 2022-10-14 DIAGNOSIS — R79.9 ABNORMAL BLOOD FINDING: ICD-10-CM

## 2022-10-14 DIAGNOSIS — D72.19 OTHER EOSINOPHILIA: Primary | ICD-10-CM

## 2022-10-14 DIAGNOSIS — D53.9 NUTRITIONAL ANEMIA, UNSPECIFIED: ICD-10-CM

## 2022-10-14 DIAGNOSIS — D72.19 OTHER EOSINOPHILIA: ICD-10-CM

## 2022-10-14 LAB
FOLATE SERPL-MCNC: 9.4 NG/ML (ref 4–24)
LDH SERPL L TO P-CCNC: 151 U/L (ref 110–260)
VIT B12 SERPL-MCNC: 369 PG/ML (ref 210–950)

## 2022-10-14 PROCEDURE — 1159F PR MEDICATION LIST DOCUMENTED IN MEDICAL RECORD: ICD-10-PCS | Mod: CPTII,S$GLB,, | Performed by: INTERNAL MEDICINE

## 2022-10-14 PROCEDURE — 82787 IGG 1 2 3 OR 4 EACH: CPT | Mod: 91 | Performed by: INTERNAL MEDICINE

## 2022-10-14 PROCEDURE — 1159F MED LIST DOCD IN RCRD: CPT | Mod: CPTII,S$GLB,, | Performed by: INTERNAL MEDICINE

## 2022-10-14 PROCEDURE — 83921 ORGANIC ACID SINGLE QUANT: CPT | Performed by: INTERNAL MEDICINE

## 2022-10-14 PROCEDURE — 82746 ASSAY OF FOLIC ACID SERUM: CPT | Performed by: INTERNAL MEDICINE

## 2022-10-14 PROCEDURE — 83615 LACTATE (LD) (LDH) ENZYME: CPT | Performed by: INTERNAL MEDICINE

## 2022-10-14 PROCEDURE — 3074F SYST BP LT 130 MM HG: CPT | Mod: CPTII,S$GLB,, | Performed by: INTERNAL MEDICINE

## 2022-10-14 PROCEDURE — 99204 PR OFFICE/OUTPT VISIT, NEW, LEVL IV, 45-59 MIN: ICD-10-PCS | Mod: S$GLB,,, | Performed by: INTERNAL MEDICINE

## 2022-10-14 PROCEDURE — 99999 PR PBB SHADOW E&M-EST. PATIENT-LVL V: CPT | Mod: PBBFAC,,, | Performed by: INTERNAL MEDICINE

## 2022-10-14 PROCEDURE — 99204 OFFICE O/P NEW MOD 45 MIN: CPT | Mod: S$GLB,,, | Performed by: INTERNAL MEDICINE

## 2022-10-14 PROCEDURE — 3078F DIAST BP <80 MM HG: CPT | Mod: CPTII,S$GLB,, | Performed by: INTERNAL MEDICINE

## 2022-10-14 PROCEDURE — 3078F PR MOST RECENT DIASTOLIC BLOOD PRESSURE < 80 MM HG: ICD-10-PCS | Mod: CPTII,S$GLB,, | Performed by: INTERNAL MEDICINE

## 2022-10-14 PROCEDURE — 1160F PR REVIEW ALL MEDS BY PRESCRIBER/CLIN PHARMACIST DOCUMENTED: ICD-10-PCS | Mod: CPTII,S$GLB,, | Performed by: INTERNAL MEDICINE

## 2022-10-14 PROCEDURE — 83520 IMMUNOASSAY QUANT NOS NONAB: CPT | Performed by: INTERNAL MEDICINE

## 2022-10-14 PROCEDURE — 99999 PR PBB SHADOW E&M-EST. PATIENT-LVL V: ICD-10-PCS | Mod: PBBFAC,,, | Performed by: INTERNAL MEDICINE

## 2022-10-14 PROCEDURE — 1111F PR DISCHARGE MEDS RECONCILED W/ CURRENT OUTPATIENT MED LIST: ICD-10-PCS | Mod: CPTII,S$GLB,, | Performed by: INTERNAL MEDICINE

## 2022-10-14 PROCEDURE — 1111F DSCHRG MED/CURRENT MED MERGE: CPT | Mod: CPTII,S$GLB,, | Performed by: INTERNAL MEDICINE

## 2022-10-14 PROCEDURE — 82607 VITAMIN B-12: CPT | Performed by: INTERNAL MEDICINE

## 2022-10-14 PROCEDURE — 1160F RVW MEDS BY RX/DR IN RCRD: CPT | Mod: CPTII,S$GLB,, | Performed by: INTERNAL MEDICINE

## 2022-10-14 PROCEDURE — 82785 ASSAY OF IGE: CPT | Performed by: INTERNAL MEDICINE

## 2022-10-14 PROCEDURE — 3074F PR MOST RECENT SYSTOLIC BLOOD PRESSURE < 130 MM HG: ICD-10-PCS | Mod: CPTII,S$GLB,, | Performed by: INTERNAL MEDICINE

## 2022-10-14 NOTE — PROGRESS NOTES
Service Date:  10/14/22    Chief Complaint: Eosinophilia    Rosalina Jacobo is a 47 y.o. female here to follow-up after recent hospital visit.  Patient has a constellation of symptoms and wanted to be seen to have her CBC evaluated.  She complains of night sweats and abdominal pain over the past month.  She had an EGD in January which was not yielding.  She is also had a colonoscopy she states, but is unsure of the time, believes it was last year.  She had a CT abdomen done in the hospital which showed a possible ileus.  She has no family history of autoimmune disorder.  She has no personal history of autoimmune disorder or malignancy.  She denies tobacco use.  She smokes marijuana for her PTSD.    Review of Systems   Constitutional: Negative.    HENT: Negative.     Eyes: Negative.    Respiratory: Negative.     Cardiovascular: Negative.    Gastrointestinal: Negative.    Endocrine: Negative.    Genitourinary: Negative.    Musculoskeletal: Negative.    Integumentary:  Negative.   Neurological: Negative.    Hematological: Negative.    Psychiatric/Behavioral: Negative.        Current Outpatient Medications   Medication Instructions    busPIRone (BUSPAR) 10 mg, Oral, 2 times daily    chlorproMAZINE (THORAZINE) 50 mg, Oral, 3 times daily    diazePAM (VALIUM) 10 MG Tab take 1 tablet by mouth twice a day as needed    HYDROcodone-acetaminophen (NORCO)  mg per tablet take 1 tablet by mouth every 8 hours as needed    HYDROcodone-acetaminophen (NORCO) 7.5-325 mg per tablet take 1 tablet by mouth every 6 hours    magnesium oxide (MAG-OX) 400 mg, Oral, 2 times daily    pantoprazole (PROTONIX) 40 MG tablet Take 1 tablet (40 mg total) by mouth once daily.    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    Endometriosis     Headache     Heartburn     Hypotension     Infertility, female     Insomnia     Migraine headache     PTSD (post-traumatic stress disorder)     Trauma         Past Surgical History:   Procedure Laterality Date    APPENDECTOMY      BLADDER SUSPENSION      CERVICAL FUSION       SECTION      CHOLECYSTECTOMY      CYSTOSCOPY WITH BIOPSY OF BLADDER N/A 2018    Procedure: CYSTOSCOPY, WITH BLADDER BIOPSY, WITH FULGURATION IF INDICATED;  Surgeon: Luna Slater MD;  Location: [facility] OR;  Service: Urology;  Laterality: N/A;    CYSTOSCOPY WITH HYDRODISTENSION OF BLADDER N/A 12/3/2019    Procedure: CYSTOSCOPY, WITH BLADDER HYDRODISTENSION;  Surgeon: Gumaro Mcgovern MD;  [facility];  Service: OB/GYN;  Laterality: N/A;    DILATION OF URETHRA N/A 2018    Procedure: DILATION, URETHRA;  Surgeon: Luna Slater MD;  Location: [facility] OR;  Service: Urology;  Laterality: N/A;    ESOPHAGOGASTRODUODENOSCOPY N/A 2022    Procedure: EGD (ESOPHAGOGASTRODUODENOSCOPY);  Surgeon: Satinder Rdz MD;  Location: [facility] ENDO;  Service: Endoscopy;  Laterality: N/A;    HYSTERECTOMY      KNEE SURGERY Bilateral     TIBIA FRACTURE SURGERY Right     tibia/ fibula repaired with pins and screws    TOE SURGERY Left         Family History   Problem Relation Age of Onset    Hypertension Father     Hypertension Mother     Stroke Mother     Breast cancer Mother     Breast cancer Maternal Grandmother        Social History     Tobacco Use    Smoking status: Every Day     Packs/day: 1.00     Types: Cigarettes, Vaping with nicotine    Smokeless tobacco: Never   Substance Use Topics    Alcohol use: Yes     Comment: occ    Drug use: Yes     Types: Marijuana     Comment: last dose  "today.         Vitals:    10/14/22 0853   BP: 121/76   Pulse: 92   Resp: 12   Temp: 98.2 °F (36.8 °C)        Physical Exam:  /76 (BP Location: Right arm, Patient Position: Sitting, BP Method: Large (Automatic))   Pulse 92   Temp 98.2 °F (36.8 °C) (Temporal)   Resp 12   Ht 5' 6" (1.676 m)   Wt 79.5 kg (175 lb 4.3 oz)   LMP  (LMP Unknown)   SpO2 97%   BMI 28.29 kg/m²     Physical Exam  Constitutional:       Appearance: Normal appearance.   HENT:      Head: Normocephalic and atraumatic.      Nose: Nose normal.      Mouth/Throat:      Mouth: Mucous membranes are moist.      Pharynx: Oropharynx is clear.   Eyes:      Conjunctiva/sclera: Conjunctivae normal.   Cardiovascular:      Rate and Rhythm: Normal rate and regular rhythm.      Heart sounds: Normal heart sounds.   Pulmonary:      Effort: Pulmonary effort is normal.      Breath sounds: Normal breath sounds.   Abdominal:      General: Abdomen is flat. Bowel sounds are normal.      Palpations: Abdomen is soft.   Musculoskeletal:         General: Normal range of motion.      Cervical back: Normal range of motion and neck supple.   Skin:     General: Skin is warm and dry.   Neurological:      General: No focal deficit present.      Mental Status: She is alert and oriented to person, place, and time. Mental status is at baseline.   Psychiatric:         Mood and Affect: Mood normal.        Labs:  Lab Results   Component Value Date    WBC 11.47 10/13/2022    RBC 4.16 10/13/2022    HGB 12.6 10/13/2022    HCT 37.2 10/13/2022    MCV 89 10/13/2022    MCH 30.3 10/13/2022    MCHC 33.9 10/13/2022    RDW 13.5 10/13/2022     10/13/2022    MPV 9.0 (L) 10/13/2022    GRAN 6.1 10/13/2022    GRAN 53.3 10/13/2022    LYMPH 3.1 10/13/2022    LYMPH 26.7 10/13/2022    MONO 0.7 10/13/2022    MONO 6.4 10/13/2022    EOS 1.3 (H) 10/13/2022    BASO 0.16 10/13/2022    EOSINOPHIL 11.7 (H) 10/13/2022    BASOPHIL 1.4 10/13/2022     Sodium   Date Value Ref Range Status   10/13/2022 " 138 136 - 145 mmol/L Final   09/15/2017 143 134 - 144 mmol/L      Potassium   Date Value Ref Range Status   10/13/2022 3.3 (L) 3.5 - 5.1 mmol/L Final     Chloride   Date Value Ref Range Status   10/13/2022 109 95 - 110 mmol/L Final   09/15/2017 113 (H) 98 - 110 mmol/L      CO2   Date Value Ref Range Status   10/13/2022 24 23 - 29 mmol/L Final     Carbon Monoxide, Blood   Date Value Ref Range Status   02/17/2021 2 % Final     Comment:     -------------------REFERENCE VALUE--------------------------  0-2 Normal (Non-smoker) , < or = 9 Normal (Smoker), > or   = 20 (Toxic concentration)    Test Performed by:  [facility]  [address] [place] [address]  : Yannick Donis M.D. Ph.D.; CLIA# [id number]       Glucose   Date Value Ref Range Status   10/13/2022 103 70 - 110 mg/dL Final   09/15/2017 142 (H) 70 - 99 mg/dL      BUN   Date Value Ref Range Status   10/13/2022 10 6 - 20 mg/dL Final     Creatinine   Date Value Ref Range Status   10/13/2022 0.8 0.5 - 1.4 mg/dL Final   09/15/2017 0.59 (L) 0.60 - 1.40 mg/dL    11/05/2012 0.9 0.5 - 1.4 mg/dL Final     Calcium   Date Value Ref Range Status   10/13/2022 8.3 (L) 8.7 - 10.5 mg/dL Final   11/05/2012 9.5 8.7 - 10.5 mg/dL Final     Total Protein   Date Value Ref Range Status   10/13/2022 6.6 6.0 - 8.4 g/dL Final     Albumin   Date Value Ref Range Status   10/13/2022 3.5 3.5 - 5.2 g/dL Final   09/15/2017 3.5 3.1 - 4.7 g/dL      Total Bilirubin   Date Value Ref Range Status   10/13/2022 0.4 0.1 - 1.0 mg/dL Final     Comment:     For infants and newborns, interpretation of results should be based  on gestational age, weight and in agreement with clinical  observations.    Premature Infant recommended reference ranges:  Up to 24 hours.............<8.0 mg/dL  Up to 48 hours............<12.0 mg/dL  3-5 days..................<15.0 mg/dL  6-29 days.................<15.0 mg/dL       Alkaline Phosphatase   Date Value Ref  Range Status   10/13/2022 81 55 - 135 U/L Final   11/05/2012 70 55 - 135 U/L Final     AST   Date Value Ref Range Status   10/13/2022 17 10 - 40 U/L Final   11/05/2012 14 10 - 40 U/L Final     ALT   Date Value Ref Range Status   10/13/2022 17 10 - 44 U/L Final     Anion Gap   Date Value Ref Range Status   10/13/2022 5 (L) 8 - 16 mmol/L Final   11/05/2012 10 5 - 15 meq/L Final     eGFR if    Date Value Ref Range Status   01/21/2022 >60.0 >60 mL/min/1.73 m^2 Final     eGFR if non    Date Value Ref Range Status   01/21/2022 >60.0 >60 mL/min/1.73 m^2 Final     Comment:     Calculation used to obtain the estimated glomerular filtration  rate (eGFR) is the CKD-EPI equation.          A/P:    Eosinophilia  -eosinophilia itself is not very specific, and is difficult to pinpoint the reason  -I am not clear if it is causing her GI symptoms and night sweats   -I do note she is having an EGD, and if eosinophilia is causing her problems, then a biopsy should show tissue deposits.  In the meantime I will order some blood work to see if I can help pinpoint if these are an issue.  I will have her follow-up in 1 week      Aurash Khoobehi, MD  Hematology and Oncology

## 2022-10-15 LAB
ADV 40+41 DNA STL QL NAA+NON-PROBE: NOT DETECTED
ASTRO TYP 1-8 RNA STL QL NAA+NON-PROBE: NOT DETECTED
C CAYETANENSIS DNA STL QL NAA+NON-PROBE: NOT DETECTED
C COLI+JEJ+UPSA DNA STL QL NAA+NON-PROBE: NOT DETECTED
C DIF TOX TCDA+TCDB STL QL NAA+NON-PROBE: NOT DETECTED
CRYPTOSP DNA STL QL NAA+NON-PROBE: NOT DETECTED
E COLI O157 DNA STL QL NAA+NON-PROBE: NORMAL
E HISTOLYT DNA STL QL NAA+NON-PROBE: NOT DETECTED
EC STX1+STX2 GENES STL QL NAA+NON-PROBE: NOT DETECTED
ENTEROAGGREGATIVE E COLI: NOT DETECTED
ENTEROPATHOGENIC E COLI: NOT DETECTED
ETEC LTA+ST1A+ST1B TOX ST NAA+NON-PROBE: NOT DETECTED
G LAMBLIA DNA STL QL NAA+NON-PROBE: NOT DETECTED
GPP - SALMONELLA: NOT DETECTED
GPP - VIBRIO CHOLERA: NOT DETECTED
GPP - YERSINIA ENTEROCOLITICA: NOT DETECTED
NOROVIRUS GI+II RNA STL QL NAA+NON-PROBE: NOT DETECTED
PLESIOMONAS SHIGELLOIDES: NOT DETECTED
RVA RNA STL QL NAA+NON-PROBE: NOT DETECTED
SAPO I+II+IV+V RNA STL QL NAA+NON-PROBE: NOT DETECTED
SHIGELLA SP+EIEC IPAH ST NAA+NON-PROBE: NOT DETECTED
VIBRIO: NOT DETECTED

## 2022-10-16 ENCOUNTER — HOSPITAL ENCOUNTER (EMERGENCY)
Facility: HOSPITAL | Age: 47
Discharge: HOME | End: 2022-10-16
Attending: EMERGENCY MEDICINE
Payer: MEDICARE

## 2022-10-16 VITALS
HEART RATE: 95 BPM | RESPIRATION RATE: 20 BRPM | TEMPERATURE: 98 F | DIASTOLIC BLOOD PRESSURE: 78 MMHG | SYSTOLIC BLOOD PRESSURE: 111 MMHG | OXYGEN SATURATION: 96 % | WEIGHT: 176 LBS | BODY MASS INDEX: 28.28 KG/M2 | HEIGHT: 66 IN

## 2022-10-16 DIAGNOSIS — R10.9 ABDOMINAL PAIN: ICD-10-CM

## 2022-10-16 DIAGNOSIS — Z87.898 HISTORY OF DIARRHEA: ICD-10-CM

## 2022-10-16 DIAGNOSIS — R10.9 CHRONIC ABDOMINAL PAIN: Primary | ICD-10-CM

## 2022-10-16 DIAGNOSIS — R73.9 HYPERGLYCEMIA: ICD-10-CM

## 2022-10-16 DIAGNOSIS — E87.6 HYPOKALEMIA: ICD-10-CM

## 2022-10-16 DIAGNOSIS — Z87.898 HISTORY OF VOMITING: ICD-10-CM

## 2022-10-16 DIAGNOSIS — G89.29 CHRONIC ABDOMINAL PAIN: Primary | ICD-10-CM

## 2022-10-16 LAB
ALBUMIN SERPL BCP-MCNC: 3.5 G/DL (ref 3.5–5.2)
ALP SERPL-CCNC: 88 U/L (ref 55–135)
ALT SERPL W/O P-5'-P-CCNC: 21 U/L (ref 10–44)
ANION GAP SERPL CALC-SCNC: 8 MMOL/L (ref 8–16)
AST SERPL-CCNC: 19 U/L (ref 10–40)
BASOPHILS # BLD AUTO: 0.1 K/UL (ref 0–0.2)
BASOPHILS NFR BLD: 1.3 % (ref 0–1.9)
BILIRUB SERPL-MCNC: 0.5 MG/DL (ref 0.1–1)
BILIRUB UR QL STRIP: NEGATIVE
BUN SERPL-MCNC: 20 MG/DL (ref 6–20)
CALCIUM SERPL-MCNC: 8.9 MG/DL (ref 8.7–10.5)
CHLORIDE SERPL-SCNC: 109 MMOL/L (ref 95–110)
CLARITY UR: CLEAR
CO2 SERPL-SCNC: 23 MMOL/L (ref 23–29)
COLOR UR: YELLOW
CREAT SERPL-MCNC: 0.9 MG/DL (ref 0.5–1.4)
DIFFERENTIAL METHOD: ABNORMAL
EOSINOPHIL # BLD AUTO: 1 K/UL (ref 0–0.5)
EOSINOPHIL NFR BLD: 12.2 % (ref 0–8)
ERYTHROCYTE [DISTWIDTH] IN BLOOD BY AUTOMATED COUNT: 13.4 % (ref 11.5–14.5)
EST. GFR  (NO RACE VARIABLE): >60 ML/MIN/1.73 M^2
GLUCOSE SERPL-MCNC: 172 MG/DL (ref 70–110)
GLUCOSE UR QL STRIP: NEGATIVE
HCT VFR BLD AUTO: 41.3 % (ref 37–48.5)
HGB BLD-MCNC: 13.5 G/DL (ref 12–16)
HGB UR QL STRIP: NEGATIVE
IMM GRANULOCYTES # BLD AUTO: 0.04 K/UL (ref 0–0.04)
IMM GRANULOCYTES NFR BLD AUTO: 0.5 % (ref 0–0.5)
KETONES UR QL STRIP: NEGATIVE
LEUKOCYTE ESTERASE UR QL STRIP: NEGATIVE
LIPASE SERPL-CCNC: 33 U/L (ref 4–60)
LYMPHOCYTES # BLD AUTO: 2.3 K/UL (ref 1–4.8)
LYMPHOCYTES NFR BLD: 28.8 % (ref 18–48)
MCH RBC QN AUTO: 29.7 PG (ref 27–31)
MCHC RBC AUTO-ENTMCNC: 32.7 G/DL (ref 32–36)
MCV RBC AUTO: 91 FL (ref 82–98)
MONOCYTES # BLD AUTO: 0.5 K/UL (ref 0.3–1)
MONOCYTES NFR BLD: 5.9 % (ref 4–15)
NEUTROPHILS # BLD AUTO: 4 K/UL (ref 1.8–7.7)
NEUTROPHILS NFR BLD: 51.3 % (ref 38–73)
NITRITE UR QL STRIP: NEGATIVE
NRBC BLD-RTO: 0 /100 WBC
PH UR STRIP: 6 [PH] (ref 5–8)
PLATELET # BLD AUTO: 383 K/UL (ref 150–450)
PMV BLD AUTO: 9.3 FL (ref 9.2–12.9)
POTASSIUM SERPL-SCNC: 3.3 MMOL/L (ref 3.5–5.1)
PROT SERPL-MCNC: 6.8 G/DL (ref 6–8.4)
PROT UR QL STRIP: NEGATIVE
RBC # BLD AUTO: 4.55 M/UL (ref 4–5.4)
SARS-COV-2 RDRP RESP QL NAA+PROBE: NEGATIVE
SODIUM SERPL-SCNC: 140 MMOL/L (ref 136–145)
SP GR UR STRIP: 1.01 (ref 1–1.03)
URN SPEC COLLECT METH UR: NORMAL
UROBILINOGEN UR STRIP-ACNC: NEGATIVE EU/DL
WBC # BLD AUTO: 7.81 K/UL (ref 3.9–12.7)

## 2022-10-16 PROCEDURE — 83690 ASSAY OF LIPASE: CPT | Performed by: EMERGENCY MEDICINE

## 2022-10-16 PROCEDURE — U0002 COVID-19 LAB TEST NON-CDC: HCPCS | Performed by: EMERGENCY MEDICINE

## 2022-10-16 PROCEDURE — 81003 URINALYSIS AUTO W/O SCOPE: CPT | Performed by: EMERGENCY MEDICINE

## 2022-10-16 PROCEDURE — 85025 COMPLETE CBC W/AUTO DIFF WBC: CPT | Performed by: EMERGENCY MEDICINE

## 2022-10-16 PROCEDURE — 99284 EMERGENCY DEPT VISIT MOD MDM: CPT | Mod: 25

## 2022-10-16 PROCEDURE — 63600175 PHARM REV CODE 636 W HCPCS: Performed by: EMERGENCY MEDICINE

## 2022-10-16 PROCEDURE — 80053 COMPREHEN METABOLIC PANEL: CPT | Performed by: EMERGENCY MEDICINE

## 2022-10-16 PROCEDURE — 96375 TX/PRO/DX INJ NEW DRUG ADDON: CPT

## 2022-10-16 PROCEDURE — 96374 THER/PROPH/DIAG INJ IV PUSH: CPT

## 2022-10-16 RX ORDER — ONDANSETRON 2 MG/ML
4 INJECTION INTRAMUSCULAR; INTRAVENOUS
Status: COMPLETED | OUTPATIENT
Start: 2022-10-16 | End: 2022-10-16

## 2022-10-16 RX ORDER — HYOSCYAMINE SULFATE 0.5 MG/ML
0.25 INJECTION, SOLUTION SUBCUTANEOUS
Status: COMPLETED | OUTPATIENT
Start: 2022-10-16 | End: 2022-10-16

## 2022-10-16 RX ORDER — HYOSCYAMINE SULFATE 0.12 MG/1
0.25 TABLET SUBLINGUAL EVERY 4 HOURS PRN
Qty: 20 TABLET | Refills: 0 | Status: SHIPPED | OUTPATIENT
Start: 2022-10-16

## 2022-10-16 RX ORDER — PROCHLORPERAZINE MALEATE 10 MG
10 TABLET ORAL 3 TIMES DAILY PRN
Qty: 15 TABLET | Refills: 0 | Status: SHIPPED | OUTPATIENT
Start: 2022-10-16

## 2022-10-16 RX ORDER — PROCHLORPERAZINE EDISYLATE 5 MG/ML
10 INJECTION INTRAMUSCULAR; INTRAVENOUS
Status: COMPLETED | OUTPATIENT
Start: 2022-10-16 | End: 2022-10-16

## 2022-10-16 RX ORDER — POTASSIUM CHLORIDE 750 MG/1
10 TABLET, EXTENDED RELEASE ORAL 2 TIMES DAILY
Qty: 10 TABLET | Refills: 0 | Status: SHIPPED | OUTPATIENT
Start: 2022-10-16 | End: 2022-11-04

## 2022-10-16 RX ADMIN — PROCHLORPERAZINE EDISYLATE 10 MG: 5 INJECTION INTRAMUSCULAR; INTRAVENOUS at 09:10

## 2022-10-16 RX ADMIN — HYOSCYAMINE SULFATE 0.25 MG: 0.5 INJECTION, SOLUTION SUBCUTANEOUS at 08:10

## 2022-10-16 RX ADMIN — ONDANSETRON 4 MG: 2 INJECTION INTRAMUSCULAR; INTRAVENOUS at 08:10

## 2022-10-16 NOTE — ED PROVIDER NOTES
Encounter Date: 10/16/2022       History     Chief Complaint   Patient presents with    Abdominal Pain     SINCE SEPT    Diarrhea    Vomiting     47-year-old female who has a history of anxiety, bipolar disorder, PTSD, eosinophilia and, chronic abdominal pain, return to the emergency room for complaints of diffuse abdominal pain that radiates into her flanks.  The patient states that she had several episodes of diarrhea and vomiting.  She also admits that she has had incontinence.  Complains of feeling generally weak and states she had a low-grade temp 100°.  Patient describes the pain as sharp and cramping in nature.  She admits that symptoms of somewhat better sleeping and worsened with eating.  She has seen Dr. Peacock in the past and is scheduled for an EGD on the  of this month.  No complaints of any dysuria hematuria.    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    Endometriosis     Headache     Heartburn     Hypotension     Infertility, female     Insomnia     Migraine headache     PTSD (post-traumatic stress disorder)     Trauma      Past Surgical History:   Procedure Laterality Date    APPENDECTOMY      BLADDER SUSPENSION      CERVICAL FUSION       SECTION      CHOLECYSTECTOMY      CYSTOSCOPY WITH BIOPSY OF BLADDER N/A 2018    Procedure: CYSTOSCOPY, WITH BLADDER BIOPSY, WITH FULGURATION IF INDICATED;  Surgeon: Luna Slater MD;  Location: [facility];  Service:  Urology;  Laterality: N/A;    CYSTOSCOPY WITH HYDRODISTENSION OF BLADDER N/A 12/3/2019    Procedure: CYSTOSCOPY, WITH BLADDER HYDRODISTENSION;  Surgeon: Gumaro Mcgovern MD;  Location: [facility] OR;  Service: OB/GYN;  Laterality: N/A;    DILATION OF URETHRA N/A 12/12/2018    Procedure: DILATION, URETHRA;  Surgeon: Luna Slater MD;  Location: [facility] OR;  Service: Urology;  Laterality: N/A;    ESOPHAGOGASTRODUODENOSCOPY N/A 1/21/2022    Procedure: EGD (ESOPHAGOGASTRODUODENOSCOPY);  Surgeon: Satinder Rdz MD;  Location: [facility];  Service: Endoscopy;  Laterality: N/A;    HYSTERECTOMY      KNEE SURGERY Bilateral     TIBIA FRACTURE SURGERY Right     tibia/ fibula repaired with pins and screws    TOE SURGERY Left      Family History   Problem Relation Age of Onset    Hypertension Father     Hypertension Mother     Stroke Mother     Breast cancer Mother     Breast cancer Maternal Grandmother      Social History     Tobacco Use    Smoking status: Every Day     Packs/day: 1.00     Types: Cigarettes, Vaping with nicotine    Smokeless tobacco: Never   Substance Use Topics    Alcohol use: Yes     Comment: occ    Drug use: Yes     Types: Marijuana     Comment: last dose today.     Review of Systems   Constitutional:  Positive for appetite change. Negative for chills, diaphoresis and fever.   HENT: Negative.  Negative for sore throat.    Respiratory: Negative.  Negative for shortness of breath.    Cardiovascular: Negative.  Negative for chest pain.   Gastrointestinal:  Positive for abdominal pain, diarrhea, nausea and vomiting.   Genitourinary:  Negative for difficulty urinating.   Musculoskeletal:  Negative for back pain, neck pain and neck stiffness.   Skin: Negative.  Negative for rash.   Neurological:  Negative for dizziness and weakness.   Hematological:  Does not bruise/bleed easily.   All other systems reviewed and are negative.    Physical Exam     Initial Vitals [10/16/22 0809]   BP Pulse Resp Temp SpO2   121/67  (!) 114 20 98.2 °F (36.8 °C) (!) 94 %      MAP       --         Physical Exam    Vitals reviewed.  Constitutional: She appears well-developed and well-nourished. She is not diaphoretic. No distress.   HENT:   Head: Normocephalic and atraumatic.   Nose: Nose normal.   Mouth/Throat: Oropharynx is clear and moist. No oropharyngeal exudate.   Eyes: Conjunctivae are normal. Pupils are equal, round, and reactive to light.   Neck: Neck supple. No JVD present.   Normal range of motion.  Cardiovascular:  Normal rate, regular rhythm, normal heart sounds and intact distal pulses.     Exam reveals no gallop and no friction rub.       No murmur heard.  Pulmonary/Chest: Breath sounds normal. No respiratory distress. She has no wheezes. She has no rhonchi. She has no rales. She exhibits no tenderness.   Abdominal: Abdomen is soft. Bowel sounds are normal. She exhibits no distension. There is abdominal tenderness. There is no rebound and no guarding.   Musculoskeletal:         General: No tenderness or edema. Normal range of motion.      Cervical back: Normal range of motion and neck supple.     Lymphadenopathy:     She has no cervical adenopathy.   Neurological: She is alert and oriented to person, place, and time. She has normal strength. GCS score is 15. GCS eye subscore is 4. GCS verbal subscore is 5. GCS motor subscore is 6.   Skin: Skin is warm and dry. Capillary refill takes less than 2 seconds. No rash noted. No erythema. No pallor.   Psychiatric: She has a normal mood and affect. Her behavior is normal. Judgment and thought content normal.       ED Course   Procedures  Labs Reviewed   CBC W/ AUTO DIFFERENTIAL - Abnormal; Notable for the following components:       Result Value    Eos # 1.0 (*)     Eosinophil % 12.2 (*)     All other components within normal limits    Narrative:     For upper or mid abdominal pain.   COMPREHENSIVE METABOLIC PANEL - Abnormal; Notable for the following components:    Potassium 3.3 (*)      Glucose 172 (*)     All other components within normal limits    Narrative:     For upper or mid abdominal pain.   CULTURE, STOOL   URINALYSIS, REFLEX TO URINE CULTURE    Narrative:     In and Out Cath as needed it patient unable to void  Specimen Source->Urine   LIPASE    Narrative:     For upper or mid abdominal pain.   SARS-COV-2 RNA AMPLIFICATION, QUAL   STOOL EXAM-OVA,CYSTS,PARASITES   WBC, STOOL   OCCULT BLOOD X 1, STOOL          Imaging Results              X-Ray Abdomen Flat And Erect (Final result)  Result time 10/16/22 09:46:23      Final result by Papo Alvarado MD (10/16/22 09:46:23)                   Impression:      No acute intra-abdominal abnormality.      Electronically signed by: Papo Alvarado MD  Date:    10/16/2022  Time:    09:46               Narrative:    EXAMINATION:  XR ABDOMEN FLAT AND ERECT    CLINICAL HISTORY:  Unspecified abdominal pain    FINDINGS:  Supine and upright abdomen show no pneumoperitoneum.  Visualized lung bases are clear.    Right upper quadrant surgical clips indicate cholecystectomy.  Bowel gas pattern is nonobstructive.  Few pelvic phleboliths are scattered.    No acute osseous abnormality.                                       Medications   hyoscyamine injection 0.25 mg (0.25 mg Intravenous Given 10/16/22 0839)   ondansetron injection 4 mg (4 mg Intravenous Given 10/16/22 0839)   prochlorperazine injection Soln 10 mg (10 mg Intravenous Given 10/16/22 0938)                Attending Attestation:             Attending ED Notes:   This 47-year-old female who has a history of chronic abdominal pain of undetermined etiology, again presented to the ED for similar complaints.  Patient has had workups in the past has been seen Dr. Peacock who has been apparently unable to identify any etiology to her symptoms.  The patient is had 3 CT scans which she reports were all unremarkable.  She admitted to having had numerous episodes of vomiting and diarrhea but had none while in the ED.   She did receive initially Zofran and later Compazine for nausea.  The patient additionally received Levsin for complaints of cramping.  She again attempted on more than 1 occasion to secure other analgesics and advised her that would not recommend using opiates for unclear abdominal pain at this time which is been of such chronic nature.  She is advised to follow-up with Dr. Peacock call his office tomorrow.  Today's evaluation included unremarkable flat and erect abdominal film.  CBC had a white count of 7.8 unremarkable H&H.  Chemistries only remarkable for an elevated blood sugar 172 and a potassium of 3.3.  The urinalysis is negative COVID screen is negative.                 Clinical Impression:   Final diagnoses:  [R10.9] Abdominal pain  [R10.9, G89.29] Chronic abdominal pain (Primary)  [R73.9] Hyperglycemia  [E87.6] Hypokalemia  [Z87.898] History of vomiting  [Z87.898] History of diarrhea        ED Disposition Condition    Discharge Stable          ED Prescriptions       Medication Sig Dispense Start Date End Date Auth. Provider    prochlorperazine (COMPAZINE) 10 MG tablet Take 1 tablet (10 mg total) by mouth 3 (three) times daily as needed. 15 tablet 10/16/2022 -- Austin Burgos Jr., MD    hyoscyamine (LEVSIN/SL) 0.125 mg Subl Place 2 tablets (0.25 mg total) under the tongue every 4 (four) hours as needed. 20 tablet 10/16/2022 -- Austin Burgos Jr., MD    potassium chloride (KLOR-CON) 10 MEQ TbSR Take 1 tablet (10 mEq total) by mouth 2 (two) times daily. 10 tablet 10/16/2022 -- Austin Burgos Jr., MD          Follow-up Information       Follow up With Specialties Details Why Contact Info    Jonathan Peacock MD Gastroenterology Schedule an appointment as soon as possible for a visit  Call tomorrow for an appointment [id number]   [place] [address]               Austin Burgos Jr., MD  10/16/22 7428

## 2022-10-16 NOTE — DISCHARGE INSTRUCTIONS
Contact your gastroenterologist tomorrow morning.  Take Levsin for cramping and Compazine for nausea.  Imodium if diarrhea recurs

## 2022-10-17 LAB
IGG SERPL-MCNC: 785 MG/DL (ref 586–1602)
IGG1 SER-MCNC: 447 MG/DL (ref 248–810)
IGG2 SER-MCNC: 154 MG/DL (ref 130–555)
IGG3 SER-MCNC: 26 MG/DL (ref 15–102)
IGG4 SER-MCNC: 120 MG/DL (ref 2–96)
TRYPTASE SERPL-MCNC: 12 UG/L (ref 2.2–13.2)

## 2022-10-20 ENCOUNTER — OFFICE VISIT (OUTPATIENT)
Dept: HEMATOLOGY/ONCOLOGY | Facility: CLINIC | Age: 47
End: 2022-10-20
Payer: MEDICARE

## 2022-10-20 ENCOUNTER — TELEPHONE (OUTPATIENT)
Dept: HEMATOLOGY/ONCOLOGY | Facility: CLINIC | Age: 47
End: 2022-10-20
Payer: MEDICARE

## 2022-10-20 VITALS
OXYGEN SATURATION: 95 % | BODY MASS INDEX: 27.92 KG/M2 | RESPIRATION RATE: 12 BRPM | SYSTOLIC BLOOD PRESSURE: 88 MMHG | TEMPERATURE: 99 F | HEART RATE: 94 BPM | WEIGHT: 173.75 LBS | DIASTOLIC BLOOD PRESSURE: 51 MMHG | HEIGHT: 66 IN

## 2022-10-20 DIAGNOSIS — D72.19 OTHER EOSINOPHILIA: Primary | ICD-10-CM

## 2022-10-20 DIAGNOSIS — E86.1 HYPOTENSION DUE TO HYPOVOLEMIA: ICD-10-CM

## 2022-10-20 PROCEDURE — 1111F DSCHRG MED/CURRENT MED MERGE: CPT | Mod: CPTII,S$GLB,, | Performed by: INTERNAL MEDICINE

## 2022-10-20 PROCEDURE — 3074F PR MOST RECENT SYSTOLIC BLOOD PRESSURE < 130 MM HG: ICD-10-PCS | Mod: CPTII,S$GLB,, | Performed by: INTERNAL MEDICINE

## 2022-10-20 PROCEDURE — 99214 PR OFFICE/OUTPT VISIT, EST, LEVL IV, 30-39 MIN: ICD-10-PCS | Mod: S$GLB,,, | Performed by: INTERNAL MEDICINE

## 2022-10-20 PROCEDURE — 3078F DIAST BP <80 MM HG: CPT | Mod: CPTII,S$GLB,, | Performed by: INTERNAL MEDICINE

## 2022-10-20 PROCEDURE — 99999 PR PBB SHADOW E&M-EST. PATIENT-LVL IV: CPT | Mod: PBBFAC,,, | Performed by: INTERNAL MEDICINE

## 2022-10-20 PROCEDURE — 1160F RVW MEDS BY RX/DR IN RCRD: CPT | Mod: CPTII,S$GLB,, | Performed by: INTERNAL MEDICINE

## 2022-10-20 PROCEDURE — 99214 OFFICE O/P EST MOD 30 MIN: CPT | Mod: S$GLB,,, | Performed by: INTERNAL MEDICINE

## 2022-10-20 PROCEDURE — 1159F MED LIST DOCD IN RCRD: CPT | Mod: CPTII,S$GLB,, | Performed by: INTERNAL MEDICINE

## 2022-10-20 PROCEDURE — 3078F PR MOST RECENT DIASTOLIC BLOOD PRESSURE < 80 MM HG: ICD-10-PCS | Mod: CPTII,S$GLB,, | Performed by: INTERNAL MEDICINE

## 2022-10-20 PROCEDURE — 1160F PR REVIEW ALL MEDS BY PRESCRIBER/CLIN PHARMACIST DOCUMENTED: ICD-10-PCS | Mod: CPTII,S$GLB,, | Performed by: INTERNAL MEDICINE

## 2022-10-20 PROCEDURE — 1159F PR MEDICATION LIST DOCUMENTED IN MEDICAL RECORD: ICD-10-PCS | Mod: CPTII,S$GLB,, | Performed by: INTERNAL MEDICINE

## 2022-10-20 PROCEDURE — 1111F PR DISCHARGE MEDS RECONCILED W/ CURRENT OUTPATIENT MED LIST: ICD-10-PCS | Mod: CPTII,S$GLB,, | Performed by: INTERNAL MEDICINE

## 2022-10-20 PROCEDURE — 3074F SYST BP LT 130 MM HG: CPT | Mod: CPTII,S$GLB,, | Performed by: INTERNAL MEDICINE

## 2022-10-20 PROCEDURE — 99999 PR PBB SHADOW E&M-EST. PATIENT-LVL IV: ICD-10-PCS | Mod: PBBFAC,,, | Performed by: INTERNAL MEDICINE

## 2022-10-20 RX ORDER — SODIUM CHLORIDE 0.9 % (FLUSH) 0.9 %
10 SYRINGE (ML) INJECTION
Status: CANCELLED | OUTPATIENT
Start: 2022-10-20

## 2022-10-20 RX ORDER — HEPARIN 100 UNIT/ML
500 SYRINGE INTRAVENOUS
Status: CANCELLED | OUTPATIENT
Start: 2022-10-20

## 2022-10-20 NOTE — PROGRESS NOTES
Service Date:  10/20/22    Chief Complaint: Eosinophilia    Rosalina Jacobo is a 47 y.o. female here to follow-up on blood work.  Patient is complaining of abdominal pain.  She is lying in the exam bed.  Denies any dizziness or lightheadedness.  Denies any chest pain.    Review of Systems   Constitutional: Negative.    HENT: Negative.     Eyes: Negative.    Respiratory: Negative.     Cardiovascular: Negative.    Gastrointestinal: Negative.    Endocrine: Negative.    Genitourinary: Negative.    Musculoskeletal: Negative.    Integumentary:  Negative.   Neurological: Negative.    Hematological: Negative.    Psychiatric/Behavioral: Negative.        Current Outpatient Medications   Medication Instructions    busPIRone (BUSPAR) 10 mg, Oral, 2 times daily    chlorproMAZINE (THORAZINE) 50 mg, Oral, 3 times daily    diazePAM (VALIUM) 10 MG Tab take 1 tablet by mouth twice a day as needed    HYDROcodone-acetaminophen (NORCO)  mg per tablet take 1 tablet by mouth every 8 hours as needed    HYDROcodone-acetaminophen (NORCO) 7.5-325 mg per tablet take 1 tablet by mouth every 6 hours    hyoscyamine (LEVSIN/SL) 0.25 mg, Sublingual, Every 4 hours PRN    magnesium oxide (MAG-OX) 400 mg, Oral, 2 times daily    pantoprazole (PROTONIX) 40 MG tablet Take 1 tablet (40 mg total) by mouth once daily.    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    Endometriosis     Headache     Heartburn     Hypotension     Infertility, female     Insomnia     Migraine headache     PTSD  "(post-traumatic stress disorder)     Trauma         Past Surgical History:   Procedure Laterality Date    APPENDECTOMY      BLADDER SUSPENSION      CERVICAL FUSION       SECTION      CHOLECYSTECTOMY      CYSTOSCOPY WITH BIOPSY OF BLADDER N/A 2018    Procedure: CYSTOSCOPY, WITH BLADDER BIOPSY, WITH FULGURATION IF INDICATED;  Surgeon: Luna Slater MD;  Location: [facility] OR;  Service: Urology;  Laterality: N/A;    CYSTOSCOPY WITH HYDRODISTENSION OF BLADDER N/A 12/3/2019    Procedure: CYSTOSCOPY, WITH BLADDER HYDRODISTENSION;  Surgeon: Gumaro Mcgovern MD;  Location: [facility] OR;  Service: OB/GYN;  Laterality: N/A;    DILATION OF URETHRA N/A 2018    Procedure: DILATION, URETHRA;  Surgeon: Luna Slater MD;  Location: [facility] OR;  Service: Urology;  Laterality: N/A;    ESOPHAGOGASTRODUODENOSCOPY N/A 2022    Procedure: EGD (ESOPHAGOGASTRODUODENOSCOPY);  Surgeon: Satinder Rdz MD;  Location: [facility];  Service: Endoscopy;  Laterality: N/A;    HYSTERECTOMY      KNEE SURGERY Bilateral     TIBIA FRACTURE SURGERY Right     tibia/ fibula repaired with pins and screws    TOE SURGERY Left         Family History   Problem Relation Age of Onset    Hypertension Father     Hypertension Mother     Stroke Mother     Breast cancer Mother     Breast cancer Maternal Grandmother        Social History     Tobacco Use    Smoking status: Every Day     Packs/day: 1.00     Types: Cigarettes, Vaping with nicotine    Smokeless tobacco: Never   Substance Use Topics    Alcohol use: Yes     Comment: occ    Drug use: Yes     Types: Marijuana     Comment: last dose today.         Vitals:    10/20/22 1159   BP: (!) 88/51   Pulse: 94   Resp: 12   Temp: 98.8 °F (37.1 °C)        Physical Exam:  BP (!) 88/51 (BP Location: Right arm, Patient Position: Lying, BP Method: Medium (Automatic))   Pulse 94   Temp 98.8 °F (37.1 °C) (Temporal)   Resp 12   Ht 5' 6" (1.676 m)   Wt 78.8 kg (173 lb 11.6 oz)   LMP  (LMP " Unknown)   SpO2 95%   BMI 28.04 kg/m²     Physical Exam  Constitutional:       Appearance: Normal appearance.   HENT:      Head: Normocephalic and atraumatic.      Nose: Nose normal.      Mouth/Throat:      Mouth: Mucous membranes are moist.      Pharynx: Oropharynx is clear.   Eyes:      Conjunctiva/sclera: Conjunctivae normal.   Cardiovascular:      Rate and Rhythm: Normal rate and regular rhythm.      Heart sounds: Normal heart sounds.   Pulmonary:      Effort: Pulmonary effort is normal.      Breath sounds: Normal breath sounds.   Abdominal:      General: Abdomen is flat. Bowel sounds are normal.      Palpations: Abdomen is soft.   Musculoskeletal:         General: Normal range of motion.      Cervical back: Normal range of motion and neck supple.   Skin:     General: Skin is warm and dry.   Neurological:      General: No focal deficit present.      Mental Status: She is alert and oriented to person, place, and time. Mental status is at baseline.   Psychiatric:         Mood and Affect: Mood normal.        Labs:  Lab Results   Component Value Date    WBC 7.81 10/16/2022    RBC 4.55 10/16/2022    HGB 13.5 10/16/2022    HCT 41.3 10/16/2022    MCV 91 10/16/2022    MCH 29.7 10/16/2022    MCHC 32.7 10/16/2022    RDW 13.4 10/16/2022     10/16/2022    MPV 9.3 10/16/2022    GRAN 4.0 10/16/2022    GRAN 51.3 10/16/2022    LYMPH 2.3 10/16/2022    LYMPH 28.8 10/16/2022    MONO 0.5 10/16/2022    MONO 5.9 10/16/2022    EOS 1.0 (H) 10/16/2022    BASO 0.10 10/16/2022    EOSINOPHIL 12.2 (H) 10/16/2022    BASOPHIL 1.3 10/16/2022     Sodium   Date Value Ref Range Status   10/16/2022 140 136 - 145 mmol/L Final   09/15/2017 143 134 - 144 mmol/L      Potassium   Date Value Ref Range Status   10/16/2022 3.3 (L) 3.5 - 5.1 mmol/L Final     Chloride   Date Value Ref Range Status   10/16/2022 109 95 - 110 mmol/L Final   09/15/2017 113 (H) 98 - 110 mmol/L      CO2   Date Value Ref Range Status   10/16/2022 23 23 - 29 mmol/L Final      Carbon Monoxide, Blood   Date Value Ref Range Status   02/17/2021 2 % Final     Comment:     -------------------REFERENCE VALUE--------------------------  0-2 Normal (Non-smoker) , < or = 9 Normal (Smoker), > or   = 20 (Toxic concentration)    Test Performed by:  [facility]  [address] [place] [address]  : Yannick Donis M.D. Ph.D.; CLIA# [id number]       Glucose   Date Value Ref Range Status   10/16/2022 172 (H) 70 - 110 mg/dL Final   09/15/2017 142 (H) 70 - 99 mg/dL      BUN   Date Value Ref Range Status   10/16/2022 20 6 - 20 mg/dL Final     Creatinine   Date Value Ref Range Status   10/16/2022 0.9 0.5 - 1.4 mg/dL Final   09/15/2017 0.59 (L) 0.60 - 1.40 mg/dL    11/05/2012 0.9 0.5 - 1.4 mg/dL Final     Calcium   Date Value Ref Range Status   10/16/2022 8.9 8.7 - 10.5 mg/dL Final   11/05/2012 9.5 8.7 - 10.5 mg/dL Final     Total Protein   Date Value Ref Range Status   10/16/2022 6.8 6.0 - 8.4 g/dL Final     Albumin   Date Value Ref Range Status   10/16/2022 3.5 3.5 - 5.2 g/dL Final   09/15/2017 3.5 3.1 - 4.7 g/dL      Total Bilirubin   Date Value Ref Range Status   10/16/2022 0.5 0.1 - 1.0 mg/dL Final     Comment:     For infants and newborns, interpretation of results should be based  on gestational age, weight and in agreement with clinical  observations.    Premature Infant recommended reference ranges:  Up to 24 hours.............<8.0 mg/dL  Up to 48 hours............<12.0 mg/dL  3-5 days..................<15.0 mg/dL  6-29 days.................<15.0 mg/dL       Alkaline Phosphatase   Date Value Ref Range Status   10/16/2022 88 55 - 135 U/L Final   11/05/2012 70 55 - 135 U/L Final     AST   Date Value Ref Range Status   10/16/2022 19 10 - 40 U/L Final   11/05/2012 14 10 - 40 U/L Final     ALT   Date Value Ref Range Status   10/16/2022 21 10 - 44 U/L Final     Anion Gap   Date Value Ref Range Status   10/16/2022 8 8 - 16 mmol/L Final    11/05/2012 10 5 - 15 meq/L Final     eGFR if    Date Value Ref Range Status   01/21/2022 >60.0 >60 mL/min/1.73 m^2 Final     eGFR if non    Date Value Ref Range Status   01/21/2022 >60.0 >60 mL/min/1.73 m^2 Final     Comment:     Calculation used to obtain the estimated glomerular filtration  rate (eGFR) is the CKD-EPI equation.          A/P:    Eosinophilia  -eosinophilia itself is not very specific, and is difficult to pinpoint the reason  -I am not clear if it is causing her GI symptoms and night sweats although now I think unlikely given that the workup has been negative so far.  I will order NGS on her peripheral blood and if that is negative, then she no longer needs to be seen for this.    Hypotension  -due to lack of oral intake  -will give IV NS today      Aurash Khoobehi, MD  Hematology and Oncology

## 2022-10-21 LAB — METHLYMALONIC ACID: 148 NMOL/L (ref 0–378)

## 2022-10-24 ENCOUNTER — LAB VISIT (OUTPATIENT)
Dept: LAB | Facility: HOSPITAL | Age: 47
End: 2022-10-24
Attending: INTERNAL MEDICINE
Payer: MEDICARE

## 2022-10-24 DIAGNOSIS — Z12.31 ENCOUNTER FOR SCREENING MAMMOGRAM FOR MALIGNANT NEOPLASM OF BREAST: ICD-10-CM

## 2022-10-24 DIAGNOSIS — R10.9 UNSPECIFIED ABDOMINAL PAIN: Primary | ICD-10-CM

## 2022-10-24 DIAGNOSIS — D72.19 OTHER EOSINOPHILIA: ICD-10-CM

## 2022-10-24 PROCEDURE — 30000890 HC MISC. SEND OUT TEST

## 2022-10-24 PROCEDURE — 81270 JAK2 GENE: CPT | Mod: 91,GZ | Performed by: INTERNAL MEDICINE

## 2022-10-24 PROCEDURE — 81450 HL NEO GSAP 5-50DNA/DNA&RNA: CPT

## 2022-10-24 PROCEDURE — 30000890 LABCORP MISCELLANEOUS TEST: Mod: 91,GZ | Performed by: INTERNAL MEDICINE

## 2022-10-25 ENCOUNTER — TELEPHONE (OUTPATIENT)
Dept: HEMATOLOGY/ONCOLOGY | Facility: CLINIC | Age: 47
End: 2022-10-25
Payer: MEDICARE

## 2022-10-25 LAB — IGE: 4497 IU/ML (ref 6–495)

## 2022-10-25 NOTE — TELEPHONE ENCOUNTER
----- Message from Ebony Brice sent at 10/25/2022  2:58 PM CDT -----  Type:  Test Results    Who Called:  pt  Name of Test (Lab/Mammo/Etc):  Lab  Date of Test:  10/24  Ordering Provider:  Khoobehi  Where the test was performed:  [facility]  Best Call Back Number:  [phone] (home)     Additional Information:  please call and advise--thank you

## 2022-10-26 ENCOUNTER — TELEPHONE (OUTPATIENT)
Dept: HEMATOLOGY/ONCOLOGY | Facility: CLINIC | Age: 47
End: 2022-10-26
Payer: MEDICARE

## 2022-10-26 NOTE — TELEPHONE ENCOUNTER
----- Message from Laci Ohara sent at 10/26/2022 11:45 AM CDT -----  Regarding: Lab results  Good Morning!      Patient came in clinic this morning stating that she has not received a return phone call regarding her lab results. She said she spoke with someone here who said the NP was gone for the day. She said she was expecting a phone call back, but had not received one. Please call patient at [phone].  Thank you,  Laci Ohara

## 2022-10-27 ENCOUNTER — HOSPITAL ENCOUNTER (OUTPATIENT)
Dept: RADIOLOGY | Facility: HOSPITAL | Age: 47
Discharge: HOME | End: 2022-10-27
Attending: INTERNAL MEDICINE
Payer: MEDICARE

## 2022-10-27 DIAGNOSIS — R19.7 DIARRHEA: ICD-10-CM

## 2022-10-27 DIAGNOSIS — K52.9 NONINFECTIOUS GASTROENTERITIS, UNSPECIFIED TYPE: ICD-10-CM

## 2022-10-27 PROCEDURE — 25500020 PHARM REV CODE 255: Performed by: INTERNAL MEDICINE

## 2022-10-27 PROCEDURE — A9698 NON-RAD CONTRAST MATERIALNOC: HCPCS | Performed by: INTERNAL MEDICINE

## 2022-10-27 PROCEDURE — 74248 X-RAY SM INT F-THRU STD: CPT | Mod: TC

## 2022-10-27 RX ADMIN — BARIUM SULFATE 500 ML: 980 POWDER, FOR SUSPENSION ORAL at 01:10

## 2022-10-28 ENCOUNTER — HOSPITAL ENCOUNTER (OUTPATIENT)
Dept: RADIOLOGY | Facility: HOSPITAL | Age: 47
Discharge: HOME | End: 2022-10-28
Attending: EMERGENCY MEDICINE
Payer: MEDICARE

## 2022-10-28 DIAGNOSIS — R10.9 UNSPECIFIED ABDOMINAL PAIN: ICD-10-CM

## 2022-10-28 DIAGNOSIS — Z12.31 ENCOUNTER FOR SCREENING MAMMOGRAM FOR MALIGNANT NEOPLASM OF BREAST: ICD-10-CM

## 2022-10-28 PROCEDURE — 76700 US EXAM ABDOM COMPLETE: CPT | Mod: TC

## 2022-10-29 ENCOUNTER — LAB VISIT (OUTPATIENT)
Dept: LAB | Facility: HOSPITAL | Age: 47
End: 2022-10-29
Attending: EMERGENCY MEDICINE
Payer: MEDICARE

## 2022-10-29 DIAGNOSIS — D72.10 EOSINOPHILIA: Primary | ICD-10-CM

## 2022-10-29 LAB
ALBUMIN SERPL BCP-MCNC: 4.2 G/DL (ref 3.5–5.2)
ALP SERPL-CCNC: 71 U/L (ref 55–135)
ALT SERPL W/O P-5'-P-CCNC: 24 U/L (ref 10–44)
ANION GAP SERPL CALC-SCNC: 9 MMOL/L (ref 8–16)
AST SERPL-CCNC: 28 U/L (ref 10–40)
BASOPHILS # BLD AUTO: 0.09 K/UL (ref 0–0.2)
BASOPHILS NFR BLD: 1 % (ref 0–1.9)
BILIRUB SERPL-MCNC: 0.7 MG/DL (ref 0.1–1)
BUN SERPL-MCNC: 11 MG/DL (ref 6–20)
CALCIUM SERPL-MCNC: 8.9 MG/DL (ref 8.7–10.5)
CHLORIDE SERPL-SCNC: 106 MMOL/L (ref 95–110)
CO2 SERPL-SCNC: 23 MMOL/L (ref 23–29)
CREAT SERPL-MCNC: 0.8 MG/DL (ref 0.5–1.4)
DIFFERENTIAL METHOD: ABNORMAL
EOSINOPHIL # BLD AUTO: 1.9 K/UL (ref 0–0.5)
EOSINOPHIL NFR BLD: 20.2 % (ref 0–8)
ERYTHROCYTE [DISTWIDTH] IN BLOOD BY AUTOMATED COUNT: 13.3 % (ref 11.5–14.5)
EST. GFR  (NO RACE VARIABLE): >60 ML/MIN/1.73 M^2
GLUCOSE SERPL-MCNC: 111 MG/DL (ref 70–110)
HCT VFR BLD AUTO: 43.6 % (ref 37–48.5)
HGB BLD-MCNC: 14.4 G/DL (ref 12–16)
IMM GRANULOCYTES # BLD AUTO: 0.04 K/UL (ref 0–0.04)
IMM GRANULOCYTES NFR BLD AUTO: 0.4 % (ref 0–0.5)
LYMPHOCYTES # BLD AUTO: 2.6 K/UL (ref 1–4.8)
LYMPHOCYTES NFR BLD: 28.1 % (ref 18–48)
MCH RBC QN AUTO: 29.3 PG (ref 27–31)
MCHC RBC AUTO-ENTMCNC: 33 G/DL (ref 32–36)
MCV RBC AUTO: 89 FL (ref 82–98)
MONOCYTES # BLD AUTO: 0.6 K/UL (ref 0.3–1)
MONOCYTES NFR BLD: 7 % (ref 4–15)
NEUTROPHILS # BLD AUTO: 4 K/UL (ref 1.8–7.7)
NEUTROPHILS NFR BLD: 43.3 % (ref 38–73)
NRBC BLD-RTO: 0 /100 WBC
PLATELET # BLD AUTO: 368 K/UL (ref 150–450)
PMV BLD AUTO: 9.3 FL (ref 9.2–12.9)
POTASSIUM SERPL-SCNC: 3.6 MMOL/L (ref 3.5–5.1)
PROT SERPL-MCNC: 7.6 G/DL (ref 6–8.4)
RBC # BLD AUTO: 4.91 M/UL (ref 4–5.4)
SODIUM SERPL-SCNC: 138 MMOL/L (ref 136–145)
WBC # BLD AUTO: 9.19 K/UL (ref 3.9–12.7)

## 2022-10-29 PROCEDURE — 36415 COLL VENOUS BLD VENIPUNCTURE: CPT | Performed by: EMERGENCY MEDICINE

## 2022-10-29 PROCEDURE — 85025 COMPLETE CBC W/AUTO DIFF WBC: CPT | Performed by: EMERGENCY MEDICINE

## 2022-10-29 PROCEDURE — 80053 COMPREHEN METABOLIC PANEL: CPT | Performed by: EMERGENCY MEDICINE

## 2022-10-31 DIAGNOSIS — R92.8 OTHER ABNORMAL AND INCONCLUSIVE FINDINGS ON DIAGNOSTIC IMAGING OF BREAST: ICD-10-CM

## 2022-10-31 DIAGNOSIS — N64.52 NIPPLE DISCHARGE: Primary | ICD-10-CM

## 2022-11-01 ENCOUNTER — TELEPHONE (OUTPATIENT)
Dept: HEMATOLOGY/ONCOLOGY | Facility: CLINIC | Age: 47
End: 2022-11-01
Payer: MEDICARE

## 2022-11-01 DIAGNOSIS — D72.19 OTHER EOSINOPHILIA: Primary | ICD-10-CM

## 2022-11-01 NOTE — TELEPHONE ENCOUNTER
----- Message from Francisca Ortega sent at 11/1/2022  4:13 PM CDT -----  Type: Needs Medical Advice  Who Called:  Patient   Symptoms (please be specific):    How long has patient had these symptoms:    Pharmacy name and phone #:    Best Call Back Number: [phone]  Additional Information: Patient is requesting a call back from the nurse regarding test results. Patient also stated her IGE count was high.

## 2022-11-04 ENCOUNTER — TELEPHONE (OUTPATIENT)
Dept: RADIOLOGY | Facility: HOSPITAL | Age: 47
End: 2022-11-04

## 2022-11-04 NOTE — NURSING
Bone marrow biopsy scheduled @ [facility] on 11/17 @ 9am with arrival @ 7am.  Pre-procedure instructions given and understanding verbalized.  Ok to take valium, thorazine and buspar am of procedure with sip of water.

## 2022-11-07 ENCOUNTER — TELEPHONE (OUTPATIENT)
Dept: HEMATOLOGY/ONCOLOGY | Facility: CLINIC | Age: 47
End: 2022-11-07
Payer: MEDICARE

## 2022-11-07 DIAGNOSIS — D72.19 OTHER EOSINOPHILIA: Primary | ICD-10-CM

## 2022-11-07 NOTE — TELEPHONE ENCOUNTER
----- Message from Alondra Mya sent at 11/7/2022  1:44 PM CST -----  Contact: Patient  Type: Needs Medical Advice  Who Called: Patient  Best Call Back Number: [phone]  Additional Information: Patient requesting a call to schedule appointment  Please call Patient

## 2022-11-08 ENCOUNTER — LAB VISIT (OUTPATIENT)
Dept: LAB | Facility: HOSPITAL | Age: 47
End: 2022-11-08
Attending: EMERGENCY MEDICINE
Payer: MEDICARE

## 2022-11-08 DIAGNOSIS — D72.10 EOSINOPHILIA: Primary | ICD-10-CM

## 2022-11-08 LAB
ALBUMIN SERPL BCP-MCNC: 4.4 G/DL (ref 3.5–5.2)
ALP SERPL-CCNC: 65 U/L (ref 55–135)
ALT SERPL W/O P-5'-P-CCNC: 22 U/L (ref 10–44)
ANION GAP SERPL CALC-SCNC: 9 MMOL/L (ref 8–16)
AST SERPL-CCNC: 22 U/L (ref 10–40)
BASOPHILS # BLD AUTO: 0.08 K/UL (ref 0–0.2)
BASOPHILS NFR BLD: 0.9 % (ref 0–1.9)
BILIRUB SERPL-MCNC: 0.5 MG/DL (ref 0.1–1)
BUN SERPL-MCNC: 16 MG/DL (ref 6–20)
CALCIUM SERPL-MCNC: 9.3 MG/DL (ref 8.7–10.5)
CHLORIDE SERPL-SCNC: 103 MMOL/L (ref 95–110)
CO2 SERPL-SCNC: 27 MMOL/L (ref 23–29)
CREAT SERPL-MCNC: 0.9 MG/DL (ref 0.5–1.4)
DIFFERENTIAL METHOD: ABNORMAL
EOSINOPHIL # BLD AUTO: 0.7 K/UL (ref 0–0.5)
EOSINOPHIL NFR BLD: 7.7 % (ref 0–8)
ERYTHROCYTE [DISTWIDTH] IN BLOOD BY AUTOMATED COUNT: 13.2 % (ref 11.5–14.5)
EST. GFR  (NO RACE VARIABLE): >60 ML/MIN/1.73 M^2
GLUCOSE SERPL-MCNC: 95 MG/DL (ref 70–110)
HCT VFR BLD AUTO: 42.4 % (ref 37–48.5)
HGB BLD-MCNC: 14 G/DL (ref 12–16)
IMM GRANULOCYTES # BLD AUTO: 0.04 K/UL (ref 0–0.04)
IMM GRANULOCYTES NFR BLD AUTO: 0.5 % (ref 0–0.5)
LYMPHOCYTES # BLD AUTO: 3 K/UL (ref 1–4.8)
LYMPHOCYTES NFR BLD: 34.1 % (ref 18–48)
MCH RBC QN AUTO: 29.8 PG (ref 27–31)
MCHC RBC AUTO-ENTMCNC: 33 G/DL (ref 32–36)
MCV RBC AUTO: 90 FL (ref 82–98)
MONOCYTES # BLD AUTO: 0.6 K/UL (ref 0.3–1)
MONOCYTES NFR BLD: 6.3 % (ref 4–15)
NEUTROPHILS # BLD AUTO: 4.5 K/UL (ref 1.8–7.7)
NEUTROPHILS NFR BLD: 50.5 % (ref 38–73)
NRBC BLD-RTO: 0 /100 WBC
PLATELET # BLD AUTO: 385 K/UL (ref 150–450)
PMV BLD AUTO: 9.1 FL (ref 9.2–12.9)
POTASSIUM SERPL-SCNC: 3.5 MMOL/L (ref 3.5–5.1)
PROT SERPL-MCNC: 7.6 G/DL (ref 6–8.4)
RBC # BLD AUTO: 4.7 M/UL (ref 4–5.4)
SODIUM SERPL-SCNC: 139 MMOL/L (ref 136–145)
WBC # BLD AUTO: 8.88 K/UL (ref 3.9–12.7)

## 2022-11-08 PROCEDURE — 80053 COMPREHEN METABOLIC PANEL: CPT | Performed by: EMERGENCY MEDICINE

## 2022-11-08 PROCEDURE — 85025 COMPLETE CBC W/AUTO DIFF WBC: CPT | Performed by: EMERGENCY MEDICINE

## 2022-11-08 PROCEDURE — 36415 COLL VENOUS BLD VENIPUNCTURE: CPT | Performed by: EMERGENCY MEDICINE

## 2022-11-16 ENCOUNTER — TELEPHONE (OUTPATIENT)
Dept: RADIOLOGY | Facility: HOSPITAL | Age: 47
End: 2022-11-16

## 2022-11-16 DIAGNOSIS — D72.0 GENETIC ANOMALIES OF LEUKOCYTES: ICD-10-CM

## 2022-11-16 DIAGNOSIS — D72.19 OTHER EOSINOPHILIA: Primary | ICD-10-CM

## 2022-11-16 LAB
LABCORP MISC TEST CODE: NORMAL
LABCORP MISC TEST NAME: NORMAL
LABCORP MISCELLANEOUS TEST: NORMAL

## 2022-11-17 ENCOUNTER — HOSPITAL ENCOUNTER (OUTPATIENT)
Dept: RADIOLOGY | Facility: HOSPITAL | Age: 47
Discharge: HOME | End: 2022-11-17
Attending: INTERNAL MEDICINE
Payer: MEDICARE

## 2022-11-17 ENCOUNTER — TELEPHONE (OUTPATIENT)
Dept: HEMATOLOGY/ONCOLOGY | Facility: CLINIC | Age: 47
End: 2022-11-17
Payer: MEDICARE

## 2022-11-17 VITALS
OXYGEN SATURATION: 94 % | BODY MASS INDEX: 28.04 KG/M2 | HEART RATE: 87 BPM | DIASTOLIC BLOOD PRESSURE: 74 MMHG | HEIGHT: 66 IN | SYSTOLIC BLOOD PRESSURE: 111 MMHG | TEMPERATURE: 99 F | RESPIRATION RATE: 16 BRPM

## 2022-11-17 DIAGNOSIS — D72.0 GENETIC ANOMALIES OF LEUKOCYTES: ICD-10-CM

## 2022-11-17 DIAGNOSIS — D72.19 OTHER EOSINOPHILIA: ICD-10-CM

## 2022-11-17 LAB
APTT PPP: 21.6 SEC (ref 23.3–35.1)
BASOPHILS # BLD AUTO: 0.08 K/UL (ref 0–0.2)
BASOPHILS NFR BLD: 0.8 % (ref 0–1.9)
DIFFERENTIAL METHOD: ABNORMAL
EOSINOPHIL # BLD AUTO: 0.6 K/UL (ref 0–0.5)
EOSINOPHIL NFR BLD: 5.7 % (ref 0–8)
ERYTHROCYTE [DISTWIDTH] IN BLOOD BY AUTOMATED COUNT: 12.9 % (ref 11.5–14.5)
HCT VFR BLD AUTO: 39.9 % (ref 37–48.5)
HGB BLD-MCNC: 13.8 G/DL (ref 12–16)
IMM GRANULOCYTES # BLD AUTO: 0.03 K/UL (ref 0–0.04)
IMM GRANULOCYTES NFR BLD AUTO: 0.3 % (ref 0–0.5)
INR PPP: 1.1
LYMPHOCYTES # BLD AUTO: 3.2 K/UL (ref 1–4.8)
LYMPHOCYTES NFR BLD: 31.4 % (ref 18–48)
MCH RBC QN AUTO: 31 PG (ref 27–31)
MCHC RBC AUTO-ENTMCNC: 34.6 G/DL (ref 32–36)
MCV RBC AUTO: 90 FL (ref 82–98)
MONOCYTES # BLD AUTO: 0.5 K/UL (ref 0.3–1)
MONOCYTES NFR BLD: 5.2 % (ref 4–15)
NEUTROPHILS # BLD AUTO: 5.7 K/UL (ref 1.8–7.7)
NEUTROPHILS NFR BLD: 56.6 % (ref 38–73)
NRBC BLD-RTO: 0 /100 WBC
PLATELET # BLD AUTO: 313 K/UL (ref 150–450)
PMV BLD AUTO: 9.2 FL (ref 9.2–12.9)
PROTHROMBIN TIME: 13.2 SEC (ref 11.4–13.7)
RBC # BLD AUTO: 4.45 M/UL (ref 4–5.4)
WBC # BLD AUTO: 10.11 K/UL (ref 3.9–12.7)

## 2022-11-17 PROCEDURE — 25000003 PHARM REV CODE 250: Performed by: RADIOLOGY

## 2022-11-17 PROCEDURE — 85610 PROTHROMBIN TIME: CPT | Performed by: RADIOLOGY

## 2022-11-17 PROCEDURE — 85730 THROMBOPLASTIN TIME PARTIAL: CPT | Performed by: RADIOLOGY

## 2022-11-17 PROCEDURE — A4550 SURGICAL TRAYS: HCPCS

## 2022-11-17 PROCEDURE — 99152 MOD SED SAME PHYS/QHP 5/>YRS: CPT

## 2022-11-17 PROCEDURE — 88311 DECALCIFY TISSUE: CPT | Mod: TC

## 2022-11-17 PROCEDURE — 63600175 PHARM REV CODE 636 W HCPCS: Performed by: RADIOLOGY

## 2022-11-17 PROCEDURE — 85025 COMPLETE CBC W/AUTO DIFF WBC: CPT | Performed by: RADIOLOGY

## 2022-11-17 PROCEDURE — 88305 TISSUE EXAM BY PATHOLOGIST: CPT | Mod: TC

## 2022-11-17 RX ORDER — HYDROCODONE BITARTRATE AND ACETAMINOPHEN 5; 325 MG/1; MG/1
1 TABLET ORAL EVERY 4 HOURS PRN
Status: DISCONTINUED | OUTPATIENT
Start: 2022-11-17 | End: 2022-11-18 | Stop reason: HOSPADM

## 2022-11-17 RX ORDER — MIDAZOLAM HYDROCHLORIDE 1 MG/ML
INJECTION INTRAMUSCULAR; INTRAVENOUS
Status: COMPLETED | OUTPATIENT
Start: 2022-11-17 | End: 2022-11-17

## 2022-11-17 RX ORDER — FENTANYL CITRATE 50 UG/ML
INJECTION, SOLUTION INTRAMUSCULAR; INTRAVENOUS
Status: COMPLETED | OUTPATIENT
Start: 2022-11-17 | End: 2022-11-17

## 2022-11-17 RX ORDER — SODIUM CHLORIDE 9 MG/ML
INJECTION, SOLUTION INTRAVENOUS
Status: COMPLETED | OUTPATIENT
Start: 2022-11-17 | End: 2022-11-17

## 2022-11-17 RX ADMIN — FENTANYL CITRATE 50 MCG: 50 INJECTION INTRAMUSCULAR; INTRAVENOUS at 09:11

## 2022-11-17 RX ADMIN — SODIUM CHLORIDE 500 ML/HR: 9 INJECTION, SOLUTION INTRAVENOUS at 08:11

## 2022-11-17 RX ADMIN — MIDAZOLAM HYDROCHLORIDE 2 MG: 1 INJECTION, SOLUTION INTRAMUSCULAR; INTRAVENOUS at 09:11

## 2022-11-17 RX ADMIN — MIDAZOLAM HYDROCHLORIDE 1 MG: 1 INJECTION, SOLUTION INTRAMUSCULAR; INTRAVENOUS at 09:11

## 2022-11-17 NOTE — PLAN OF CARE
Tolerated bone marrow biopsy well. No immediate complications. Transferred to [facility] via stretcher on room air.

## 2022-11-17 NOTE — TELEPHONE ENCOUNTER
----- Message from Fadumo Tate sent at 11/16/2022  4:54 PM CST -----  Contact: Patient  Type:  Test Results    Who Called: Patient     Name of Test (Lab/Mammo/Etc): lab     Date of Test: 10/29    Ordering Provider:      Where the test was performed:  [facility]     Would the patient rather a call back or a response via [facility]? Call     Best Call Back Number: [phone] (home)      Additional Information:

## 2022-11-30 ENCOUNTER — HOSPITAL ENCOUNTER (OUTPATIENT)
Dept: RADIOLOGY | Facility: HOSPITAL | Age: 47
Discharge: HOME | End: 2022-11-30
Attending: EMERGENCY MEDICINE
Payer: MEDICAID

## 2022-11-30 ENCOUNTER — HOSPITAL ENCOUNTER (OUTPATIENT)
Dept: RADIOLOGY | Facility: HOSPITAL | Age: 47
Discharge: HOME | End: 2022-11-30
Attending: EMERGENCY MEDICINE
Payer: MEDICARE

## 2022-11-30 DIAGNOSIS — R92.8 OTHER ABNORMAL AND INCONCLUSIVE FINDINGS ON DIAGNOSTIC IMAGING OF BREAST: ICD-10-CM

## 2022-11-30 DIAGNOSIS — N64.52 NIPPLE DISCHARGE: ICD-10-CM

## 2022-11-30 PROCEDURE — 77066 DX MAMMO INCL CAD BI: CPT | Mod: TC,PO

## 2022-11-30 PROCEDURE — 76642 ULTRASOUND BREAST LIMITED: CPT | Mod: TC,50,PO

## 2022-12-02 NOTE — PROGRESS NOTES
"ALLERGY & IMMUNOLOGY CLINIC -  NEW  PATIENT     HISTORY OF PRESENT ILLNESS     Patient ID: Rosalina Jacobo is a 47 y.o. female    CC: Elevated Eos    HPI: 47 year old presents for evaluation of elevated Eosinophils. States that she has been experiencing significant headaches, malaise, fatigue, and night sweats for the previous 3 months. Denies unexplained weight loss in that time. States she has been experiencing sneezing, runny nose, watery eyes since she was a child. Does not take any medications for her allergy symptoms, previously took cetirizine with relief of symptoms. Denies wheezing, nighttime coughing. Does feel that she has been short of breath with simple activities over the previous few months as well. Denies skin rash. Additionally has been nausea and diarrhea in this time as well. Does feel like she is having fevers as well. Additionally endorses bilateral hand swelling    Current Medications: Buspar BID, Thorazine TID, Valium BID, Compazine TID PRN,     Aspirin: Develops diffuse swelling and "looks pregnant" when she takes. Swelling lasted several hours, and she denies nasal congestion and shortness of breath/wheezing.    Gabapentin: Develops diffuse hives when she takes, last used 5-6 years ago    Haloperidol: Dystonic reaction    Penicillins: Diffuse rash, last used a year ago      H/o Asthma: denies  H/o Eczema: denies  H/o Rhinitis: denies  Oral Allergy:  denies  Venom Allergy: denies  Latex Allergy: denies  Env/Occ: denies any environmental or occupational exposures     REVIEW OF SYSTEMS     EYES: Denies itchy/watery eyes, denies injected eyes  EARS: no hearing loss, no sensation of fullness  NOSE: no congestion, no rhinorrhea, no discharge, no itching, no sneezing  PULM: no SOB, no wheezing, no cough, no snoring  CV: no CP, no palpitations, no leg swelling  GI: no dysphagia, no heartburn, no pain, no N/V/D  DERM: no rashes, no skin breaks    Balance of review of systems negative except as " "mentioned above     MEDICAL HISTORY     MedHx: active problems reviewed  SurgHx:   Past Surgical History:   Procedure Laterality Date    APPENDECTOMY      BLADDER SUSPENSION      CERVICAL FUSION       SECTION      CHOLECYSTECTOMY      CYSTOSCOPY WITH BIOPSY OF BLADDER N/A 2018    Procedure: CYSTOSCOPY, WITH BLADDER BIOPSY, WITH FULGURATION IF INDICATED;  Surgeon: Luna Slater MD;  [facility]: [facility];  Service: Urology;  Laterality: N/A;    CYSTOSCOPY WITH HYDRODISTENSION OF BLADDER N/A 12/3/2019    Procedure: CYSTOSCOPY, WITH BLADDER HYDRODISTENSION;  Surgeon: Gumaro Mcgovern MD;  Location: [facility] OR;  Service: OB/GYN;  Laterality: N/A;    DILATION OF URETHRA N/A 2018    Procedure: DILATION, URETHRA;  Surgeon: Luna Slater MD;  Location: [facility] OR;  Service: Urology;  Laterality: N/A;    ESOPHAGOGASTRODUODENOSCOPY N/A 2022    Procedure: EGD (ESOPHAGOGASTRODUODENOSCOPY);  Surgeon: Satinder Rdz MD;  Location: [facility];  Service: Endoscopy;  Laterality: N/A;    HYSTERECTOMY      KNEE SURGERY Bilateral     TIBIA FRACTURE SURGERY Right     tibia/ fibula repaired with pins and screws    TOE SURGERY Left        SocHx:   -Smokes 1/2 PPD for the previous 26+ years  -Alcohol Use: Denies  -Pets: 3 dogs, 2 cats  -Mold/Water Damage: denies  -School/Work: Not currently working    FamHx:   Otherwise no Family History of asthma, allergic rhinitis, atopic dermatitis, drug allergy, food allergy, venom allergy or immune deficiency.     Allergies: see below  Medications: MAR reviewed       PHYSICAL EXAM     VS: /76 (BP Location: Left arm, Patient Position: Sitting, BP Method: Large (Manual))   Ht 5' 6" (1.676 m)   Wt 82.9 kg (182 lb 12.2 oz)   LMP  (LMP Unknown)   BMI 29.50 kg/m²   GENERAL: awake, alert, cooperative with exam  EYES: PERRL, EOMI, no conjunctival injection, no discharge, no infraorbital shiners  EARS: external auditory canals normal B/L, TM normal B/L  NOSE: NT " 3+ and pale/pink B/L, +stringing mucous, no polyps  ORAL: MMM, no ulcers, no thrush, no cobblestoning  NECK: supple, trachea midline, no cervical or submandibular LAD  LUNGS: CTAB, no w/r/c, no increased WOB  HEART: Normal Rate and regular rhythm, normal S1/S2, no m/g/r  ABDOMEN: Normoactive bowel sounds, soft, non-tender, non-distended, no HSM  EXTREMITIES: +2 distal pulses, no c/c/e  DERM: no rashes, no skin breaks  NEURO: normal gait, no facial asymmetry     LABORATORY STUDIES     Component      Latest Ref Rng & Units 11/17/2022 11/8/2022 10/29/2022 10/24/2022   WBC      3.90 - 12.70 K/uL 10.11 8.88 9.19 7.90   Immature Granulocytes      0.0 - 0.5 % 0.3 0.5 0.4 0.4   Gran # (ANC)      1.8 - 7.7 K/uL 5.7 4.5 4.0 3.2   Immature Grans (Abs)      0.00 - 0.04 K/uL 0.03 0.04 0.04 0.03   Lymph #      1.0 - 4.8 K/uL 3.2 3.0 2.6 2.5   Mono #      0.3 - 1.0 K/uL 0.5 0.6 0.6 0.4   Eos #      0.0 - 0.5 K/uL 0.6 (H) 0.7 (H) 1.9 (H) 1.7 (H)     Component      Latest Ref Rng & Units 10/16/2022 10/13/2022 10/10/2022 10/4/2022   WBC      3.90 - 12.70 K/uL 7.81 11.47 9.78 9.40   Immature Granulocytes      0.0 - 0.5 % 0.5 0.5 0.6 (H) 1.8 (H)   Gran # (ANC)      1.8 - 7.7 K/uL 4.0 6.1 5.7 4.1   Immature Grans (Abs)      0.00 - 0.04 K/uL 0.04 0.06 (H) 0.06 (H) 0.17 (H)   Lymph #      1.0 - 4.8 K/uL 2.3 3.1 2.5 2.9   Mono #      0.3 - 1.0 K/uL 0.5 0.7 0.5 0.7   Eos #      0.0 - 0.5 K/uL 1.0 (H) 1.3 (H) 0.9 (H) 1.5 (H)     Component      Latest Ref Rng & Units 10/3/2022 10/2/2022 9/28/2022 9/16/2022   WBC      3.90 - 12.70 K/uL 9.97 13.29 (H) 10.16 8.88   Immature Granulocytes      0.0 - 0.5 % 1.8 (H) 3.2 (H) 0.5 0.3   Gran # (ANC)      1.8 - 7.7 K/uL 4.8 7.5 6.2 4.1   Immature Grans (Abs)      0.00 - 0.04 K/uL 0.18 (H) 0.43 (H) 0.05 (H) 0.03   Lymph #      1.0 - 4.8 K/uL 3.1 3.7 1.9 3.4   Mono #      0.3 - 1.0 K/uL 0.8 0.7 0.9 0.5   Eos #      0.0 - 0.5 K/uL 1.1 (H) 1.0 (H) 1.1 (H) 0.8 (H)     Component      Latest Ref Rng & Units  9/9/2022 1/21/2022 1/20/2022   WBC      3.90 - 12.70 K/uL 8.29 5.98 7.85   Immature Granulocytes      0.0 - 0.5 % 0.4  0.1   Gran # (ANC)      1.8 - 7.7 K/uL 5.2  3.8   Immature Grans (Abs)      0.00 - 0.04 K/uL 0.03  0.01   Lymph #      1.0 - 4.8 K/uL 2.1  2.9   Mono #      0.3 - 1.0 K/uL 0.5  0.8   Eos #      0.0 - 0.5 K/uL 0.4  0.3     Component      Latest Ref Rng & Units 11/8/2022             Sodium      136 - 145 mmol/L 139   Potassium      3.5 - 5.1 mmol/L 3.5   Chloride      95 - 110 mmol/L 103   CO2      23 - 29 mmol/L 27   Glucose      70 - 110 mg/dL 95   BUN      6 - 20 mg/dL 16   Creatinine      0.5 - 1.4 mg/dL 0.9   Calcium      8.7 - 10.5 mg/dL 9.3   PROTEIN TOTAL      6.0 - 8.4 g/dL 7.6   Albumin      3.5 - 5.2 g/dL 4.4   BILIRUBIN TOTAL      0.1 - 1.0 mg/dL 0.5   Alkaline Phosphatase      55 - 135 U/L 65   AST      10 - 40 U/L 22   ALT      10 - 44 U/L 22   Anion Gap      8 - 16 mmol/L 9   eGFR      >60 mL/min/1.73 m:2 >60.0     Component      Latest Ref Rng & Units 10/14/2022   Tryptase      2.2 - 13.2 ug/L 12.0     Component      Latest Ref Rng & Units 10/14/2022   Vitamin B-12      210 - 950 pg/mL 369        IMAGING & OTHER DIAGNOSTICS     IMPRESSION:  1.  Fluid-filled loops of small bowel with scattered air-fluid levels in borderline wall thickening. Small bowel measures up to 2.8 cm within the right abdomen. The terminal small bowel is decompressed. No definite transition point. G Findings can be seen with enteritis with ileus. No high-grade bowel obstruction is noted. I cannot exclude a developing or low-grade obstruction. Close clinical follow-up is recommended.  2.  There is wall thickening within the bladder. Correlate with urinalysis for cystitis.  3.  Hysterectomy.  4. Cholecystectomy.     CHART REVIEW     Previous Notes     ASSESSMENT & PLAN     Rosalina Jacobo is a 47 y.o. female with     Other eosinophilia  -     Ambulatory referral/consult to Immunology    47 year old female with  three month history of non-specific symptoms including malaise, fatigue, headaches, nausea and diarrhea with associated elevated Eosinophil count. No specific allergic diagnoses, but elevated IgE and Eosinophil count with otherwise unremarkable Hypereosinophilic work up with normal Vitamin B12, tryptase, and no evidence of end organ damage (No changes on EGD, normal LFTs and kidney function). CHIC2 mutation and bone marrow biopsy pending at this time. May be a candidate for Anti-IL-5 therapy (Mepolizumab) given elevated Eosinophil counts; however, labwork does not quite meet criteria for HES at this time. Will send for additional immune screening today and Strongyloides IgG as well. Pending Bone marrow results, need to consider CT Chest as well as T Cell Gene rearrangement on bone marrow sample/blood     Follow up: Pending Lab work up      Quirino Goncalves MD

## 2022-12-05 ENCOUNTER — OFFICE VISIT (OUTPATIENT)
Dept: ALLERGY | Facility: CLINIC | Age: 47
End: 2022-12-05
Payer: MEDICARE

## 2022-12-05 VITALS
SYSTOLIC BLOOD PRESSURE: 115 MMHG | WEIGHT: 182.75 LBS | BODY MASS INDEX: 29.37 KG/M2 | DIASTOLIC BLOOD PRESSURE: 76 MMHG | HEIGHT: 66 IN

## 2022-12-05 DIAGNOSIS — D72.19 OTHER EOSINOPHILIA: ICD-10-CM

## 2022-12-05 PROCEDURE — 3074F SYST BP LT 130 MM HG: CPT | Mod: CPTII,S$GLB,, | Performed by: STUDENT IN AN ORGANIZED HEALTH CARE EDUCATION/TRAINING PROGRAM

## 2022-12-05 PROCEDURE — 99999 PR PBB SHADOW E&M-EST. PATIENT-LVL III: ICD-10-PCS | Mod: PBBFAC,,, | Performed by: STUDENT IN AN ORGANIZED HEALTH CARE EDUCATION/TRAINING PROGRAM

## 2022-12-05 PROCEDURE — 3078F DIAST BP <80 MM HG: CPT | Mod: CPTII,S$GLB,, | Performed by: STUDENT IN AN ORGANIZED HEALTH CARE EDUCATION/TRAINING PROGRAM

## 2022-12-05 PROCEDURE — 1159F PR MEDICATION LIST DOCUMENTED IN MEDICAL RECORD: ICD-10-PCS | Mod: CPTII,S$GLB,, | Performed by: STUDENT IN AN ORGANIZED HEALTH CARE EDUCATION/TRAINING PROGRAM

## 2022-12-05 PROCEDURE — 3008F BODY MASS INDEX DOCD: CPT | Mod: CPTII,S$GLB,, | Performed by: STUDENT IN AN ORGANIZED HEALTH CARE EDUCATION/TRAINING PROGRAM

## 2022-12-05 PROCEDURE — 99999 PR PBB SHADOW E&M-EST. PATIENT-LVL III: CPT | Mod: PBBFAC,,, | Performed by: STUDENT IN AN ORGANIZED HEALTH CARE EDUCATION/TRAINING PROGRAM

## 2022-12-05 PROCEDURE — 99204 OFFICE O/P NEW MOD 45 MIN: CPT | Mod: S$GLB,,, | Performed by: STUDENT IN AN ORGANIZED HEALTH CARE EDUCATION/TRAINING PROGRAM

## 2022-12-05 PROCEDURE — 3008F PR BODY MASS INDEX (BMI) DOCUMENTED: ICD-10-PCS | Mod: CPTII,S$GLB,, | Performed by: STUDENT IN AN ORGANIZED HEALTH CARE EDUCATION/TRAINING PROGRAM

## 2022-12-05 PROCEDURE — 99204 PR OFFICE/OUTPT VISIT, NEW, LEVL IV, 45-59 MIN: ICD-10-PCS | Mod: S$GLB,,, | Performed by: STUDENT IN AN ORGANIZED HEALTH CARE EDUCATION/TRAINING PROGRAM

## 2022-12-05 PROCEDURE — 3078F PR MOST RECENT DIASTOLIC BLOOD PRESSURE < 80 MM HG: ICD-10-PCS | Mod: CPTII,S$GLB,, | Performed by: STUDENT IN AN ORGANIZED HEALTH CARE EDUCATION/TRAINING PROGRAM

## 2022-12-05 PROCEDURE — 3074F PR MOST RECENT SYSTOLIC BLOOD PRESSURE < 130 MM HG: ICD-10-PCS | Mod: CPTII,S$GLB,, | Performed by: STUDENT IN AN ORGANIZED HEALTH CARE EDUCATION/TRAINING PROGRAM

## 2022-12-05 PROCEDURE — 1159F MED LIST DOCD IN RCRD: CPT | Mod: CPTII,S$GLB,, | Performed by: STUDENT IN AN ORGANIZED HEALTH CARE EDUCATION/TRAINING PROGRAM

## 2022-12-08 LAB
LABCORP MISC TEST CODE: 9985
LABCORP MISC TEST NAME: NORMAL
LABCORP MISCELLANEOUS TEST: NORMAL

## 2022-12-09 ENCOUNTER — LAB VISIT (OUTPATIENT)
Dept: LAB | Facility: HOSPITAL | Age: 47
End: 2022-12-09
Attending: STUDENT IN AN ORGANIZED HEALTH CARE EDUCATION/TRAINING PROGRAM
Payer: MEDICARE

## 2022-12-09 DIAGNOSIS — D72.19 EOSINOPHILIC SYNDROME: Primary | ICD-10-CM

## 2022-12-09 LAB — MISCELLANEOUS TEST NAME: NORMAL

## 2022-12-09 PROCEDURE — 30000890 HC MISC. SEND OUT TEST

## 2022-12-09 PROCEDURE — 82784 ASSAY IGA/IGD/IGG/IGM EACH: CPT | Performed by: STUDENT IN AN ORGANIZED HEALTH CARE EDUCATION/TRAINING PROGRAM

## 2022-12-09 PROCEDURE — 86357 NK CELLS TOTAL COUNT: CPT

## 2022-12-09 PROCEDURE — 86356 MONONUCLEAR CELL ANTIGEN: CPT | Performed by: STUDENT IN AN ORGANIZED HEALTH CARE EDUCATION/TRAINING PROGRAM

## 2022-12-09 PROCEDURE — 86359 T CELLS TOTAL COUNT: CPT

## 2022-12-09 PROCEDURE — 82784 ASSAY IGA/IGD/IGG/IGM EACH: CPT | Mod: 91 | Performed by: STUDENT IN AN ORGANIZED HEALTH CARE EDUCATION/TRAINING PROGRAM

## 2022-12-09 PROCEDURE — 30000890 LABCORP MISCELLANEOUS TEST: Performed by: STUDENT IN AN ORGANIZED HEALTH CARE EDUCATION/TRAINING PROGRAM

## 2022-12-09 PROCEDURE — 86360 T CELL ABSOLUTE COUNT/RATIO: CPT

## 2022-12-09 PROCEDURE — 36415 COLL VENOUS BLD VENIPUNCTURE: CPT | Performed by: STUDENT IN AN ORGANIZED HEALTH CARE EDUCATION/TRAINING PROGRAM

## 2022-12-09 PROCEDURE — 86682 HELMINTH ANTIBODY: CPT

## 2022-12-10 LAB
IGA SERPL-MCNC: 257 MG/DL (ref 87–352)
IGG SERPL-MCNC: 921 MG/DL (ref 586–1602)
IGM SERPL-MCNC: 110 MG/DL (ref 26–217)

## 2022-12-13 LAB
LABCORP MISC TEST CODE: NORMAL
LABCORP MISC TEST NAME: NORMAL
LABCORP MISCELLANEOUS TEST: NORMAL

## 2022-12-15 ENCOUNTER — TELEPHONE (OUTPATIENT)
Dept: ALLERGY | Facility: CLINIC | Age: 47
End: 2022-12-15
Payer: MEDICARE

## 2022-12-15 DIAGNOSIS — D72.19 OTHER EOSINOPHILIA: Primary | ICD-10-CM

## 2022-12-15 NOTE — TELEPHONE ENCOUNTER
----- Message from Elvia Rosenbaum sent at 12/15/2022  1:17 PM CST -----  Contact: Patient  Type:  Needs Medical Advice    Who Called:  Patient      Would the patient rather a call back or a response via [facility]?  Call    Best Call Back Number:  [phone] (home)     Additional Information:  Patient needs to speak to the nurse about the blood test she did on 12/09    Please call to advise

## 2022-12-15 NOTE — TELEPHONE ENCOUNTER
Returned patient's call, informed patient all lab results were not back.  Dr. Goncalves will contact when all lab results are in.  Patient verbalized understanding.

## 2022-12-15 NOTE — TELEPHONE ENCOUNTER
Called to discuss immune work up. Lymphocyte subsets and Immunoglobulins all within normal limits. Unable to view Strongyloides IgG at this time, but will call lab for final result. Discussed further work up and recommend to see GI to consider EGD and colonoscopy given GI symptoms with fevers, chills and night sweats. Will additionally order CRP and ESR, consider rheumatology evaluation    Quirino Goncalves

## 2022-12-16 ENCOUNTER — LAB VISIT (OUTPATIENT)
Dept: LAB | Facility: HOSPITAL | Age: 47
End: 2022-12-16
Attending: STUDENT IN AN ORGANIZED HEALTH CARE EDUCATION/TRAINING PROGRAM
Payer: MEDICAID

## 2022-12-16 DIAGNOSIS — D72.19 OTHER EOSINOPHILIA: ICD-10-CM

## 2022-12-16 LAB
CRP SERPL-MCNC: 0.05 MG/DL
ERYTHROCYTE [SEDIMENTATION RATE] IN BLOOD BY WESTERGREN METHOD: 1 MM/HR (ref 0–20)

## 2022-12-16 PROCEDURE — 86140 C-REACTIVE PROTEIN: CPT | Performed by: STUDENT IN AN ORGANIZED HEALTH CARE EDUCATION/TRAINING PROGRAM

## 2022-12-16 PROCEDURE — 85651 RBC SED RATE NONAUTOMATED: CPT | Performed by: STUDENT IN AN ORGANIZED HEALTH CARE EDUCATION/TRAINING PROGRAM

## 2022-12-16 PROCEDURE — 36415 COLL VENOUS BLD VENIPUNCTURE: CPT | Performed by: STUDENT IN AN ORGANIZED HEALTH CARE EDUCATION/TRAINING PROGRAM

## 2022-12-22 ENCOUNTER — LAB VISIT (OUTPATIENT)
Dept: LAB | Facility: HOSPITAL | Age: 47
End: 2022-12-22
Attending: INTERNAL MEDICINE
Payer: MEDICARE

## 2022-12-22 DIAGNOSIS — K58.9 IRRITABLE BOWEL SYNDROME: Primary | ICD-10-CM

## 2022-12-22 DIAGNOSIS — R19.7 DIARRHEA OF PRESUMED INFECTIOUS ORIGIN: ICD-10-CM

## 2022-12-22 DIAGNOSIS — R11.0 NAUSEA: ICD-10-CM

## 2022-12-22 DIAGNOSIS — D72.10 EOSINOPHILIA: ICD-10-CM

## 2022-12-22 DIAGNOSIS — R00.0 TACHYCARDIA, UNSPECIFIED: ICD-10-CM

## 2022-12-22 DIAGNOSIS — F31.32 MODERATE DEPRESSED BIPOLAR I DISORDER: ICD-10-CM

## 2022-12-22 LAB
ALBUMIN SERPL BCP-MCNC: 4.3 G/DL (ref 3.5–5.2)
ALP SERPL-CCNC: 65 U/L (ref 55–135)
ALT SERPL W/O P-5'-P-CCNC: 29 U/L (ref 10–44)
ANION GAP SERPL CALC-SCNC: 9 MMOL/L (ref 8–16)
AST SERPL-CCNC: 31 U/L (ref 10–40)
BASOPHILS # BLD AUTO: 0.08 K/UL (ref 0–0.2)
BASOPHILS NFR BLD: 0.9 % (ref 0–1.9)
BILIRUB SERPL-MCNC: 0.4 MG/DL (ref 0.1–1)
BUN SERPL-MCNC: 15 MG/DL (ref 6–20)
CALCIUM SERPL-MCNC: 9.2 MG/DL (ref 8.7–10.5)
CHLORIDE SERPL-SCNC: 105 MMOL/L (ref 95–110)
CO2 SERPL-SCNC: 25 MMOL/L (ref 23–29)
CREAT SERPL-MCNC: 0.9 MG/DL (ref 0.5–1.4)
CRP SERPL-MCNC: 0.05 MG/DL
DIFFERENTIAL METHOD: NORMAL
EOSINOPHIL # BLD AUTO: 0.4 K/UL (ref 0–0.5)
EOSINOPHIL NFR BLD: 4.4 % (ref 0–8)
ERYTHROCYTE [DISTWIDTH] IN BLOOD BY AUTOMATED COUNT: 13.1 % (ref 11.5–14.5)
EST. GFR  (NO RACE VARIABLE): >60 ML/MIN/1.73 M^2
GLUCOSE SERPL-MCNC: 182 MG/DL (ref 70–110)
HCT VFR BLD AUTO: 44.5 % (ref 37–48.5)
HGB BLD-MCNC: 14.7 G/DL (ref 12–16)
IMM GRANULOCYTES # BLD AUTO: 0.04 K/UL (ref 0–0.04)
IMM GRANULOCYTES NFR BLD AUTO: 0.4 % (ref 0–0.5)
LYMPHOCYTES # BLD AUTO: 2.3 K/UL (ref 1–4.8)
LYMPHOCYTES NFR BLD: 24.8 % (ref 18–48)
MCH RBC QN AUTO: 30.1 PG (ref 27–31)
MCHC RBC AUTO-ENTMCNC: 33 G/DL (ref 32–36)
MCV RBC AUTO: 91 FL (ref 82–98)
MONOCYTES # BLD AUTO: 0.5 K/UL (ref 0.3–1)
MONOCYTES NFR BLD: 5.6 % (ref 4–15)
NEUTROPHILS # BLD AUTO: 5.8 K/UL (ref 1.8–7.7)
NEUTROPHILS NFR BLD: 63.9 % (ref 38–73)
NRBC BLD-RTO: 0 /100 WBC
PLATELET # BLD AUTO: 349 K/UL (ref 150–450)
PMV BLD AUTO: 9.5 FL (ref 9.2–12.9)
POTASSIUM SERPL-SCNC: 3.6 MMOL/L (ref 3.5–5.1)
PROT SERPL-MCNC: 7.5 G/DL (ref 6–8.4)
RBC # BLD AUTO: 4.88 M/UL (ref 4–5.4)
SODIUM SERPL-SCNC: 139 MMOL/L (ref 136–145)
TSH SERPL DL<=0.005 MIU/L-ACNC: 0.6 UIU/ML (ref 0.34–5.6)
WBC # BLD AUTO: 9.12 K/UL (ref 3.9–12.7)

## 2022-12-22 PROCEDURE — 80053 COMPREHEN METABOLIC PANEL: CPT | Performed by: INTERNAL MEDICINE

## 2022-12-22 PROCEDURE — 30000890 LABCORP MISCELLANEOUS TEST: Performed by: INTERNAL MEDICINE

## 2022-12-22 PROCEDURE — 84479 ASSAY OF THYROID (T3 OR T4): CPT

## 2022-12-22 PROCEDURE — 84436 ASSAY OF TOTAL THYROXINE: CPT | Performed by: INTERNAL MEDICINE

## 2022-12-22 PROCEDURE — 86140 C-REACTIVE PROTEIN: CPT | Performed by: INTERNAL MEDICINE

## 2022-12-22 PROCEDURE — 30000890 HC MISC. SEND OUT TEST

## 2022-12-22 PROCEDURE — 85025 COMPLETE CBC W/AUTO DIFF WBC: CPT | Performed by: INTERNAL MEDICINE

## 2022-12-22 PROCEDURE — 84443 ASSAY THYROID STIM HORMONE: CPT | Performed by: INTERNAL MEDICINE

## 2022-12-22 PROCEDURE — 36415 COLL VENOUS BLD VENIPUNCTURE: CPT | Performed by: INTERNAL MEDICINE

## 2022-12-24 LAB
LABCORP MISC TEST CODE: 455
LABCORP MISC TEST NAME: NORMAL
LABCORP MISCELLANEOUS TEST: NORMAL

## 2023-01-11 NOTE — NURSING
Received report from lab that K+ level is still critical at 2.9, Informed Dr Alonso, will keep pt on CCU to replace electrolytes.   4 = No assist / stand by assistance

## 2023-03-15 ENCOUNTER — HOSPITAL ENCOUNTER (OUTPATIENT)
Dept: RADIOLOGY | Facility: HOSPITAL | Age: 48
Discharge: HOME | End: 2023-03-15
Attending: EMERGENCY MEDICINE
Payer: MEDICAID

## 2023-03-15 DIAGNOSIS — M79.641 RIGHT HAND PAIN: Primary | ICD-10-CM

## 2023-03-15 DIAGNOSIS — M79.641 RIGHT HAND PAIN: ICD-10-CM

## 2023-03-15 DIAGNOSIS — R92.8 OTHER ABNORMAL AND INCONCLUSIVE FINDINGS ON DIAGNOSTIC IMAGING OF BREAST: Primary | ICD-10-CM

## 2023-03-15 PROCEDURE — 73130 X-RAY EXAM OF HAND: CPT | Mod: TC,RT

## 2023-03-21 ENCOUNTER — LAB VISIT (OUTPATIENT)
Dept: LAB | Facility: HOSPITAL | Age: 48
End: 2023-03-21
Attending: EMERGENCY MEDICINE
Payer: MEDICARE

## 2023-03-21 DIAGNOSIS — I10 ESSENTIAL HYPERTENSION, MALIGNANT: Primary | ICD-10-CM

## 2023-03-21 DIAGNOSIS — R20.2 PARESTHESIA: ICD-10-CM

## 2023-03-21 DIAGNOSIS — E55.9 AVITAMINOSIS D: ICD-10-CM

## 2023-03-21 LAB
25(OH)D3+25(OH)D2 SERPL-MCNC: 13 NG/ML (ref 30–96)
ALBUMIN SERPL BCP-MCNC: 4.3 G/DL (ref 3.5–5.2)
ALP SERPL-CCNC: 62 U/L (ref 55–135)
ALT SERPL W/O P-5'-P-CCNC: 29 U/L (ref 10–44)
ANION GAP SERPL CALC-SCNC: 13 MMOL/L (ref 8–16)
AST SERPL-CCNC: 30 U/L (ref 10–40)
BASOPHILS NFR BLD: 1 % (ref 0–1.9)
BILIRUB SERPL-MCNC: 0.3 MG/DL (ref 0.1–1)
BUN SERPL-MCNC: 23 MG/DL (ref 6–20)
CALCIUM SERPL-MCNC: 9.5 MG/DL (ref 8.7–10.5)
CHLORIDE SERPL-SCNC: 100 MMOL/L (ref 95–110)
CO2 SERPL-SCNC: 24 MMOL/L (ref 23–29)
CREAT SERPL-MCNC: 1.3 MG/DL (ref 0.5–1.4)
EOSINOPHIL NFR BLD: 7 % (ref 0–8)
ERYTHROCYTE [DISTWIDTH] IN BLOOD BY AUTOMATED COUNT: 13.5 % (ref 11.5–14.5)
ERYTHROCYTE [SEDIMENTATION RATE] IN BLOOD BY WESTERGREN METHOD: 5 MM/HR (ref 0–20)
EST. GFR  (NO RACE VARIABLE): 51 ML/MIN/1.73 M^2
GLUCOSE SERPL-MCNC: 95 MG/DL (ref 70–110)
HCT VFR BLD AUTO: 44.6 % (ref 37–48.5)
HGB BLD-MCNC: 15 G/DL (ref 12–16)
LYMPHOCYTES NFR BLD: 26 % (ref 18–48)
MCH RBC QN AUTO: 29.7 PG (ref 27–31)
MCHC RBC AUTO-ENTMCNC: 33.6 G/DL (ref 32–36)
MCV RBC AUTO: 88 FL (ref 82–98)
MONOCYTES NFR BLD: 2 % (ref 4–15)
NEUTROPHILS NFR BLD: 63 % (ref 38–73)
NEUTS BAND NFR BLD MANUAL: 1 %
NRBC BLD-RTO: 0 /100 WBC
PLATELET # BLD AUTO: 348 K/UL (ref 150–450)
PLATELET BLD QL SMEAR: ABNORMAL
PMV BLD AUTO: 9.2 FL (ref 9.2–12.9)
POTASSIUM SERPL-SCNC: 3.7 MMOL/L (ref 3.5–5.1)
PROT SERPL-MCNC: 7.6 G/DL (ref 6–8.4)
RBC # BLD AUTO: 5.05 M/UL (ref 4–5.4)
SODIUM SERPL-SCNC: 137 MMOL/L (ref 136–145)
VIT B12 SERPL-MCNC: >1500 PG/ML (ref 210–950)
WBC # BLD AUTO: 9.56 K/UL (ref 3.9–12.7)

## 2023-03-21 PROCEDURE — 85027 COMPLETE CBC AUTOMATED: CPT | Performed by: EMERGENCY MEDICINE

## 2023-03-21 PROCEDURE — 80053 COMPREHEN METABOLIC PANEL: CPT | Performed by: EMERGENCY MEDICINE

## 2023-03-21 PROCEDURE — 82607 VITAMIN B-12: CPT | Performed by: EMERGENCY MEDICINE

## 2023-03-21 PROCEDURE — 82306 VITAMIN D 25 HYDROXY: CPT | Performed by: EMERGENCY MEDICINE

## 2023-03-21 PROCEDURE — 36415 COLL VENOUS BLD VENIPUNCTURE: CPT | Performed by: EMERGENCY MEDICINE

## 2023-03-21 PROCEDURE — 85651 RBC SED RATE NONAUTOMATED: CPT | Performed by: EMERGENCY MEDICINE

## 2023-04-19 ENCOUNTER — HOSPITAL ENCOUNTER (OUTPATIENT)
Dept: RADIOLOGY | Facility: HOSPITAL | Age: 48
Discharge: HOME | End: 2023-04-19
Attending: EMERGENCY MEDICINE
Payer: MEDICARE

## 2023-04-19 DIAGNOSIS — R92.8 OTHER ABNORMAL AND INCONCLUSIVE FINDINGS ON DIAGNOSTIC IMAGING OF BREAST: ICD-10-CM

## 2023-04-19 PROCEDURE — A9585 GADOBUTROL INJECTION: HCPCS | Mod: PO | Performed by: EMERGENCY MEDICINE

## 2023-04-19 PROCEDURE — 25500020 PHARM REV CODE 255: Mod: PO | Performed by: EMERGENCY MEDICINE

## 2023-04-19 PROCEDURE — 77049 MRI BREAST C-+ W/CAD BI: CPT | Mod: TC,PO

## 2023-04-19 RX ORDER — GADOBUTROL 604.72 MG/ML
8.5 INJECTION INTRAVENOUS
Status: COMPLETED | OUTPATIENT
Start: 2023-04-19 | End: 2023-04-19

## 2023-04-19 RX ADMIN — GADOBUTROL 8.5 ML: 604.72 INJECTION INTRAVENOUS at 03:04

## 2023-05-04 ENCOUNTER — LAB VISIT (OUTPATIENT)
Dept: LAB | Facility: HOSPITAL | Age: 48
End: 2023-05-04
Attending: EMERGENCY MEDICINE
Payer: MEDICAID

## 2023-05-04 DIAGNOSIS — D72.10 EOSINOPHILIA: Primary | ICD-10-CM

## 2023-05-04 LAB
ALBUMIN SERPL BCP-MCNC: 4.2 G/DL (ref 3.5–5.2)
ALP SERPL-CCNC: 61 U/L (ref 55–135)
ALT SERPL W/O P-5'-P-CCNC: 20 U/L (ref 10–44)
ANION GAP SERPL CALC-SCNC: 7 MMOL/L (ref 8–16)
AST SERPL-CCNC: 18 U/L (ref 10–40)
BASOPHILS # BLD AUTO: 0.09 K/UL (ref 0–0.2)
BASOPHILS NFR BLD: 1.1 % (ref 0–1.9)
BILIRUB SERPL-MCNC: 0.4 MG/DL (ref 0.1–1)
BUN SERPL-MCNC: 15 MG/DL (ref 6–20)
CALCIUM SERPL-MCNC: 9.1 MG/DL (ref 8.7–10.5)
CHLORIDE SERPL-SCNC: 108 MMOL/L (ref 95–110)
CO2 SERPL-SCNC: 25 MMOL/L (ref 23–29)
CREAT SERPL-MCNC: 0.9 MG/DL (ref 0.5–1.4)
DIFFERENTIAL METHOD: ABNORMAL
EOSINOPHIL # BLD AUTO: 1.4 K/UL (ref 0–0.5)
EOSINOPHIL NFR BLD: 16.7 % (ref 0–8)
ERYTHROCYTE [DISTWIDTH] IN BLOOD BY AUTOMATED COUNT: 13 % (ref 11.5–14.5)
EST. GFR  (NO RACE VARIABLE): >60 ML/MIN/1.73 M^2
GLUCOSE SERPL-MCNC: 119 MG/DL (ref 70–110)
HCT VFR BLD AUTO: 44.9 % (ref 37–48.5)
HGB BLD-MCNC: 14.6 G/DL (ref 12–16)
IMM GRANULOCYTES # BLD AUTO: 0.01 K/UL (ref 0–0.04)
IMM GRANULOCYTES NFR BLD AUTO: 0.1 % (ref 0–0.5)
LYMPHOCYTES # BLD AUTO: 2.3 K/UL (ref 1–4.8)
LYMPHOCYTES NFR BLD: 28.1 % (ref 18–48)
MCH RBC QN AUTO: 28.7 PG (ref 27–31)
MCHC RBC AUTO-ENTMCNC: 32.5 G/DL (ref 32–36)
MCV RBC AUTO: 88 FL (ref 82–98)
MONOCYTES # BLD AUTO: 0.7 K/UL (ref 0.3–1)
MONOCYTES NFR BLD: 7.8 % (ref 4–15)
NEUTROPHILS # BLD AUTO: 3.8 K/UL (ref 1.8–7.7)
NEUTROPHILS NFR BLD: 46.2 % (ref 38–73)
NRBC BLD-RTO: 0 /100 WBC
PLATELET # BLD AUTO: 267 K/UL (ref 150–450)
PMV BLD AUTO: 9.3 FL (ref 9.2–12.9)
POTASSIUM SERPL-SCNC: 3.3 MMOL/L (ref 3.5–5.1)
PROT SERPL-MCNC: 7.2 G/DL (ref 6–8.4)
RBC # BLD AUTO: 5.08 M/UL (ref 4–5.4)
SODIUM SERPL-SCNC: 140 MMOL/L (ref 136–145)
WBC # BLD AUTO: 8.3 K/UL (ref 3.9–12.7)

## 2023-05-04 PROCEDURE — 80053 COMPREHEN METABOLIC PANEL: CPT | Performed by: EMERGENCY MEDICINE

## 2023-05-04 PROCEDURE — 85025 COMPLETE CBC W/AUTO DIFF WBC: CPT | Performed by: EMERGENCY MEDICINE

## 2023-05-04 PROCEDURE — 36415 COLL VENOUS BLD VENIPUNCTURE: CPT | Performed by: EMERGENCY MEDICINE

## 2023-05-16 ENCOUNTER — LAB VISIT (OUTPATIENT)
Dept: LAB | Facility: HOSPITAL | Age: 48
End: 2023-05-16
Attending: EMERGENCY MEDICINE
Payer: MEDICARE

## 2023-05-16 DIAGNOSIS — D72.10 EOSINOPHILIA: Primary | ICD-10-CM

## 2023-05-16 LAB
BASOPHILS # BLD AUTO: 0.03 K/UL (ref 0–0.2)
BASOPHILS NFR BLD: 0.3 % (ref 0–1.9)
DIFFERENTIAL METHOD: NORMAL
EOSINOPHIL # BLD AUTO: 0.3 K/UL (ref 0–0.5)
EOSINOPHIL NFR BLD: 3.4 % (ref 0–8)
ERYTHROCYTE [DISTWIDTH] IN BLOOD BY AUTOMATED COUNT: 13.1 % (ref 11.5–14.5)
HCT VFR BLD AUTO: 42.9 % (ref 37–48.5)
HGB BLD-MCNC: 14.4 G/DL (ref 12–16)
IMM GRANULOCYTES # BLD AUTO: 0.03 K/UL (ref 0–0.04)
IMM GRANULOCYTES NFR BLD AUTO: 0.3 % (ref 0–0.5)
LYMPHOCYTES # BLD AUTO: 2.7 K/UL (ref 1–4.8)
LYMPHOCYTES NFR BLD: 27.7 % (ref 18–48)
MCH RBC QN AUTO: 29 PG (ref 27–31)
MCHC RBC AUTO-ENTMCNC: 33.6 G/DL (ref 32–36)
MCV RBC AUTO: 86 FL (ref 82–98)
MONOCYTES # BLD AUTO: 0.7 K/UL (ref 0.3–1)
MONOCYTES NFR BLD: 7 % (ref 4–15)
NEUTROPHILS # BLD AUTO: 6.1 K/UL (ref 1.8–7.7)
NEUTROPHILS NFR BLD: 61.3 % (ref 38–73)
NRBC BLD-RTO: 0 /100 WBC
PLATELET # BLD AUTO: 272 K/UL (ref 150–450)
PMV BLD AUTO: 9.5 FL (ref 9.2–12.9)
RBC # BLD AUTO: 4.97 M/UL (ref 4–5.4)
WBC # BLD AUTO: 9.88 K/UL (ref 3.9–12.7)

## 2023-05-16 PROCEDURE — 85025 COMPLETE CBC W/AUTO DIFF WBC: CPT | Performed by: EMERGENCY MEDICINE

## 2023-05-16 PROCEDURE — 36415 COLL VENOUS BLD VENIPUNCTURE: CPT | Performed by: EMERGENCY MEDICINE

## 2023-12-01 ENCOUNTER — HOSPITAL ENCOUNTER (EMERGENCY)
Facility: HOSPITAL | Age: 48
Discharge: HOME | End: 2023-12-01
Attending: EMERGENCY MEDICINE
Payer: MEDICARE

## 2023-12-01 VITALS
DIASTOLIC BLOOD PRESSURE: 65 MMHG | SYSTOLIC BLOOD PRESSURE: 106 MMHG | HEART RATE: 76 BPM | OXYGEN SATURATION: 96 % | TEMPERATURE: 99 F | HEIGHT: 66 IN | RESPIRATION RATE: 18 BRPM | BODY MASS INDEX: 25.71 KG/M2 | WEIGHT: 160 LBS

## 2023-12-01 DIAGNOSIS — Z76.0 ENCOUNTER FOR MEDICATION REFILL: ICD-10-CM

## 2023-12-01 DIAGNOSIS — S60.229A CONTUSION OF HAND, UNSPECIFIED LATERALITY, INITIAL ENCOUNTER: Primary | ICD-10-CM

## 2023-12-01 LAB
ALBUMIN SERPL BCP-MCNC: 4.4 G/DL (ref 3.5–5.2)
ALP SERPL-CCNC: 67 U/L (ref 55–135)
ALT SERPL W/O P-5'-P-CCNC: 20 U/L (ref 10–44)
ANION GAP SERPL CALC-SCNC: 9 MMOL/L (ref 8–16)
AST SERPL-CCNC: 17 U/L (ref 10–40)
BASOPHILS # BLD AUTO: 0.07 K/UL (ref 0–0.2)
BASOPHILS NFR BLD: 0.9 % (ref 0–1.9)
BILIRUB SERPL-MCNC: 0.3 MG/DL (ref 0.1–1)
BILIRUB UR QL STRIP: NEGATIVE
BUN SERPL-MCNC: 15 MG/DL (ref 6–20)
CALCIUM SERPL-MCNC: 9 MG/DL (ref 8.7–10.5)
CHLORIDE SERPL-SCNC: 107 MMOL/L (ref 95–110)
CLARITY UR: CLEAR
CO2 SERPL-SCNC: 22 MMOL/L (ref 23–29)
COLOR UR: COLORLESS
CREAT SERPL-MCNC: 0.7 MG/DL (ref 0.5–1.4)
DIFFERENTIAL METHOD: ABNORMAL
EOSINOPHIL # BLD AUTO: 0.6 K/UL (ref 0–0.5)
EOSINOPHIL NFR BLD: 6.9 % (ref 0–8)
ERYTHROCYTE [DISTWIDTH] IN BLOOD BY AUTOMATED COUNT: 13.6 % (ref 11.5–14.5)
ERYTHROCYTE [SEDIMENTATION RATE] IN BLOOD BY WESTERGREN METHOD: 9 MM/HR (ref 0–20)
EST. GFR  (NO RACE VARIABLE): >60 ML/MIN/1.73 M^2
GLUCOSE SERPL-MCNC: 93 MG/DL (ref 70–110)
GLUCOSE UR QL STRIP: NEGATIVE
HCT VFR BLD AUTO: 41.7 % (ref 37–48.5)
HGB BLD-MCNC: 13.7 G/DL (ref 12–16)
HGB UR QL STRIP: NEGATIVE
IMM GRANULOCYTES # BLD AUTO: 0.04 K/UL (ref 0–0.04)
IMM GRANULOCYTES NFR BLD AUTO: 0.5 % (ref 0–0.5)
KETONES UR QL STRIP: NEGATIVE
LEUKOCYTE ESTERASE UR QL STRIP: NEGATIVE
LYMPHOCYTES # BLD AUTO: 2.6 K/UL (ref 1–4.8)
LYMPHOCYTES NFR BLD: 31.7 % (ref 18–48)
MCH RBC QN AUTO: 30.2 PG (ref 27–31)
MCHC RBC AUTO-ENTMCNC: 32.9 G/DL (ref 32–36)
MCV RBC AUTO: 92 FL (ref 82–98)
MONOCYTES # BLD AUTO: 0.7 K/UL (ref 0.3–1)
MONOCYTES NFR BLD: 8 % (ref 4–15)
NEUTROPHILS # BLD AUTO: 4.3 K/UL (ref 1.8–7.7)
NEUTROPHILS NFR BLD: 52 % (ref 38–73)
NITRITE UR QL STRIP: NEGATIVE
NRBC BLD-RTO: 0 /100 WBC
PH UR STRIP: 6 [PH] (ref 5–8)
PLATELET # BLD AUTO: 342 K/UL (ref 150–450)
PMV BLD AUTO: 9.2 FL (ref 9.2–12.9)
POTASSIUM SERPL-SCNC: 3.9 MMOL/L (ref 3.5–5.1)
PROT SERPL-MCNC: 7.3 G/DL (ref 6–8.4)
PROT UR QL STRIP: NEGATIVE
RBC # BLD AUTO: 4.53 M/UL (ref 4–5.4)
SODIUM SERPL-SCNC: 138 MMOL/L (ref 136–145)
SP GR UR STRIP: 1.01 (ref 1–1.03)
URN SPEC COLLECT METH UR: ABNORMAL
UROBILINOGEN UR STRIP-ACNC: NEGATIVE EU/DL
WBC # BLD AUTO: 8.16 K/UL (ref 3.9–12.7)

## 2023-12-01 PROCEDURE — 85025 COMPLETE CBC W/AUTO DIFF WBC: CPT | Performed by: EMERGENCY MEDICINE

## 2023-12-01 PROCEDURE — 99283 EMERGENCY DEPT VISIT LOW MDM: CPT

## 2023-12-01 PROCEDURE — 85651 RBC SED RATE NONAUTOMATED: CPT | Performed by: EMERGENCY MEDICINE

## 2023-12-01 PROCEDURE — 81003 URINALYSIS AUTO W/O SCOPE: CPT | Performed by: EMERGENCY MEDICINE

## 2023-12-01 PROCEDURE — 80053 COMPREHEN METABOLIC PANEL: CPT | Performed by: EMERGENCY MEDICINE

## 2023-12-01 NOTE — ED PROVIDER NOTES
Encounter Date: 2023       History     Chief Complaint   Patient presents with    Medication Refill     xanax    Hand Pain     L hand feels bruised x1.5 weeks      48-year-old female presents emergency department past medical history includes anxiety, bipolar.  Patient reports she is in the emergency department secondary to feeling like her hands are swollen and she can not close him secondary to swelling she also states that she is out of her Xanax and has been unable to see her primary care provider to get a refill because she was out of town and he is currently not in the office today.      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    Endometriosis     Headache     Heartburn     Hypotension     Infertility, female     Insomnia     Migraine headache     PTSD (post-traumatic stress disorder)     Trauma      Past Surgical History:   Procedure Laterality Date    APPENDECTOMY      BLADDER SUSPENSION      CERVICAL FUSION       SECTION      CHOLECYSTECTOMY      CYSTOSCOPY WITH BIOPSY OF BLADDER N/A 2018    Procedure: CYSTOSCOPY, WITH BLADDER BIOPSY, WITH FULGURATION IF INDICATED;  Surgeon: Luna Slater MD;  Location: [facility] OR;  Service: Urology;  Laterality: N/A;    CYSTOSCOPY WITH HYDRODISTENSION OF BLADDER N/A 12/3/2019    Procedure: CYSTOSCOPY, WITH BLADDER HYDRODISTENSION;  Surgeon: Gumaro Mcgovern MD;  Location: [facility] OR;  Service: OB/GYN;  Laterality: N/A;     DILATION OF URETHRA N/A 12/12/2018    Procedure: DILATION, URETHRA;  Surgeon: Luna Slater MD;  [facility]: [facility];  Service: Urology;  Laterality: N/A;    ESOPHAGOGASTRODUODENOSCOPY N/A 1/21/2022    Procedure: EGD (ESOPHAGOGASTRODUODENOSCOPY);  Surgeon: Satinder Rdz MD;  Location: [facility];  Service: Endoscopy;  Laterality: N/A;    HYSTERECTOMY      KNEE SURGERY Bilateral     TIBIA FRACTURE SURGERY Right     tibia/ fibula repaired with pins and screws    TOE SURGERY Left      Family History   Problem Relation Age of Onset    Hypertension Father     Hypertension Mother     Stroke Mother     Breast cancer Mother     Breast cancer Maternal Grandmother      Social History     Tobacco Use    Smoking status: Every Day     Current packs/day: 1.00     Types: Cigarettes, Vaping with nicotine    Smokeless tobacco: Never   Substance Use Topics    Alcohol use: Not Currently     Comment: none now    Drug use: Yes     Types: Marijuana     Comment: last dose today.     Review of Systems   Constitutional: Negative.    HENT: Negative.     Respiratory: Negative.     Cardiovascular: Negative.    Gastrointestinal: Negative.    Genitourinary: Negative.    Musculoskeletal:         Reports feels like her hands are swollen denies trauma   All other systems reviewed and are negative.      Physical Exam     Initial Vitals [12/01/23 1350]   BP Pulse Resp Temp SpO2   118/83 100 18 99.3 °F (37.4 °C) 96 %      MAP       --         Physical Exam    Nursing note and vitals reviewed.  Constitutional: She appears well-developed and well-nourished.   HENT:   Head: Normocephalic and atraumatic.   Eyes: Conjunctivae and EOM are normal. Pupils are equal, round, and reactive to light.   Cardiovascular:  Normal rate, regular rhythm, normal heart sounds and intact distal pulses.           Pulmonary/Chest: Breath sounds normal.   Abdominal: Abdomen is soft. Bowel sounds are normal.   Musculoskeletal:      Comments: Patient states that she  feels like her hands are swollen she is no obvious swelling to her hands she is able to make a complete fist distal pulses are intact no signs of trauma noted capillary refill is normal     Neurological: She is alert and oriented to person, place, and time. She has normal strength.   Skin: Skin is warm. No rash noted.   Psychiatric: She has a normal mood and affect.         ED Course   Procedures  Labs Reviewed   CBC W/ AUTO DIFFERENTIAL - Abnormal; Notable for the following components:       Result Value    Eos # 0.6 (*)     All other components within normal limits   COMPREHENSIVE METABOLIC PANEL - Abnormal; Notable for the following components:    CO2 22 (*)     All other components within normal limits   URINALYSIS, REFLEX TO URINE CULTURE - Abnormal; Notable for the following components:    Color, UA Colorless (*)     All other components within normal limits    Narrative:     Specimen Source->Urine   SEDIMENTATION RATE          Imaging Results    None          Medications - No data to display  Medical Decision Making  48-year-old female presents emergency department past medical history includes anxiety, bipolar.  Patient reports she is in the emergency department secondary to feeling like her hands are swollen and she can not close him secondary to swelling she also states that she is out of her Xanax and has been unable to see her primary care provider to get a refill because she was out of town and he is currently not in the office today.    Considerations include but not limited to, medication refill, hand contusion, electrolyte abnormalities    48-year-old female presents emergency department history of anxiety and bipolar states she is out of her Xanax and she was unable to see her primary care provider today because he was not in the office.  Patient was also complaining of swelling to her hands she is no visible swelling noted no signs of trauma noted distal pulses are intact she is no focal weaknesses  or deficits.  Labs drawn and unremarkable.  I did speak with the pharmacy tech regarding her prescription refill she called [facility] that is attached to the hospital and reported to me that the patient has 4 prescriptions waiting for her there that she is not picked up yet the patient was informed to take get her medicines picked up from the pharmacy.  Given return precautions    Amount and/or Complexity of Data Reviewed  Labs: ordered. Decision-making details documented in ED Course.                                      Clinical Impression:  Final diagnoses:  [S60.229A] Contusion of hand, unspecified laterality, initial encounter (Primary)  [Z76.0] Encounter for medication refill          ED Disposition Condition    Discharge Stable          ED Prescriptions    None       Follow-up Information       Follow up With Specialties Details Why Contact Info    Alberto Fairchild MD Internal Medicine Schedule an appointment as soon as possible for a visit in 1 week  [address] [place] [id number]  [phone]               Erika Borrego FNP  12/01/23 1719       Erika Borrego FNP  12/01/23 1712

## 2023-12-01 NOTE — DISCHARGE INSTRUCTIONS
Please go to [facility] in the front of the hospital your prescriptions are there you need to pick them up.  They were already filled by your PCP.   You must see your PCP for any further refills

## (undated) DEVICE — SEE L#133928

## (undated) DEVICE — GLOVE PROTEXIS PI SYN SURG 6.0

## (undated) DEVICE — SET CYSTO IRRIGATION UNIV SPIK

## (undated) DEVICE — SHEET DRAPE MEDIUM

## (undated) DEVICE — SPONGE GAUZE 16PLY 4X4

## (undated) DEVICE — GAUZE SPONGE BULKEE 6X6.75IN

## (undated) DEVICE — SYS LABEL CORRECT MED

## (undated) DEVICE — SOL WATER STRL IRR 1000ML

## (undated) DEVICE — LUBRICANT SURGILUBE 2 OZ

## (undated) DEVICE — SET IRR URLGY 2LINE UNIV SPIKE

## (undated) DEVICE — SYR 10CC LUER LOCK

## (undated) DEVICE — SEE MEDLINE ITEM 88971

## (undated) DEVICE — SEE L#120831

## (undated) DEVICE — SLEEVE SCD EXPRESS CALF MEDIUM

## (undated) DEVICE — SEE MEDLINE ITEM 157181

## (undated) DEVICE — SEE MEDLINE ITEM 152487

## (undated) DEVICE — SCRUB HIBICLENS 4% CHG 4OZ

## (undated) DEVICE — GLOVE PROTEXIS PI CLASSIC 7.5

## (undated) DEVICE — SEE MEDLINE ITEM 157116

## (undated) DEVICE — SOL IRR WATER STRL 3000 ML

## (undated) DEVICE — SEE MEDLINE ITEM 157185

## (undated) DEVICE — SOL NACL IRR 3000ML

## (undated) DEVICE — SCRUB 10% POVIDONE IODINE 4OZ